# Patient Record
Sex: FEMALE | Race: WHITE | NOT HISPANIC OR LATINO | Employment: OTHER | URBAN - METROPOLITAN AREA
[De-identification: names, ages, dates, MRNs, and addresses within clinical notes are randomized per-mention and may not be internally consistent; named-entity substitution may affect disease eponyms.]

---

## 2017-07-10 ENCOUNTER — HISTORICAL (OUTPATIENT)
Dept: ADMINISTRATIVE | Facility: HOSPITAL | Age: 77
End: 2017-07-10

## 2017-07-10 LAB
ALBUMIN SERPL-MCNC: 3.2 GM/DL (ref 3.4–5)
ALBUMIN/GLOB SERPL: 0.9 RATIO (ref 1.1–2)
ALP SERPL-CCNC: 98 UNIT/L (ref 38–126)
ALT SERPL-CCNC: 19 UNIT/L (ref 12–78)
AST SERPL-CCNC: 9 UNIT/L (ref 15–37)
BILIRUB SERPL-MCNC: 0.3 MG/DL (ref 0.2–1)
BILIRUBIN DIRECT+TOT PNL SERPL-MCNC: 0.1 MG/DL (ref 0–0.5)
BILIRUBIN DIRECT+TOT PNL SERPL-MCNC: 0.2 MG/DL (ref 0–0.8)
BUN SERPL-MCNC: 17 MG/DL (ref 7–18)
CALCIUM SERPL-MCNC: 9.2 MG/DL (ref 8.5–10.1)
CHLORIDE SERPL-SCNC: 104 MMOL/L (ref 98–107)
CHOLEST SERPL-MCNC: 180 MG/DL (ref 0–200)
CHOLEST/HDLC SERPL: 2.2 {RATIO} (ref 0–4)
CO2 SERPL-SCNC: 30 MMOL/L (ref 21–32)
CREAT SERPL-MCNC: 0.64 MG/DL (ref 0.55–1.02)
GLOBULIN SER-MCNC: 3.5 GM/DL (ref 2.4–3.5)
GLUCOSE SERPL-MCNC: 84 MG/DL (ref 74–106)
HDLC SERPL-MCNC: 81 MG/DL (ref 35–60)
LDLC SERPL CALC-MCNC: 74 MG/DL (ref 0–129)
POTASSIUM SERPL-SCNC: 4.8 MMOL/L (ref 3.5–5.1)
PROT SERPL-MCNC: 6.7 GM/DL (ref 6.4–8.2)
SODIUM SERPL-SCNC: 142 MMOL/L (ref 136–145)
TRIGL SERPL-MCNC: 126 MG/DL (ref 30–150)
VLDLC SERPL CALC-MCNC: 25 MG/DL

## 2017-07-20 ENCOUNTER — HISTORICAL (OUTPATIENT)
Dept: ADMINISTRATIVE | Facility: HOSPITAL | Age: 77
End: 2017-07-20

## 2017-09-13 ENCOUNTER — HISTORICAL (OUTPATIENT)
Dept: ADMINISTRATIVE | Facility: HOSPITAL | Age: 77
End: 2017-09-13

## 2018-05-24 ENCOUNTER — HISTORICAL (OUTPATIENT)
Dept: ADMINISTRATIVE | Facility: HOSPITAL | Age: 78
End: 2018-05-24

## 2018-05-24 LAB
ABS NEUT (OLG): 4.28 X10(3)/MCL (ref 2.1–9.2)
ALBUMIN SERPL-MCNC: 3.2 GM/DL (ref 3.4–5)
ALBUMIN/GLOB SERPL: 0.9 {RATIO}
ALP SERPL-CCNC: 92 UNIT/L (ref 38–126)
ALT SERPL-CCNC: 13 UNIT/L (ref 12–78)
AST SERPL-CCNC: 9 UNIT/L (ref 15–37)
BASOPHILS # BLD AUTO: 0 X10(3)/MCL (ref 0–0.2)
BASOPHILS NFR BLD AUTO: 1 %
BILIRUB SERPL-MCNC: 0.4 MG/DL (ref 0.2–1)
BILIRUBIN DIRECT+TOT PNL SERPL-MCNC: 0.1 MG/DL (ref 0–0.2)
BILIRUBIN DIRECT+TOT PNL SERPL-MCNC: 0.3 MG/DL (ref 0–0.8)
BUN SERPL-MCNC: 16 MG/DL (ref 7–18)
CALCIUM SERPL-MCNC: 9.2 MG/DL (ref 8.5–10.1)
CHLORIDE SERPL-SCNC: 106 MMOL/L (ref 98–107)
CHOLEST SERPL-MCNC: 185 MG/DL (ref 0–200)
CHOLEST/HDLC SERPL: 2.2 {RATIO} (ref 0–4)
CO2 SERPL-SCNC: 29 MMOL/L (ref 21–32)
CREAT SERPL-MCNC: 0.57 MG/DL (ref 0.55–1.02)
EOSINOPHIL # BLD AUTO: 0.3 X10(3)/MCL (ref 0–0.9)
EOSINOPHIL NFR BLD AUTO: 4 %
ERYTHROCYTE [DISTWIDTH] IN BLOOD BY AUTOMATED COUNT: 13.3 % (ref 11.5–17)
GLOBULIN SER-MCNC: 3.4 GM/DL (ref 2.4–3.5)
GLUCOSE SERPL-MCNC: 82 MG/DL (ref 74–106)
HCT VFR BLD AUTO: 41.7 % (ref 37–47)
HDLC SERPL-MCNC: 85 MG/DL (ref 35–60)
HGB BLD-MCNC: 13.5 GM/DL (ref 12–16)
LDLC SERPL CALC-MCNC: 78 MG/DL (ref 0–129)
LYMPHOCYTES # BLD AUTO: 2 X10(3)/MCL (ref 0.6–4.6)
LYMPHOCYTES NFR BLD AUTO: 27 %
MCH RBC QN AUTO: 31.8 PG (ref 27–31)
MCHC RBC AUTO-ENTMCNC: 32.4 GM/DL (ref 33–36)
MCV RBC AUTO: 98.3 FL (ref 80–94)
MONOCYTES # BLD AUTO: 0.9 X10(3)/MCL (ref 0.1–1.3)
MONOCYTES NFR BLD AUTO: 12 %
NEUTROPHILS # BLD AUTO: 4.28 X10(3)/MCL (ref 1.4–7.9)
NEUTROPHILS NFR BLD AUTO: 57 %
PLATELET # BLD AUTO: 201 X10(3)/MCL (ref 130–400)
PMV BLD AUTO: 9.9 FL (ref 9.4–12.4)
POTASSIUM SERPL-SCNC: 4.7 MMOL/L (ref 3.5–5.1)
PROT SERPL-MCNC: 6.6 GM/DL (ref 6.4–8.2)
RBC # BLD AUTO: 4.24 X10(6)/MCL (ref 4.2–5.4)
SODIUM SERPL-SCNC: 142 MMOL/L (ref 136–145)
TRIGL SERPL-MCNC: 111 MG/DL (ref 30–150)
TSH SERPL-ACNC: 1.41 MIU/L (ref 0.36–3.74)
VLDLC SERPL CALC-MCNC: 22 MG/DL
WBC # SPEC AUTO: 7.5 X10(3)/MCL (ref 4.5–11.5)

## 2018-12-13 ENCOUNTER — HISTORICAL (OUTPATIENT)
Dept: ADMINISTRATIVE | Facility: HOSPITAL | Age: 78
End: 2018-12-13

## 2018-12-13 LAB
ABS NEUT (OLG): 4.04 X10(3)/MCL (ref 2.1–9.2)
ALBUMIN SERPL-MCNC: 3.2 GM/DL (ref 3.4–5)
ALBUMIN/GLOB SERPL: 1 {RATIO}
ALP SERPL-CCNC: 88 UNIT/L (ref 38–126)
ALT SERPL-CCNC: 12 UNIT/L (ref 12–78)
AST SERPL-CCNC: 6 UNIT/L (ref 15–37)
BASOPHILS # BLD AUTO: 0 X10(3)/MCL (ref 0–0.2)
BASOPHILS NFR BLD AUTO: 0 %
BILIRUB SERPL-MCNC: 0.4 MG/DL (ref 0.2–1)
BILIRUBIN DIRECT+TOT PNL SERPL-MCNC: 0.1 MG/DL (ref 0–0.2)
BILIRUBIN DIRECT+TOT PNL SERPL-MCNC: 0.3 MG/DL (ref 0–0.8)
BUN SERPL-MCNC: 15 MG/DL (ref 7–18)
CALCIUM SERPL-MCNC: 9 MG/DL (ref 8.5–10.1)
CHLORIDE SERPL-SCNC: 106 MMOL/L (ref 98–107)
CHOLEST SERPL-MCNC: 181 MG/DL (ref 0–200)
CHOLEST/HDLC SERPL: 2.5 {RATIO} (ref 0–4)
CO2 SERPL-SCNC: 30 MMOL/L (ref 21–32)
CREAT SERPL-MCNC: 0.63 MG/DL (ref 0.55–1.02)
EOSINOPHIL # BLD AUTO: 0.3 X10(3)/MCL (ref 0–0.9)
EOSINOPHIL NFR BLD AUTO: 4 %
ERYTHROCYTE [DISTWIDTH] IN BLOOD BY AUTOMATED COUNT: 13.2 % (ref 11.5–17)
GLOBULIN SER-MCNC: 3.1 GM/DL (ref 2.4–3.5)
GLUCOSE SERPL-MCNC: 93 MG/DL (ref 74–106)
HCT VFR BLD AUTO: 43.1 % (ref 37–47)
HDLC SERPL-MCNC: 71 MG/DL (ref 35–60)
HGB BLD-MCNC: 13.7 GM/DL (ref 12–16)
LDLC SERPL CALC-MCNC: 81 MG/DL (ref 0–129)
LYMPHOCYTES # BLD AUTO: 1.9 X10(3)/MCL (ref 0.6–4.6)
LYMPHOCYTES NFR BLD AUTO: 27 %
MCH RBC QN AUTO: 30.9 PG (ref 27–31)
MCHC RBC AUTO-ENTMCNC: 31.8 GM/DL (ref 33–36)
MCV RBC AUTO: 97.3 FL (ref 80–94)
MONOCYTES # BLD AUTO: 0.8 X10(3)/MCL (ref 0.1–1.3)
MONOCYTES NFR BLD AUTO: 11 %
NEUTROPHILS # BLD AUTO: 4.04 X10(3)/MCL (ref 2.1–9.2)
NEUTROPHILS NFR BLD AUTO: 57 %
PLATELET # BLD AUTO: 187 X10(3)/MCL (ref 130–400)
PMV BLD AUTO: 10 FL (ref 9.4–12.4)
POTASSIUM SERPL-SCNC: 4.7 MMOL/L (ref 3.5–5.1)
PROT SERPL-MCNC: 6.3 GM/DL (ref 6.4–8.2)
RBC # BLD AUTO: 4.43 X10(6)/MCL (ref 4.2–5.4)
SODIUM SERPL-SCNC: 142 MMOL/L (ref 136–145)
TRIGL SERPL-MCNC: 143 MG/DL (ref 30–150)
VLDLC SERPL CALC-MCNC: 29 MG/DL
WBC # SPEC AUTO: 7.1 X10(3)/MCL (ref 4.5–11.5)

## 2019-03-21 ENCOUNTER — HISTORICAL (OUTPATIENT)
Dept: ADMINISTRATIVE | Facility: HOSPITAL | Age: 79
End: 2019-03-21

## 2020-01-15 ENCOUNTER — HISTORICAL (OUTPATIENT)
Dept: ADMINISTRATIVE | Facility: HOSPITAL | Age: 80
End: 2020-01-15

## 2020-01-15 LAB
ABS NEUT (OLG): 4.31 X10(3)/MCL (ref 2.1–9.2)
ALBUMIN SERPL-MCNC: 3.1 GM/DL (ref 3.4–5)
ALBUMIN/GLOB SERPL: 0.9 RATIO (ref 1.1–2)
ALP SERPL-CCNC: 98 UNIT/L (ref 38–126)
ALT SERPL-CCNC: 12 UNIT/L (ref 12–78)
AST SERPL-CCNC: 9 UNIT/L (ref 15–37)
BASOPHILS # BLD AUTO: 0 X10(3)/MCL (ref 0–0.2)
BASOPHILS NFR BLD AUTO: 1 %
BILIRUB SERPL-MCNC: 0.3 MG/DL (ref 0.2–1)
BILIRUBIN DIRECT+TOT PNL SERPL-MCNC: 0.1 MG/DL (ref 0–0.5)
BILIRUBIN DIRECT+TOT PNL SERPL-MCNC: 0.2 MG/DL (ref 0–0.8)
BUN SERPL-MCNC: 17 MG/DL (ref 7–18)
CALCIUM SERPL-MCNC: 8.9 MG/DL (ref 8.5–10.1)
CHLORIDE SERPL-SCNC: 108 MMOL/L (ref 98–107)
CHOLEST SERPL-MCNC: 189 MG/DL (ref 0–200)
CHOLEST/HDLC SERPL: 2.7 {RATIO} (ref 0–4)
CO2 SERPL-SCNC: 29 MMOL/L (ref 21–32)
CREAT SERPL-MCNC: 0.68 MG/DL (ref 0.55–1.02)
EOSINOPHIL # BLD AUTO: 0.3 X10(3)/MCL (ref 0–0.9)
EOSINOPHIL NFR BLD AUTO: 4 %
ERYTHROCYTE [DISTWIDTH] IN BLOOD BY AUTOMATED COUNT: 14 % (ref 11.5–17)
GLOBULIN SER-MCNC: 3.3 GM/DL (ref 2.4–3.5)
GLUCOSE SERPL-MCNC: 85 MG/DL (ref 74–106)
HCT VFR BLD AUTO: 43.3 % (ref 37–47)
HDLC SERPL-MCNC: 69 MG/DL (ref 35–60)
HGB BLD-MCNC: 13.4 GM/DL (ref 12–16)
IMM GRANULOCYTES # BLD AUTO: 0 10*3/UL
IMM GRANULOCYTES NFR BLD AUTO: 0 %
LDLC SERPL CALC-MCNC: 95 MG/DL (ref 0–129)
LYMPHOCYTES # BLD AUTO: 2 X10(3)/MCL (ref 0.6–4.6)
LYMPHOCYTES NFR BLD AUTO: 26 %
MCH RBC QN AUTO: 31.2 PG (ref 27–31)
MCHC RBC AUTO-ENTMCNC: 30.9 GM/DL (ref 33–36)
MCV RBC AUTO: 100.7 FL (ref 80–94)
MONOCYTES # BLD AUTO: 0.9 X10(3)/MCL (ref 0.1–1.3)
MONOCYTES NFR BLD AUTO: 12 %
NEUTROPHILS # BLD AUTO: 4.31 X10(3)/MCL (ref 2.1–9.2)
NEUTROPHILS NFR BLD AUTO: 57 %
PLATELET # BLD AUTO: 199 X10(3)/MCL (ref 130–400)
PMV BLD AUTO: 10.3 FL (ref 9.4–12.4)
POTASSIUM SERPL-SCNC: 4.3 MMOL/L (ref 3.5–5.1)
PROT SERPL-MCNC: 6.4 GM/DL (ref 6.4–8.2)
RBC # BLD AUTO: 4.3 X10(6)/MCL (ref 4.2–5.4)
SODIUM SERPL-SCNC: 142 MMOL/L (ref 136–145)
TRIGL SERPL-MCNC: 126 MG/DL (ref 30–150)
TSH SERPL-ACNC: 1.95 MIU/L (ref 0.36–3.74)
VLDLC SERPL CALC-MCNC: 25 MG/DL
WBC # SPEC AUTO: 7.5 X10(3)/MCL (ref 4.5–11.5)

## 2020-04-23 ENCOUNTER — HISTORICAL (OUTPATIENT)
Dept: ADMINISTRATIVE | Facility: HOSPITAL | Age: 80
End: 2020-04-23

## 2020-04-23 LAB
ALBUMIN SERPL-MCNC: 3.4 GM/DL (ref 3.4–4.8)
ALBUMIN/GLOB SERPL: 1.1 RATIO (ref 1.1–2)
ALP SERPL-CCNC: 96 UNIT/L (ref 40–150)
ALT SERPL-CCNC: 7 UNIT/L (ref 0–55)
AST SERPL-CCNC: 10 UNIT/L (ref 5–34)
BILIRUB SERPL-MCNC: 0.4 MG/DL
BILIRUBIN DIRECT+TOT PNL SERPL-MCNC: 0.2 MG/DL (ref 0–0.5)
BILIRUBIN DIRECT+TOT PNL SERPL-MCNC: 0.2 MG/DL (ref 0–0.8)
BUN SERPL-MCNC: 15 MG/DL (ref 9.8–20.1)
CALCIUM SERPL-MCNC: 9 MG/DL (ref 8.4–10.2)
CHLORIDE SERPL-SCNC: 106 MMOL/L (ref 98–107)
CHOLEST SERPL-MCNC: 180 MG/DL
CHOLEST/HDLC SERPL: 3 {RATIO} (ref 0–5)
CO2 SERPL-SCNC: 30 MMOL/L (ref 23–31)
CREAT SERPL-MCNC: 0.71 MG/DL (ref 0.55–1.02)
GLOBULIN SER-MCNC: 3 GM/DL (ref 2.4–3.5)
GLUCOSE SERPL-MCNC: 94 MG/DL (ref 82–115)
HDLC SERPL-MCNC: 58 MG/DL (ref 35–60)
LDLC SERPL CALC-MCNC: 95 MG/DL (ref 50–140)
POTASSIUM SERPL-SCNC: 4.5 MMOL/L (ref 3.5–5.1)
PROT SERPL-MCNC: 6.4 GM/DL (ref 5.8–7.6)
SODIUM SERPL-SCNC: 142 MMOL/L (ref 136–145)
TRIGL SERPL-MCNC: 136 MG/DL (ref 37–140)
VLDLC SERPL CALC-MCNC: 27 MG/DL

## 2021-01-19 ENCOUNTER — HISTORICAL (OUTPATIENT)
Dept: ADMINISTRATIVE | Facility: HOSPITAL | Age: 81
End: 2021-01-19

## 2021-01-19 LAB
ABS NEUT (OLG): 5.56 X10(3)/MCL (ref 2.1–9.2)
ALBUMIN SERPL-MCNC: 3.3 GM/DL (ref 3.4–4.8)
ALBUMIN/GLOB SERPL: 1 RATIO (ref 1.1–2)
ALP SERPL-CCNC: 97 UNIT/L (ref 40–150)
ALT SERPL-CCNC: 8 UNIT/L (ref 0–55)
AST SERPL-CCNC: 10 UNIT/L (ref 5–34)
BASOPHILS # BLD AUTO: 0 X10(3)/MCL (ref 0–0.2)
BASOPHILS NFR BLD AUTO: 1 %
BILIRUB SERPL-MCNC: 0.4 MG/DL
BILIRUBIN DIRECT+TOT PNL SERPL-MCNC: 0.2 MG/DL (ref 0–0.5)
BILIRUBIN DIRECT+TOT PNL SERPL-MCNC: 0.2 MG/DL (ref 0–0.8)
BUN SERPL-MCNC: 15.6 MG/DL (ref 9.8–20.1)
CALCIUM SERPL-MCNC: 9.2 MG/DL (ref 8.4–10.2)
CHLORIDE SERPL-SCNC: 103 MMOL/L (ref 98–107)
CHOLEST SERPL-MCNC: 198 MG/DL
CHOLEST/HDLC SERPL: 3 {RATIO} (ref 0–5)
CO2 SERPL-SCNC: 29 MMOL/L (ref 23–31)
CREAT SERPL-MCNC: 0.72 MG/DL (ref 0.55–1.02)
EOSINOPHIL # BLD AUTO: 0.2 X10(3)/MCL (ref 0–0.9)
EOSINOPHIL NFR BLD AUTO: 2 %
ERYTHROCYTE [DISTWIDTH] IN BLOOD BY AUTOMATED COUNT: 13.2 % (ref 11.5–17)
GLOBULIN SER-MCNC: 3.3 GM/DL (ref 2.4–3.5)
GLUCOSE SERPL-MCNC: 79 MG/DL (ref 82–115)
HCT VFR BLD AUTO: 42.9 % (ref 37–47)
HDLC SERPL-MCNC: 76 MG/DL (ref 35–60)
HGB BLD-MCNC: 13.8 GM/DL (ref 12–16)
LDLC SERPL CALC-MCNC: 96 MG/DL (ref 50–140)
LYMPHOCYTES # BLD AUTO: 2.2 X10(3)/MCL (ref 0.6–4.6)
LYMPHOCYTES NFR BLD AUTO: 25 %
MCH RBC QN AUTO: 31.7 PG (ref 27–31)
MCHC RBC AUTO-ENTMCNC: 32.2 GM/DL (ref 33–36)
MCV RBC AUTO: 98.4 FL (ref 80–94)
MONOCYTES # BLD AUTO: 0.9 X10(3)/MCL (ref 0.1–1.3)
MONOCYTES NFR BLD AUTO: 10 %
NEUTROPHILS # BLD AUTO: 5.56 X10(3)/MCL (ref 2.1–9.2)
NEUTROPHILS NFR BLD AUTO: 62 %
PLATELET # BLD AUTO: 213 X10(3)/MCL (ref 130–400)
PMV BLD AUTO: 10.2 FL (ref 9.4–12.4)
POTASSIUM SERPL-SCNC: 4.9 MMOL/L (ref 3.5–5.1)
PROT SERPL-MCNC: 6.6 GM/DL (ref 5.8–7.6)
RBC # BLD AUTO: 4.36 X10(6)/MCL (ref 4.2–5.4)
SODIUM SERPL-SCNC: 139 MMOL/L (ref 136–145)
TRIGL SERPL-MCNC: 130 MG/DL (ref 37–140)
TSH SERPL-ACNC: 2.14 UIU/ML (ref 0.35–4.94)
VLDLC SERPL CALC-MCNC: 26 MG/DL
WBC # SPEC AUTO: 8.9 X10(3)/MCL (ref 4.5–11.5)

## 2021-05-11 ENCOUNTER — HISTORICAL (OUTPATIENT)
Dept: ADMINISTRATIVE | Facility: HOSPITAL | Age: 81
End: 2021-05-11

## 2021-05-11 LAB
ALBUMIN SERPL-MCNC: 3.4 GM/DL (ref 3.4–4.8)
ALBUMIN/GLOB SERPL: 1 RATIO (ref 1.1–2)
ALP SERPL-CCNC: 110 UNIT/L (ref 40–150)
ALT SERPL-CCNC: 10 UNIT/L (ref 0–55)
AST SERPL-CCNC: 12 UNIT/L (ref 5–34)
BILIRUB SERPL-MCNC: 0.3 MG/DL
BILIRUBIN DIRECT+TOT PNL SERPL-MCNC: 0.1 MG/DL (ref 0–0.5)
BILIRUBIN DIRECT+TOT PNL SERPL-MCNC: 0.2 MG/DL (ref 0–0.8)
BUN SERPL-MCNC: 15.6 MG/DL (ref 9.8–20.1)
CALCIUM SERPL-MCNC: 9.5 MG/DL (ref 8.4–10.2)
CHLORIDE SERPL-SCNC: 104 MMOL/L (ref 98–107)
CHOLEST SERPL-MCNC: 205 MG/DL
CHOLEST/HDLC SERPL: 3 {RATIO} (ref 0–5)
CO2 SERPL-SCNC: 31 MMOL/L (ref 23–31)
CREAT SERPL-MCNC: 0.68 MG/DL (ref 0.55–1.02)
GLOBULIN SER-MCNC: 3.4 GM/DL (ref 2.4–3.5)
GLUCOSE SERPL-MCNC: 77 MG/DL (ref 82–115)
HDLC SERPL-MCNC: 70 MG/DL (ref 35–60)
LDLC SERPL CALC-MCNC: 107 MG/DL (ref 50–140)
POTASSIUM SERPL-SCNC: 4.8 MMOL/L (ref 3.5–5.1)
PROT SERPL-MCNC: 6.8 GM/DL (ref 5.8–7.6)
SODIUM SERPL-SCNC: 143 MMOL/L (ref 136–145)
TRIGL SERPL-MCNC: 141 MG/DL (ref 37–140)
VLDLC SERPL CALC-MCNC: 28 MG/DL

## 2021-10-12 ENCOUNTER — HISTORICAL (OUTPATIENT)
Dept: ADMINISTRATIVE | Facility: HOSPITAL | Age: 81
End: 2021-10-12

## 2021-10-14 ENCOUNTER — HISTORICAL (OUTPATIENT)
Dept: ADMINISTRATIVE | Facility: HOSPITAL | Age: 81
End: 2021-10-14

## 2021-11-09 ENCOUNTER — HISTORICAL (OUTPATIENT)
Dept: ADMINISTRATIVE | Facility: HOSPITAL | Age: 81
End: 2021-11-09

## 2021-12-07 ENCOUNTER — HISTORICAL (OUTPATIENT)
Dept: ADMINISTRATIVE | Facility: HOSPITAL | Age: 81
End: 2021-12-07

## 2022-01-25 ENCOUNTER — HISTORICAL (OUTPATIENT)
Dept: ADMINISTRATIVE | Facility: HOSPITAL | Age: 82
End: 2022-01-25

## 2022-04-12 ENCOUNTER — HISTORICAL (OUTPATIENT)
Dept: ADMINISTRATIVE | Facility: HOSPITAL | Age: 82
End: 2022-04-12
Payer: MEDICARE

## 2022-04-30 VITALS
SYSTOLIC BLOOD PRESSURE: 138 MMHG | OXYGEN SATURATION: 97 % | BODY MASS INDEX: 28.54 KG/M2 | DIASTOLIC BLOOD PRESSURE: 68 MMHG | HEIGHT: 65 IN | WEIGHT: 171.31 LBS

## 2022-05-05 ENCOUNTER — LAB VISIT (OUTPATIENT)
Dept: LAB | Facility: HOSPITAL | Age: 82
End: 2022-05-05
Attending: INTERNAL MEDICINE
Payer: MEDICARE

## 2022-05-05 DIAGNOSIS — I48.92 ATRIAL FLUTTER: Primary | ICD-10-CM

## 2022-05-05 DIAGNOSIS — Z51.81 ENCOUNTER FOR THERAPEUTIC DRUG MONITORING: ICD-10-CM

## 2022-05-05 DIAGNOSIS — E78.5 HYPERLIPIDEMIA, UNSPECIFIED HYPERLIPIDEMIA TYPE: ICD-10-CM

## 2022-05-05 LAB
ALBUMIN SERPL-MCNC: 3.5 GM/DL (ref 3.4–4.8)
ALBUMIN/GLOB SERPL: 1.2 RATIO (ref 1.1–2)
ALP SERPL-CCNC: 123 UNIT/L (ref 40–150)
ALT SERPL-CCNC: 13 UNIT/L (ref 0–55)
AST SERPL-CCNC: 13 UNIT/L (ref 5–34)
BILIRUBIN DIRECT+TOT PNL SERPL-MCNC: 0.2 MG/DL (ref 0–0.5)
BILIRUBIN DIRECT+TOT PNL SERPL-MCNC: 0.3 MG/DL (ref 0–0.8)
BILIRUBIN DIRECT+TOT PNL SERPL-MCNC: 0.5 MG/DL
BUN SERPL-MCNC: 20.2 MG/DL (ref 9.8–20.1)
CALCIUM SERPL-MCNC: 9.5 MG/DL (ref 8.4–10.2)
CHLORIDE SERPL-SCNC: 107 MMOL/L (ref 98–107)
CHOLEST SERPL-MCNC: 135 MG/DL
CHOLEST/HDLC SERPL: 3 {RATIO} (ref 0–5)
CO2 SERPL-SCNC: 29 MMOL/L (ref 23–31)
CREAT SERPL-MCNC: 0.73 MG/DL (ref 0.55–1.02)
GLOBULIN SER-MCNC: 2.9 GM/DL (ref 2.4–3.5)
GLUCOSE SERPL-MCNC: 87 MG/DL (ref 82–115)
HDLC SERPL-MCNC: 51 MG/DL (ref 35–60)
LDLC SERPL CALC-MCNC: 66 MG/DL (ref 50–140)
POTASSIUM SERPL-SCNC: 4.7 MMOL/L (ref 3.5–5.1)
PROT SERPL-MCNC: 6.4 GM/DL (ref 5.8–7.6)
SODIUM SERPL-SCNC: 142 MMOL/L (ref 136–145)
TRIGL SERPL-MCNC: 92 MG/DL (ref 37–140)
VLDLC SERPL CALC-MCNC: 18 MG/DL

## 2022-05-05 PROCEDURE — 80061 LIPID PANEL: CPT

## 2022-05-05 PROCEDURE — 30000890 FLECAINIDE LEVEL

## 2022-05-05 PROCEDURE — 80181 DRUG ASSAY FLECAINIDE: CPT

## 2022-05-05 PROCEDURE — 80053 COMPREHEN METABOLIC PANEL: CPT

## 2022-05-05 PROCEDURE — 36415 COLL VENOUS BLD VENIPUNCTURE: CPT

## 2022-05-05 NOTE — HISTORICAL [facility]
This is a historical note converted from [employer]. Formatting and pictures may have been removed.  Please reference [employer] for original formatting and attached multimedia. Chief Complaint  Right hand redness, swelling, pain x 3 days. denies any injury or trauma.  History of Present Illness  81-year-old female who presents for evaluation of right hand pain and swelling that is been present for approximately 3 days. ?The patient states that she frequently hits her hand on objects?when moving around but denies any known specific trauma. ?She denies any falls.  Review of Systems  Constitutional_no fever, fatigue, weakness  Musculoskeletal_negative except HPI  Integumentary_no skin rash or abnormal lesion  Neurologic_no headache, no dizziness, no weakness or numbness  ?  Physical Exam  Vitals & Measurements  T:?36.7? ?C (Oral)? HR:?60(Peripheral)? RR:?20? BP:?130/72? SpO2:?97%?  HT:?165.00?cm? WT:?77.700?kg? BMI:?28.54?  General_well-developed well-nourished in no acute distress  Respiratory_symmetric chest rise, non-labored respirations  Musculoskeletal_tenderness at?the proximal?phalanx of the second and third fingers, tenderness and mild swelling at the?second and third metacarpals, mild tenderness at the?proximal phalanx of the thumb  Integumentary_no rashes or skin lesions present  Neurologic_ no signs of peripheral neurological deficit, motor/sensory function intact?  ?  Assessment/Plan  1.?Pain in right hand?M79.641  X-ray results from radiology reading will be monitored, results will be reported when available.  Start methocarbamol as prescribed  May alternate with Tylenol?and recommend ice and elevation  Monitor for worsening symptoms and contact medical any concerns arise.  No acute fracture at the metacarpal?or proximal phalanges?where patient is describing pain however?this is my read and radiologist read is pending.? As above patient will be notified of official results.  Ordered:  Office/Outpatient Visit  Level 3 Established [id number] PC, Pain in right hand  Swelling of right hand, 10/12/21 9:10:00 CDT  XR Hand Right Minimum 3 Views, Routine, 10/12/21 8:49:00 CDT, None, Patient Bed, Patient Has IV?, Patient on Oxygen?, Rad Type, Pain in right hand  Swelling of right hand, Not Scheduled, 10/12/21 8:49:00 CDT  ?  2.?Swelling of right hand?M79.89  ?See #1  Ordered:  Office/Outpatient Visit Level 3 Established [id number] PC, Pain in right hand  Swelling of right hand, 10/12/21 9:10:00 CDT  XR Hand Right Minimum 3 Views, Routine, 10/12/21 8:49:00 CDT, None, Patient Bed, Patient Has IV?, Patient on Oxygen?, Rad Type, Pain in right hand  Swelling of right hand, Not Scheduled, 10/12/21 8:49:00 CDT  ?  Orders:  methocarbamol, 500 mg = 1 tab(s), Oral, TID, PRN PRN as needed for pain, X 5 day(s), # 15 tab(s), 0 Refill(s), [email or web address], 165, cm, Height/Length Dosing, 10/12/21 8:24:00 CDT, 77.7, kg, Weight Dosing, 10/12/21 8:24:00 CDT   Problem List/Past Medical History  Ongoing  Afib(  Confirmed  )  HBP (High Blood Pressure)(  Confirmed  )  Historical  No qualifying data  Procedure/Surgical History  Repair Right Hand Skin, External Approach (11/09/2019)  Simple repair of superficial wounds of scalp, neck, axillae, external genitalia, trunk and/or extremities (including hands and feet); 2.5 cm or less (11/09/2019)  Fluoroscopy of Left Heart using Low Osmolar Contrast (03/10/2018)  Fluoroscopy of Multiple Coronary Arteries using Low Osmolar Contrast (03/10/2018)  Measurement of Cardiac Sampling and Pressure, Left Heart, Percutaneous Approach (03/10/2018)  Ablation operation for arrhythmia  Aneurysmectomy  Hysterectomy   Medications  aspirin 81 mg oral tablet, CHEWABLE, 81 mg= 1 tab(s), Oral, Daily  atorvastatin 10 mg oral tablet, 10 mg= 1 tab(s), Oral, Daily  Bentyl 10 mg oral capsule, 10 mg= 1 cap(s), Oral, QID, PRN  Colon Health  conjugated estrogens 0.45 mg oral tablet  flecainide 50 mg oral tablet, 50 mg= 1  tab(s), Oral, q12hr  losartan 50 mg oral tablet, 50 mg= 1 tab(s), Oral, BID  methocarbamol 500 mg oral tablet, 500 mg= 1 tab(s), Oral, TID, PRN  metoprolol succinate 100 mg oral tablet extended release, Oral, Daily  Probiotic Formula oral capsule  Vitamin D3 1000 intl units oral tablet, Oral, Daily  Xarelto 20mg Tablet, 20 mg= 1 tab(s), Oral, Daily  Zofran ODT 4 mg oral tablet, disintegrating, 4 mg= 1 tab(s), Oral, q6hr, PRN  Allergies  No Known Allergies  No Known Medication Allergies  Social History  Abuse/Neglect  No, 10/12/2021  No, 01/26/2021  Alcohol  Never, 08/19/2015  Employment/School  Retired, 08/19/2015  Exercise  Exercise duration: 0., 03/17/2016  Home/Environment  Lives with Spouse., 08/19/2015  Nutrition/Health  Regular, 08/19/2015  Sexual  Sexually active: Yes., 05/25/2017  Substance Use  Never, 08/19/2015  Tobacco  Never (less than 100 in lifetime), N/A, 10/12/2021  Never (less than 100 in lifetime), N/A, 01/26/2021  Family History  Heart disease: Father.  Immunizations  Vaccine Date Status   COVID-19 MRNA, LNP-S, PF- Pfizer 02/10/2021 Given   COVID-19 MRNA, LNP-S, PF- Pfizer 01/20/2021 Given   COVID-19 MRNA, LNP-S, PF- Pfizer 01/20/2021 Recorded   influenza virus vaccine, inactivated 11/14/2020 Recorded   pneumococcal 23-valent vaccine 12/31/2019 Recorded   tetanus/diphtheria/pertussis, acel(Tdap) 11/09/2019 Given   influenza virus vaccine, inactivated 10/23/2019 Recorded   influenza virus vaccine, inactivated 10/18/2018 Recorded   influenza virus vaccine, inactivated 10/26/2015 Recorded   influenza virus vaccine, inactivated 11/18/2014 Recorded   influenza virus vaccine, inactivated 10/31/2008 Recorded   influenza virus vaccine, inactivated 10/31/2007 Recorded   influenza virus vaccine, inactivated 12/12/2006 Recorded   influenza virus vaccine, inactivated 11/18/2005 Recorded       Fracture was seen at the proximal phalanx of the right thumb (palm appears to be an error). Patient prescribed splint  despite no pain or loss of ROM at thumb. Referral to Ortho will be sent.  ?   Accession:?[id number]  Order:?XR Hand Right Minimum 3 Views  Report Dt/Tm:?10/12/2021 13:00  Report:?  XR Hand Right Minimum 3 Views  ?  HISTORY: Pain  ?  COMPARISON: None.  ?  FINDINGS:  ?  There is a minimally displaced fracture involving the head of the  proximal phalanx of the palm  There are some degenerative changes of the interphalangeal joints with  degenerative changes of the first carpometacarpal joint  No blastic or lytic lesions.  Soft tissues within normal limits.  ?  IMPRESSION:  ?  Fracture as above.  ?  Degenerative changes  ?

## 2022-05-05 NOTE — HISTORICAL [facility]
This is a historical note converted from [employer]. Formatting and pictures may have been removed.  Please reference Mikey for original formatting and attached multimedia. Chief Complaint  Follow up right thumb fracture, Patient presents today saying her thumb is still in pain  History of Present Illness  81-year-old female presents here for follow-up of right thumb injury. ?Patient states her thumb is not bothering her. ?She is having?some mild pain in the right hand tracks proximal to her wrist.??Worse with prolonged activity. ?Improved slightly by rest.  Review of Systems  Denies fevers, chills, chest pain, shortness of breath. Comprehensive review of systems performed and otherwise negative except as noted in HPI.  Physical Exam  Vitals & Measurements  T:?97.9? ?F (Oral)?  HT:?165.00?cm? WT:?77.700?kg? BMI:?28.54?  General: No acute distress, alert and oriented, healthy appearing?  HEENT: Head is atraumatic, mucous membranes are moist?  Cardiovascular: Brisk capillary refill  Lungs: Breathing non-labored?  Skin: no rashes appreciated?  Neurologic: Sensation is grossly intact distally  ?  Right wrist:  Sensation tact distally. ?No tenderness in her thumb. ?Does have a positive Finkelsteins. ?Tenderness over?the first dorsal compartment.  Assessment/Plan  1.?De Quervains tenosynovitis, right?M65.4  ?Patient with right de Quervains tenosynovitis. ?Discussed all treatment options today.? She does not wish to have an injection. ?We will call her in some Voltaren gel. ?She will contact us which is tried injection. ?Otherwise she can go back to full activities with no restriction.  Orders:  XR Hand Thumb Right Min 2 Views, Routine, 12/07/21 15:09:00 CST, None, Patient Bed, Patient Has IV?, Patient on Oxygen?, Rad Type, Thumb pain, Not Scheduled, 12/07/21 15:09:00 CST   Problem List/Past Medical History  Ongoing  Afib(  Confirmed  )  HBP (High Blood Pressure)(  Confirmed  )  Historical  No qualifying  data  Procedure/Surgical History  Repair Right Hand Skin, External Approach (11/09/2019)  Simple repair of superficial wounds of scalp, neck, axillae, external genitalia, trunk and/or extremities (including hands and feet); 2.5 cm or less (11/09/2019)  Fluoroscopy of Left Heart using Low Osmolar Contrast (03/10/2018)  Fluoroscopy of Multiple Coronary Arteries using Low Osmolar Contrast (03/10/2018)  Measurement of Cardiac Sampling and Pressure, Left Heart, Percutaneous Approach (03/10/2018)  Ablation operation for arrhythmia  Aneurysmectomy  Hysterectomy   Medications  aspirin 81 mg oral tablet, CHEWABLE, 81 mg= 1 tab(s), Oral, Daily,? ?Not taking  atorvastatin 10 mg oral tablet, 10 mg= 1 tab(s), Oral, Daily  Bentyl 10 mg oral capsule, 10 mg= 1 cap(s), Oral, QID, PRN,? ?Not taking  Colon Health  conjugated estrogens 0.45 mg oral tablet,? ?Not taking  doxazosin 2 mg oral tablet, 2 mg= 1 tab(s), Oral, Daily  doxazosin 4 mg oral tablet, 4 mg= 1 tab(s), Oral, Daily  Finger Splint (thumb), See Instructions  flecainide 50 mg oral tablet, 50 mg= 1 tab(s), Oral, q12hr  losartan 50 mg oral tablet, 50 mg= 1 tab(s), Oral, BID  methocarbamol 500 mg oral tablet, 500 mg= 1 tab(s), Oral, TID  metoprolol succinate 100 mg oral tablet extended release, Oral, Daily  Probiotic Formula oral capsule,? ?Not taking  Vitamin D3 1000 intl units oral tablet, Oral, Daily  Voltaren 1% topical gel, 2 gm, TOP, QID, 1 refills  Xarelto 20mg Tablet, 20 mg= 1 tab(s), Oral, Daily  Zofran ODT 4 mg oral tablet, disintegrating, 4 mg= 1 tab(s), Oral, q6hr, PRN  Allergies  No Known Allergies  No Known Medication Allergies  Social History  Abuse/Neglect  No, No, Yes, 12/07/2021  Alcohol  Never, 10/14/2021  Employment/School  Retired, 08/19/2015  Exercise  Exercise duration: 0., 03/17/2016  Home/Environment  Lives with Spouse., 08/19/2015  Nutrition/Health  Regular, 08/19/2015  Sexual  Sexually active: Yes., 05/25/2017  Substance Use  Never,  08/19/2015  Tobacco  Never (less than 100 in lifetime), No, 12/07/2021  Family History  Heart disease: Father.  Immunizations  Vaccine Date Status   influenza virus vaccine, inactivated 11/09/2021 Given   COVID-19 MRNA, LNP-S, PF- Pfizer 10/15/2021 Given   COVID-19 MRNA, LNP-S, PF- Pfizer 02/10/2021 Given   COVID-19 MRNA, LNP-S, PF- Pfizer 01/20/2021 Given   COVID-19 MRNA, LNP-S, PF- Pfizer 01/20/2021 Recorded   influenza virus vaccine, inactivated 11/14/2020 Recorded   pneumococcal 23-valent vaccine 12/31/2019 Recorded   tetanus/diphtheria/pertussis, acel(Tdap) 11/09/2019 Given   influenza virus vaccine, inactivated 10/23/2019 Recorded   influenza virus vaccine, inactivated 10/18/2018 Recorded   influenza virus vaccine, inactivated 10/26/2015 Recorded   influenza virus vaccine, inactivated 11/18/2014 Recorded   influenza virus vaccine, inactivated 10/31/2008 Recorded   influenza virus vaccine, inactivated 10/31/2007 Recorded   influenza virus vaccine, inactivated 12/12/2006 Recorded   influenza virus vaccine, inactivated 11/18/2005 Recorded   Health Maintenance  Health Maintenance  ???Pending?(in the next year)  ??? ??OverDue  ??? ? ? ?Bone Density Screening due??05/25/19??Variable frequency  ??? ? ? ?Hypertension Management-Education due??05/29/19??and every 1??year(s)  ??? ? ? ?Cognitive Screening due??01/02/21??and every 1??year(s)  ??? ? ? ?ADL Screening due??08/26/21??and every 1??year(s)  ??? ??Due?  ??? ? ? ?Zoster Vaccine due??12/07/21??Unknown Frequency  ??? ??Due In Future?  ??? ? ? ?Obesity Screening not due until??01/01/22??and every 1??year(s)  ??? ? ? ?Advance Directive not due until??01/02/22??and every 1??year(s)  ??? ? ? ?Fall Risk Assessment not due until??01/02/22??and every 1??year(s)  ??? ? ? ?Functional Assessment not due until??01/02/22??and every 1??year(s)  ??? ? ? ?Medicare Annual Wellness Exam not due until??01/26/22??and every 1??year(s)  ??? ? ? ?Hypertension Management-BMP not due  until??05/11/22??and every 1??year(s)  ??? ? ? ?Depression Screening not due until??10/14/22??and every 1??year(s)  ??? ? ? ?Blood Pressure Screening not due until??11/09/22??and every 1??year(s)  ??? ? ? ?Hypertension Management-Blood Pressure not due until??11/09/22??and every 1??year(s)  ???Satisfied?(in the past 1 year)  ??? ??Satisfied?  ??? ? ? ?Advance Directive on??10/12/21.??Satisfied by Bettie Gordillo  ??? ? ? ?Blood Pressure Screening on??11/09/21.??Satisfied by Edilia Barnett  ??? ? ? ?Body Mass Index Check on??12/07/21.??Satisfied by Krys Perla  ??? ? ? ?Coronary Artery Disease Maintenance-Lipid Lowering Therapy on??10/12/21.  ??? ? ? ?Depression Screening on??10/14/21.??Satisfied by Lexi Cuello  ??? ? ? ?Diabetes Screening on??05/11/21.??Satisfied by Andrew Crain  ??? ? ? ?Fall Risk Assessment on??11/09/21.??Satisfied by Edilia Barnett  ??? ? ? ?Functional Assessment on??01/26/21.??Satisfied by Naz Hale LPN  ??? ? ? ?Hypertension Management-Blood Pressure on??11/09/21.??Satisfied by Edilia Barnett  ??? ? ? ?Influenza Vaccine on??11/09/21.??Satisfied by Gali Bailey  ??? ? ? ?Lipid Screening on??05/11/21.??Satisfied by Andrew Crain  ??? ? ? ?Medicare Annual Wellness Exam on??01/26/21.??Satisfied by Benedict Jackson II, MD  ??? ? ? ?Obesity Screening on??12/07/21.??Satisfied by Krys Perla  ?  Diagnostic Results  AP lateral right hand reviewed. ?Fracture well aligned.? 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

## 2022-05-05 NOTE — HISTORICAL [facility]
This is a historical note converted from [employer]. Formatting and pictures may have been removed.  Please reference Mikey for original formatting and attached multimedia. Chief Complaint  New Patient referred by Dr. Ahn for fracture of right thumb. Patient reports having pain in right hand. States not having pain in thumb despite fracture. Right hand is swollen. States still able to move fingers and make a fist.  History of Present Illness  81-year-old female presents here to the clinic today for evaluation of right thumb fracture. ?She states about 3 days ago?she began to have pain to the right hand with significant swelling.? She was seen in the urgent care clinic who diagnosed her with a right thumb fracture.? She was prescribed Robaxin, which she states is really not helping with her pain. ?She was also given a thumb splint?with Coban.? She states that the splint has helped somewhat?but began to irritate her skin.? In regards to the swelling she has been icing this.? She still is significantly swollen to the dorsal surface of her hand.? She also complains of pain?to the?pointer finger?radiates down to the wrist.  Review of Systems  Denies fevers, chills, chest pain, shortness of breath. Comprehensive review of systems performed and otherwise negative except as noted in HPI  Physical Exam  Vitals & Measurements  T:?97.6? ?F (Oral)? HR:?65(Peripheral)? BP:?134/64?  HT:?165.00?cm? WT:?77.700?kg? BMI:?28.54?  General: awake and alert, no acute distress, healthy appearing  ?Head and Neck: Head atraumatic/normocephalic. Moist MM  ?CV: brisk cap refill  ?Lungs: non-labored breathing, w/o cough or SOB  ?Skin: no rashes present, warm to touch  ?Neuro: sensation grossly intact distally  ?  Right hand:  Skin warm, dry, intact.? No ecchymosis or erythema noted.  Significant swelling noted to the dorsal surface of the hand especially at the thumb?base?and the base of the rest of the digits.  Tenderness to palpation along  the index finger to the wrist?at the dorsal surface  Range of motion?slightly decreased due to swelling?with discomfort at full flexion  Brisk capillary refill distally  Sensation intact distally  Assessment/Plan  1.?Fracture of phalanx of right thumb?S62.501A  ?Patient presents the clinic today 3?days status post injury to right hand. ?She states that she is unsure of what happened. ?She was seen at an urgent care and diagnosed with a?phalanx fracture of the right thumb.??Today here in clinic she does have significant swelling to the dorsal surface of the hand.? She also has tenderness along the pointer finger down to the wrist along the dorsal surface.? We will treat her today with a?thumb spica wrist brace.? She was educated to come out of this?every day?do active range of motion to?all digits, except the thumb,?to help with the swelling.? We will have her follow-up in 7 to 10 days with x-rays of her right thumb to assess the fracture site.  The above findings, diagnostics, and treatment plan were discussed with Dr. Rolf Zheng who is in agreement with the plan of care.?  Referrals  Clinic Follow up, *Est. 10/21/21 3:00:00 CDT, Order for future visit, Fracture of phalanx of right thumb, [facility]   Problem List/Past Medical History  Ongoing  Afib(  Confirmed  )  HBP (High Blood Pressure)(  Confirmed  )  Historical  No qualifying data  Procedure/Surgical History  Repair Right Hand Skin, External Approach (11/09/2019)  Simple repair of superficial wounds of scalp, neck, axillae, external genitalia, trunk and/or extremities (including hands and feet); 2.5 cm or less (11/09/2019)  Fluoroscopy of Left Heart using Low Osmolar Contrast (03/10/2018)  Fluoroscopy of Multiple Coronary Arteries using Low Osmolar Contrast (03/10/2018)  Measurement of Cardiac Sampling and Pressure, Left Heart, Percutaneous Approach (03/10/2018)  Ablation operation for arrhythmia  Aneurysmectomy  Hysterectomy   Medications  aspirin  81 mg oral tablet, CHEWABLE, 81 mg= 1 tab(s), Oral, Daily,? ?Not taking  atorvastatin 10 mg oral tablet, 10 mg= 1 tab(s), Oral, Daily  Bentyl 10 mg oral capsule, 10 mg= 1 cap(s), Oral, QID, PRN,? ?Not taking  Colon Health  conjugated estrogens 0.45 mg oral tablet,? ?Not taking  doxazosin 2 mg oral tablet, 2 mg= 1 tab(s), Oral, Daily  Finger Splint (thumb), See Instructions  flecainide 50 mg oral tablet, 50 mg= 1 tab(s), Oral, q12hr  losartan 50 mg oral tablet, 50 mg= 1 tab(s), Oral, BID  methocarbamol 500 mg oral tablet, 500 mg= 1 tab(s), Oral, TID, PRN  metoprolol succinate 100 mg oral tablet extended release, Oral, Daily  Probiotic Formula oral capsule,? ?Not taking  Vitamin D3 1000 intl units oral tablet, Oral, Daily  Xarelto 20mg Tablet, 20 mg= 1 tab(s), Oral, Daily  Zofran ODT 4 mg oral tablet, disintegrating, 4 mg= 1 tab(s), Oral, q6hr, PRN  Allergies  No Known Allergies  No Known Medication Allergies  Social History  Abuse/Neglect  No, No, Yes, 10/14/2021  Alcohol  Never, 10/14/2021  Employment/School  Retired, 08/19/2015  Exercise  Exercise duration: 0., 03/17/2016  Home/Environment  Lives with Spouse., 08/19/2015  Nutrition/Health  Regular, 08/19/2015  Sexual  Sexually active: Yes., 05/25/2017  Substance Use  Never, 08/19/2015  Tobacco  Never (less than 100 in lifetime), No, 10/14/2021  Family History  Heart disease: Father.  Immunizations  Vaccine Date Status   COVID-19 MRNA, LNP-S, PF- Pfizer 02/10/2021 Given   COVID-19 MRNA, LNP-S, PF- Pfizer 01/20/2021 Given   COVID-19 MRNA, LNP-S, PF- Pfizer 01/20/2021 Recorded   influenza virus vaccine, inactivated 11/14/2020 Recorded   pneumococcal 23-valent vaccine 12/31/2019 Recorded   tetanus/diphtheria/pertussis, acel(Tdap) 11/09/2019 Given   influenza virus vaccine, inactivated 10/23/2019 Recorded   influenza virus vaccine, inactivated 10/18/2018 Recorded   influenza virus vaccine, inactivated 10/26/2015 Recorded   influenza virus vaccine, inactivated 11/18/2014  Recorded   influenza virus vaccine, inactivated 10/31/2008 Recorded   influenza virus vaccine, inactivated 10/31/2007 Recorded   influenza virus vaccine, inactivated 12/12/2006 Recorded   influenza virus vaccine, inactivated 11/18/2005 Recorded   Health Maintenance  Health Maintenance  ???Pending?(in the next year)  ??? ??OverDue  ??? ? ? ?Bone Density Screening due??05/25/19??Variable frequency  ??? ? ? ?Hypertension Management-Education due??05/29/19??and every 1??year(s)  ??? ? ? ?Cognitive Screening due??01/02/21??and every 1??year(s)  ??? ? ? ?ADL Screening due??08/26/21??and every 1??year(s)  ??? ??Due?  ??? ? ? ?Zoster Vaccine due??10/14/21??Unknown Frequency  ??? ??Due In Future?  ??? ? ? ?Obesity Screening not due until??01/01/22??and every 1??year(s)  ??? ? ? ?Advance Directive not due until??01/02/22??and every 1??year(s)  ??? ? ? ?Fall Risk Assessment not due until??01/02/22??and every 1??year(s)  ??? ? ? ?Functional Assessment not due until??01/02/22??and every 1??year(s)  ??? ? ? ?Medicare Annual Wellness Exam not due until??01/26/22??and every 1??year(s)  ??? ? ? ?Hypertension Management-BMP not due until??05/11/22??and every 1??year(s)  ???Satisfied?(in the past 1 year)  ??? ??Satisfied?  ??? ? ? ?Advance Directive on??10/12/21.??Satisfied by Bettie Gordillo  ??? ? ? ?Blood Pressure Screening on??10/14/21.??Satisfied by Lexi Cuello  ??? ? ? ?Body Mass Index Check on??10/14/21.??Satisfied by Lexi Cuello  ??? ? ? ?Coronary Artery Disease Maintenance-Lipid Lowering Therapy on??10/12/21.  ??? ? ? ?Depression Screening on??10/14/21.??Satisfied by Lexi Cuello  ??? ? ? ?Diabetes Screening on??05/11/21.??Satisfied by Andrew Crain  ??? ? ? ?Fall Risk Assessment on??10/14/21.??Satisfied by Lexi Cuello  ??? ? ? ?Functional Assessment on??01/26/21.??Satisfied by Naz Hale LPN  ??? ? ? ?Hypertension Management-Blood Pressure on??10/14/21.??Satisfied by Lexi Cuello  ??? ?  ? ?Influenza Vaccine on??10/14/21.??Satisfied by Lexi Cuello.  ??? ? ? ?Lipid Screening on??05/11/21.??Satisfied by Andrew Crain  ??? ? ? ?Medicare Annual Wellness Exam on??01/26/21.??Satisfied by Manuel TORRES MD, Benedict Green  ??? ? ? ?Obesity Screening on??10/14/21.??Satisfied by Lexi Cuello  ?  Diagnostic Results  AP and lateral right hand reviewed.??Mid?phalanx fracture noted?of the thumb.

## 2022-05-05 NOTE — HISTORICAL [facility] CERNER
This is a historical note converted from Mikey. Formatting and pictures may have been removed.  Please reference Mikey for original formatting and attached multimedia. Chief Complaint  Follow up right thumb fracture. ?She is in an exos brace and has pain in the thumb when out of it.  History of Present Illness  81-year-old?female presents in follow-up right hand injury. ?Patient today for repeat examination. ?Test tip of her thumb is sore?although very mild. ?Has been compliant with brace wear thus far.  Review of Systems  Denies fevers, chills, chest pain, shortness of breath. Comprehensive review of systems performed and otherwise negative except as noted in HPI.  Physical Exam  Vitals & Measurements  T:?99.2? ?F (Oral)? BP:?138/68?  HT:?165.00?cm? WT:?77.700?kg? BMI:?28.54?  General: No acute distress, alert and oriented, healthy appearing?  HEENT: Head is atraumatic, mucous membranes are moist?  Cardiovascular: Brisk capillary refill  Lungs: Breathing non-labored?  Skin: no rashes appreciated?  Neurologic: Sensation is grossly intact distally  ?  Right hand:  Mild tenderness to the proximal phalanx of her thumb. ?Otherwise full range of motion of her MCP joint. ?Does have stiffness to her IP joint of her thumb.? No pain in her wrist no swelling in her wrist.  Assessment/Plan  1.?Fracture of phalanx of right thumb?S62.501A  ?Patients x-rays reviewed.? Appears the fracture may be old.? Discussed treatment option with the patient. ?At this point?she can wean out of the brace at home.? See her back in 3 weeks for a?exam only.   Problem List/Past Medical History  Ongoing  Afib(  Confirmed  )  HBP (High Blood Pressure)(  Confirmed  )  Historical  No qualifying data  Procedure/Surgical History  Repair Right Hand Skin, External Approach (11/09/2019)  Simple repair of superficial wounds of scalp, neck, axillae, external genitalia, trunk and/or extremities (including hands and feet); 2.5 cm or less  (11/09/2019)  Fluoroscopy of Left Heart using Low Osmolar Contrast (03/10/2018)  Fluoroscopy of Multiple Coronary Arteries using Low Osmolar Contrast (03/10/2018)  Measurement of Cardiac Sampling and Pressure, Left Heart, Percutaneous Approach (03/10/2018)  Ablation operation for arrhythmia  Aneurysmectomy  Hysterectomy   Medications  aspirin 81 mg oral tablet, CHEWABLE, 81 mg= 1 tab(s), Oral, Daily,? ?Not taking  atorvastatin 10 mg oral tablet, 10 mg= 1 tab(s), Oral, Daily  Bentyl 10 mg oral capsule, 10 mg= 1 cap(s), Oral, QID, PRN,? ?Not taking  Colon Health  conjugated estrogens 0.45 mg oral tablet,? ?Not taking  doxazosin 2 mg oral tablet, 2 mg= 1 tab(s), Oral, Daily  Finger Splint (thumb), See Instructions  flecainide 50 mg oral tablet, 50 mg= 1 tab(s), Oral, q12hr  losartan 50 mg oral tablet, 50 mg= 1 tab(s), Oral, BID  metoprolol succinate 100 mg oral tablet extended release, Oral, Daily  Probiotic Formula oral capsule,? ?Not taking  Vitamin D3 1000 intl units oral tablet, Oral, Daily  Voltaren 1% topical gel, 2 gm, TOP, QID, 1 refills  Xarelto 20mg Tablet, 20 mg= 1 tab(s), Oral, Daily  Zofran ODT 4 mg oral tablet, disintegrating, 4 mg= 1 tab(s), Oral, q6hr, PRN  Allergies  No Known Allergies  No Known Medication Allergies  Social History  Abuse/Neglect  No, No, Yes, 11/09/2021  Alcohol  Never, 10/14/2021  Employment/School  Retired, 08/19/2015  Exercise  Exercise duration: 0., 03/17/2016  Home/Environment  Lives with Spouse., 08/19/2015  Nutrition/Health  Regular, 08/19/2015  Sexual  Sexually active: Yes., 05/25/2017  Substance Use  Never, 08/19/2015  Tobacco  Never (less than 100 in lifetime), No, 11/09/2021  Family History  Heart disease: Father.  Immunizations  Vaccine Date Status   influenza virus vaccine, inactivated 11/09/2021 Given   COVID-19 MRNA, LNP-S, PF- Pfizer 10/15/2021 Given   COVID-19 MRNA, LNP-S, PF- Pfizer 02/10/2021 Given   COVID-19 MRNA, JONATAN-S, - Pfizer 01/20/2021 Given   COVID-19 MRNA,  LNP-S, PF- Pfizer 01/20/2021 Recorded   influenza virus vaccine, inactivated 11/14/2020 Recorded   pneumococcal 23-valent vaccine 12/31/2019 Recorded   tetanus/diphtheria/pertussis, acel(Tdap) 11/09/2019 Given   influenza virus vaccine, inactivated 10/23/2019 Recorded   influenza virus vaccine, inactivated 10/18/2018 Recorded   influenza virus vaccine, inactivated 10/26/2015 Recorded   influenza virus vaccine, inactivated 11/18/2014 Recorded   influenza virus vaccine, inactivated 10/31/2008 Recorded   influenza virus vaccine, inactivated 10/31/2007 Recorded   influenza virus vaccine, inactivated 12/12/2006 Recorded   influenza virus vaccine, inactivated 11/18/2005 Recorded   Health Maintenance  Health Maintenance  ???Pending?(in the next year)  ??? ??OverDue  ??? ? ? ?Bone Density Screening due??05/25/19??Variable frequency  ??? ? ? ?Hypertension Management-Education due??05/29/19??and every 1??year(s)  ??? ? ? ?Cognitive Screening due??01/02/21??and every 1??year(s)  ??? ? ? ?ADL Screening due??08/26/21??and every 1??year(s)  ??? ??Due?  ??? ? ? ?Zoster Vaccine due??11/09/21??Unknown Frequency  ??? ??Due In Future?  ??? ? ? ?Obesity Screening not due until??01/01/22??and every 1??year(s)  ??? ? ? ?Advance Directive not due until??01/02/22??and every 1??year(s)  ??? ? ? ?Fall Risk Assessment not due until??01/02/22??and every 1??year(s)  ??? ? ? ?Functional Assessment not due until??01/02/22??and every 1??year(s)  ??? ? ? ?Medicare Annual Wellness Exam not due until??01/26/22??and every 1??year(s)  ??? ? ? ?Hypertension Management-BMP not due until??05/11/22??and every 1??year(s)  ??? ? ? ?Blood Pressure Screening not due until??10/14/22??and every 1??year(s)  ??? ? ? ?Body Mass Index Check not due until??10/14/22??and every 1??year(s)  ??? ? ? ?Depression Screening not due until??10/14/22??and every 1??year(s)  ??? ? ? ?Hypertension Management-Blood Pressure not due until??10/14/22??and every  1??year(s)  ???Satisfied?(in the past 1 year)  ??? ??Satisfied?  ??? ? ? ?Advance Directive on??10/12/21.??Satisfied by Bettie Gordillo  ??? ? ? ?Blood Pressure Screening on??11/09/21.??Satisfied by Edilia Barnett  ??? ? ? ?Body Mass Index Check on??11/09/21.??Satisfied by Edilia Barnett  ??? ? ? ?Coronary Artery Disease Maintenance-Lipid Lowering Therapy on??10/12/21.  ??? ? ? ?Depression Screening on??10/14/21.??Satisfied by Lexi Cuello  ??? ? ? ?Diabetes Screening on??05/11/21.??Satisfied by Andrew Crain  ??? ? ? ?Fall Risk Assessment on??11/09/21.??Satisfied by Edilia Barnett  ??? ? ? ?Functional Assessment on??01/26/21.??Satisfied by Naz Hale LPN  ??? ? ? ?Hypertension Management-Blood Pressure on??11/09/21.??Satisfied by Edilia Barnett  ??? ? ? ?Influenza Vaccine on??11/09/21.??Satisfied by Gali Bailey  ??? ? ? ?Lipid Screening on??05/11/21.??Satisfied by Andrew Crain  ??? ? ? ?Medicare Annual Wellness Exam on??01/26/21.??Satisfied by Benedict Jackson II, MD  ??? ? ? ?Obesity Screening on??11/09/21.??Satisfied by Edilia Barnett  ?  Diagnostic Results  AP lateral right hand reviewed. ?Patient with no displaced fracture noted. ?May have old?injury to her thumb.

## 2022-05-17 ENCOUNTER — LAB VISIT (OUTPATIENT)
Dept: LAB | Facility: HOSPITAL | Age: 82
End: 2022-05-17
Attending: INTERNAL MEDICINE
Payer: MEDICARE

## 2022-05-17 DIAGNOSIS — E78.5 HYPERLIPIDEMIA, UNSPECIFIED HYPERLIPIDEMIA TYPE: ICD-10-CM

## 2022-05-17 DIAGNOSIS — Z51.81 ENCOUNTER FOR THERAPEUTIC DRUG MONITORING: Primary | ICD-10-CM

## 2022-05-17 DIAGNOSIS — I10 ESSENTIAL HYPERTENSION, MALIGNANT: ICD-10-CM

## 2022-05-17 PROCEDURE — 36415 COLL VENOUS BLD VENIPUNCTURE: CPT

## 2022-05-17 PROCEDURE — 30000890 MAYO GENERIC ORDERABLE

## 2022-05-17 PROCEDURE — 80181 DRUG ASSAY FLECAINIDE: CPT

## 2022-05-19 LAB — MAYO GENERIC ORDERABLE RESULT: NORMAL

## 2022-05-20 LAB — BEAKER SEE SCANNED REPORT: NORMAL

## 2022-10-09 ENCOUNTER — HOSPITAL ENCOUNTER (EMERGENCY)
Facility: HOSPITAL | Age: 82
Discharge: HOME | End: 2022-10-09
Attending: STUDENT IN AN ORGANIZED HEALTH CARE EDUCATION/TRAINING PROGRAM
Payer: MEDICARE

## 2022-10-09 VITALS
WEIGHT: 180 LBS | BODY MASS INDEX: 29.99 KG/M2 | HEART RATE: 55 BPM | RESPIRATION RATE: 15 BRPM | SYSTOLIC BLOOD PRESSURE: 129 MMHG | OXYGEN SATURATION: 94 % | DIASTOLIC BLOOD PRESSURE: 63 MMHG | HEIGHT: 65 IN | TEMPERATURE: 97 F

## 2022-10-09 DIAGNOSIS — S01.112A EYEBROW LACERATION, LEFT, INITIAL ENCOUNTER: ICD-10-CM
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
## 2022-10-09 DIAGNOSIS — S42.352A CLOSED DISPLACED COMMINUTED FRACTURE OF SHAFT OF LEFT HUMERUS, INITIAL ENCOUNTER: Primary | ICD-10-CM

## 2022-10-09 LAB
ALBUMIN SERPL-MCNC: 3.3 GM/DL (ref 3.4–4.8)
ALBUMIN/GLOB SERPL: 1 RATIO (ref 1.1–2)
ALP SERPL-CCNC: 116 UNIT/L (ref 40–150)
ALT SERPL-CCNC: 11 UNIT/L (ref 0–55)
APTT PPP: 38.5 SECONDS (ref 23.2–33.7)
AST SERPL-CCNC: 12 UNIT/L (ref 5–34)
BASOPHILS # BLD AUTO: 0.03 X10(3)/MCL (ref 0–0.2)
BASOPHILS NFR BLD AUTO: 0.3 %
BILIRUBIN DIRECT+TOT PNL SERPL-MCNC: 0.7 MG/DL
BUN SERPL-MCNC: 15.7 MG/DL (ref 9.8–20.1)
CALCIUM SERPL-MCNC: 9.3 MG/DL (ref 8.4–10.2)
CHLORIDE SERPL-SCNC: 108 MMOL/L (ref 98–107)
CO2 SERPL-SCNC: 25 MMOL/L (ref 23–31)
CREAT SERPL-MCNC: 0.7 MG/DL (ref 0.55–1.02)
EOSINOPHIL # BLD AUTO: 0.13 X10(3)/MCL (ref 0–0.9)
EOSINOPHIL NFR BLD AUTO: 1.4 %
ERYTHROCYTE [DISTWIDTH] IN BLOOD BY AUTOMATED COUNT: 13.4 % (ref 11.5–17)
ETHANOL SERPL-MCNC: <10 MG/DL
GFR SERPLBLD CREATININE-BSD FMLA CKD-EPI: >60 MLS/MIN/1.73/M2
GLOBULIN SER-MCNC: 3.2 GM/DL (ref 2.4–3.5)
GLUCOSE SERPL-MCNC: 149 MG/DL (ref 82–115)
GROUP & RH: NORMAL
HCT VFR BLD AUTO: 39.7 % (ref 37–47)
HGB BLD-MCNC: 13 GM/DL (ref 12–16)
IMM GRANULOCYTES # BLD AUTO: 0.03 X10(3)/MCL (ref 0–0.04)
IMM GRANULOCYTES NFR BLD AUTO: 0.3 %
INDIRECT COOMBS GEL: NORMAL
INR BLD: 2.09 (ref 0–1.3)
LACTATE SERPL-SCNC: 1.6 MMOL/L (ref 0.5–2.2)
LYMPHOCYTES # BLD AUTO: 2.04 X10(3)/MCL (ref 0.6–4.6)
LYMPHOCYTES NFR BLD AUTO: 22.4 %
MCH RBC QN AUTO: 31.3 PG (ref 27–31)
MCHC RBC AUTO-ENTMCNC: 32.7 MG/DL (ref 33–36)
MCV RBC AUTO: 95.7 FL (ref 80–94)
MONOCYTES # BLD AUTO: 0.82 X10(3)/MCL (ref 0.1–1.3)
MONOCYTES NFR BLD AUTO: 9 %
NEUTROPHILS # BLD AUTO: 6 X10(3)/MCL (ref 2.1–9.2)
NEUTROPHILS NFR BLD AUTO: 66.6 %
NRBC BLD AUTO-RTO: 0 %
PLATELET # BLD AUTO: 136 X10(3)/MCL (ref 130–400)
PMV BLD AUTO: 11 FL (ref 7.4–10.4)
POTASSIUM SERPL-SCNC: 4.1 MMOL/L (ref 3.5–5.1)
PROT SERPL-MCNC: 6.5 GM/DL (ref 5.8–7.6)
PROTHROMBIN TIME: 23.1 SECONDS (ref 12.5–14.5)
RBC # BLD AUTO: 4.15 X10(6)/MCL (ref 4.2–5.4)
SODIUM SERPL-SCNC: 137 MMOL/L (ref 136–145)
WBC # SPEC AUTO: 9.1 X10(3)/MCL (ref 4.5–11.5)

## 2022-10-09 PROCEDURE — 83605 ASSAY OF LACTIC ACID: CPT | Performed by: STUDENT IN AN ORGANIZED HEALTH CARE EDUCATION/TRAINING PROGRAM

## 2022-10-09 PROCEDURE — 99285 EMERGENCY DEPT VISIT HI MDM: CPT | Mod: 25

## 2022-10-09 PROCEDURE — 25000003 PHARM REV CODE 250: Performed by: STUDENT IN AN ORGANIZED HEALTH CARE EDUCATION/TRAINING PROGRAM

## 2022-10-09 PROCEDURE — 85610 PROTHROMBIN TIME: CPT | Performed by: STUDENT IN AN ORGANIZED HEALTH CARE EDUCATION/TRAINING PROGRAM

## 2022-10-09 PROCEDURE — G0390 TRAUMA RESPONS W/HOSP CRITI: HCPCS

## 2022-10-09 PROCEDURE — 86901 BLOOD TYPING SEROLOGIC RH(D): CPT | Performed by: STUDENT IN AN ORGANIZED HEALTH CARE EDUCATION/TRAINING PROGRAM

## 2022-10-09 PROCEDURE — 96374 THER/PROPH/DIAG INJ IV PUSH: CPT | Mod: 59

## 2022-10-09 PROCEDURE — 25500020 PHARM REV CODE 255: Performed by: STUDENT IN AN ORGANIZED HEALTH CARE EDUCATION/TRAINING PROGRAM

## 2022-10-09 PROCEDURE — 63600175 PHARM REV CODE 636 W HCPCS: Performed by: STUDENT IN AN ORGANIZED HEALTH CARE EDUCATION/TRAINING PROGRAM

## 2022-10-09 PROCEDURE — 82077 ASSAY SPEC XCP UR&BREATH IA: CPT | Performed by: STUDENT IN AN ORGANIZED HEALTH CARE EDUCATION/TRAINING PROGRAM

## 2022-10-09 PROCEDURE — 85025 COMPLETE CBC W/AUTO DIFF WBC: CPT | Performed by: STUDENT IN AN ORGANIZED HEALTH CARE EDUCATION/TRAINING PROGRAM

## 2022-10-09 PROCEDURE — 36415 COLL VENOUS BLD VENIPUNCTURE: CPT | Performed by: STUDENT IN AN ORGANIZED HEALTH CARE EDUCATION/TRAINING PROGRAM

## 2022-10-09 PROCEDURE — 85730 THROMBOPLASTIN TIME PARTIAL: CPT | Performed by: STUDENT IN AN ORGANIZED HEALTH CARE EDUCATION/TRAINING PROGRAM

## 2022-10-09 PROCEDURE — 96375 TX/PRO/DX INJ NEW DRUG ADDON: CPT | Mod: 59

## 2022-10-09 PROCEDURE — 80053 COMPREHEN METABOLIC PANEL: CPT | Performed by: STUDENT IN AN ORGANIZED HEALTH CARE EDUCATION/TRAINING PROGRAM

## 2022-10-09 PROCEDURE — 99900035 HC TECH TIME PER 15 MIN (STAT)

## 2022-10-09 RX ORDER — HYDROCODONE BITARTRATE AND ACETAMINOPHEN 5; 325 MG/1; MG/1
1 TABLET ORAL EVERY 8 HOURS PRN
Qty: 8 TABLET | Refills: 0 | Status: SHIPPED | OUTPATIENT
Start: 2022-10-09 | End: 2022-10-09 | Stop reason: SDUPTHER

## 2022-10-09 RX ORDER — ONDANSETRON 4 MG/1
4 TABLET, ORALLY DISINTEGRATING ORAL EVERY 8 HOURS PRN
Qty: 12 TABLET | Refills: 0 | Status: SHIPPED | OUTPATIENT
Start: 2022-10-09 | End: 2022-10-09 | Stop reason: SDUPTHER

## 2022-10-09 RX ORDER — ONDANSETRON 4 MG/1
4 TABLET, ORALLY DISINTEGRATING ORAL EVERY 8 HOURS PRN
Qty: 12 TABLET | Refills: 0 | Status: SHIPPED | OUTPATIENT
Start: 2022-10-09 | End: 2023-02-07

## 2022-10-09 RX ORDER — HYDROCODONE BITARTRATE AND ACETAMINOPHEN 5; 325 MG/1; MG/1
1 TABLET ORAL EVERY 8 HOURS PRN
Qty: 8 TABLET | Refills: 0 | Status: ON HOLD | OUTPATIENT
Start: 2022-10-09 | End: 2022-10-26 | Stop reason: HOSPADM

## 2022-10-09 RX ORDER — ONDANSETRON 2 MG/ML
4 INJECTION INTRAMUSCULAR; INTRAVENOUS
Status: COMPLETED | OUTPATIENT
Start: 2022-10-09 | End: 2022-10-09

## 2022-10-09 RX ORDER — METHOCARBAMOL 500 MG/1
500 TABLET, FILM COATED ORAL 3 TIMES DAILY
Qty: 15 TABLET | Refills: 0 | Status: SHIPPED | OUTPATIENT
Start: 2022-10-09 | End: 2022-10-14

## 2022-10-09 RX ORDER — MORPHINE SULFATE 4 MG/ML
4 INJECTION, SOLUTION INTRAMUSCULAR; INTRAVENOUS
Status: COMPLETED | OUTPATIENT
Start: 2022-10-09 | End: 2022-10-09

## 2022-10-09 RX ORDER — METHOCARBAMOL 500 MG/1
500 TABLET, FILM COATED ORAL 3 TIMES DAILY
Qty: 15 TABLET | Refills: 0 | Status: SHIPPED | OUTPATIENT
Start: 2022-10-09 | End: 2022-10-09 | Stop reason: SDUPTHER

## 2022-10-09 RX ADMIN — Medication: at 01:10

## 2022-10-09 RX ADMIN — MORPHINE SULFATE 4 MG: 4 INJECTION INTRAVENOUS at 03:10

## 2022-10-09 RX ADMIN — ONDANSETRON 4 MG: 2 INJECTION INTRAMUSCULAR; INTRAVENOUS at 04:10

## 2022-10-09 RX ADMIN — IOPAMIDOL 100 ML: 755 INJECTION, SOLUTION INTRAVENOUS at 01:10

## 2022-10-09 NOTE — ED PROVIDER NOTES
Encounter Date: 10/9/2022    SCRIBE #1 NOTE: I, Js Viktoria, am scribing for, and in the presence of,  Luisito Soriano MD. I have scribed the following portions of the note - Other sections scribed: HPI, ROS, PE.     History   No chief complaint on file.    An 84 y/o female with a cardiac history presents to [facility] ED after tripping over the wire to her vacuum  and falling onto the ground at home earlier today. Pt has L arm/shoulder pain. Pt reports that she takes Xarelto.   Pt denies a LOC.     The history is provided by the patient.   Fall  The accident occurred today. The fall occurred while walking. Distance fallen: ground level fall. The point of impact was the left shoulder. The pain is present in the left shoulder. Pain scale: moderate. Pertinent negatives include no neck pain, no fever, no numbness, no abdominal pain, no nausea, no vomiting, no hematuria, no headaches and no loss of consciousness. Treatment on scene includes A c-collar (fentanyl).   Review of patient's allergies indicates:  Not on File  History reviewed. No pertinent past medical history.  No past surgical history on file.  History reviewed. No pertinent family history.     Review of Systems   Constitutional:  Negative for chills, fatigue and fever.   HENT:  Negative for congestion, ear discharge, ear pain, nosebleeds, rhinorrhea and sore throat.    Eyes:  Negative for photophobia, pain, discharge and redness.   Respiratory:  Negative for cough, chest tightness, shortness of breath and wheezing.    Cardiovascular:  Negative for chest pain and leg swelling.   Gastrointestinal:  Negative for abdominal pain, blood in stool, constipation, diarrhea, nausea and vomiting.   Genitourinary:  Negative for dysuria, frequency, hematuria and urgency.   Musculoskeletal:  Negative for arthralgias, myalgias and neck pain.        L arm/shoulder pain   Skin:  Negative for color change and rash.   Neurological:  Negative for dizziness, seizures,  loss of consciousness, weakness, numbness and headaches.        Pt denies a LOC.      Physical Exam     Initial Vitals [10/09/22 1257]   BP Pulse Resp Temp SpO2   (!) 192/78 63 18 97.2 °F (36.2 °C) (!) 94 %      MAP       --         Physical Exam    Nursing note and vitals reviewed.  Constitutional: She appears well-developed and well-nourished. She is not diaphoretic. No distress.   HENT:   Head: Normocephalic.   Right Ear: External ear normal.   Left Ear: External ear normal.   Nose: Nose normal.   Mouth/Throat: Oropharynx is clear and moist.   Pt has a 1.5 cm laceration superior to the L eye.    Eyes: Conjunctivae and EOM are normal. Pupils are equal, round, and reactive to light. Right eye exhibits no discharge. Left eye exhibits no discharge. No scleral icterus.   Pt's pupils are 2 mm and reactive.    Neck: Neck supple. No tracheal deviation present.   Normal range of motion.  Cardiovascular:  Normal rate, regular rhythm, normal heart sounds and intact distal pulses.     Exam reveals no gallop and no friction rub.       No murmur heard.  Pulses:       Radial pulses are 2+ on the right side and 2+ on the left side.   Pulmonary/Chest: Breath sounds normal. No stridor. No respiratory distress. She has no wheezes. She has no rhonchi. She has no rales. She exhibits no tenderness.   Abdominal: Abdomen is soft. Bowel sounds are normal. She exhibits no distension and no mass. There is no abdominal tenderness. There is no guarding.   Musculoskeletal:         General: No edema.      Cervical back: Normal range of motion and neck supple.      Comments: Pt has an obvious deformity to the L humerus. Pt's pelvis is stable. Pt has painless flexion of her knees bilaterally.      Neurological: She is alert and oriented to person, place, and time. No cranial nerve deficit or sensory deficit. GCS score is 15. GCS eye subscore is 4. GCS verbal subscore is 5. GCS motor subscore is 6.   Pt's L UE strength/sensation is intact.     Skin: Skin is warm and dry. Capillary refill takes less than 2 seconds. No rash noted. No erythema. No pallor.   Pt has a 1.5 cm laceration superior to the L eye. Pt has a skin tear to the anterior aspect of her L knee.        ED Course   Lac Repair    Date/Time: 10/9/2022 3:32 PM  Performed by: Luisito Soriano MD  Authorized by: Luisito Soriano MD     Consent:     Consent obtained:  Verbal    Consent given by:  Patient    Risks, benefits, and alternatives were discussed: yes      Risks discussed:  Pain, poor cosmetic result and poor wound healing    Alternatives discussed:  No treatment  Universal protocol:     Patient identity confirmed:  Verbally with patient  Anesthesia:     Anesthesia method:  Topical application    Topical anesthetic:  LET  Laceration details:     Location:  Face    Face location:  L eyebrow    Length (cm):  1.5  Pre-procedure details:     Preparation:  Patient was prepped and draped in usual sterile fashion  Exploration:     Limited defect created (wound extended): no      Hemostasis achieved with:  LET and direct pressure    Contaminated: no    Treatment:     Area cleansed with:  Chlorhexidine    Amount of cleaning:  Standard    Irrigation solution:  Sterile water    Irrigation volume:  50    Irrigation method:  Syringe    Visualized foreign bodies/material removed: no    Skin repair:     Repair method:  Sutures    Suture size:  5-0    Suture material:  Fast-absorbing gut    Suture technique:  Simple interrupted    Number of sutures:  4  Approximation:     Approximation:  Close  Repair type:     Repair type:  Simple  Post-procedure details:     Dressing:  Open (no dressing)    Procedure completion:  Tolerated well, no immediate complications  Labs Reviewed   COMPREHENSIVE METABOLIC PANEL - Abnormal; Notable for the following components:       Result Value    Chloride 108 (*)     Glucose Level 149 (*)     Albumin Level 3.3 (*)     Albumin/Globulin Ratio 1.0 (*)     All other components  within normal limits   PROTIME-INR - Abnormal; Notable for the following components:    PT 23.1 (*)     INR 2.09 (*)     All other components within normal limits   APTT - Abnormal; Notable for the following components:    PTT 38.5 (*)     All other components within normal limits   CBC WITH DIFFERENTIAL - Abnormal; Notable for the following components:    RBC 4.15 (*)     MCV 95.7 (*)     MCH 31.3 (*)     MCHC 32.7 (*)     MPV 11.0 (*)     All other components within normal limits   LACTIC ACID, PLASMA - Normal   ALCOHOL,MEDICAL (ETHANOL) - Normal   CBC W/ AUTO DIFFERENTIAL    Narrative:     The following orders were created for panel order CBC auto differential.  Procedure                               Abnormality         Status                     ---------                               -----------         ------                     CBC with Differential[[id number]]        Abnormal            Final result                 Please view results for these tests on the individual orders.   URINALYSIS, REFLEX TO URINE CULTURE   DRUG SCREEN, URINE (BEAKER)   TYPE & SCREEN          Imaging Results              CT Cervical Spine Without Contrast (Final result)  Result time 10/09/22 13:53:49      Final result by Meme Flynn MD (10/09/22 13:53:49)                   Impression:      No appreciable acute fracture.    Moderate spondylosis.      Electronically signed by: Meme Flynn  Date:    10/09/2022  Time:    13:53               Narrative:    EXAMINATION:  CT CERVICAL SPINE WITHOUT CONTRAST    CLINICAL HISTORY:  Trauma;    TECHNIQUE:  Low dose helical acquired images with axial, sagittal and coronal reformations though the cervical spine.  Contrast was not administered.    All CT scans at this location are performed using dose optimization techniques as appropriate to a performed exam including the following automated exposure control, adjustment of the mA and/or kV according to patient size and/or use of  iterative reconstruction technique    DLP: 541 mGycm    COMPARISON:  No relevant prior available for comparison.    FINDINGS:  BONES: No fracture.  Trace anterolisthesis of C7 on T1. The dens is intact, the lateral masses of C1 are normally aligned, and the atlantodental interval is normal. Multilevel left facet arthropathy.  Facet and uncovertebral hypertrophy contribute to neural foraminal stenosis at C5-C6 and C6-C7 on the left.    DISCS: Multilevel disc space narrowing, severe at C5-C6 and C6-C7.    SPINAL CANAL: No osseous narrowing of the spinal canal.    SOFT TISSUES: Regional soft tissues are unremarkable.    LUNG APICES: Clear    OTHER: There are cervical and cavernous carotid artery calcifications.                                       CT Chest Abdomen Pelvis With Contrast (xpd) (Final result)  Result time 10/09/22 13:53:28      Final result by Peter Mitchell MD (10/09/22 13:53:28)                   Impression:      No sequela of trauma involving the chest, abdomen, pelvis.  Other secondary findings as above.      Electronically signed by: Peter Mitchell MD  Date:    10/09/2022  Time:    13:53               Narrative:    EXAMINATION:  CT CHEST ABDOMEN PELVIS WITH CONTRAST (XPD)    CLINICAL HISTORY:  Trauma;    TECHNIQUE:  Multiple cross-sectional images of the chest, abdomen and pelvis were obtained after the intravenous administration of contrast.  Coronal and sagittal reformatted images obtained.  An automated dose exposure technique was utilized this limits radiation does the patient.    COMPARISON:  None    FINDINGS:  Chest:    Heart size within normal limits with coronary artery calcifications.  The course and caliber of the thoracic aorta is normal with a triple vessel aortic arch.  No evidence for aortic injury or mediastinal hematoma.  The main pulmonary artery is of normal caliber.  No evidence for hilar or mediastinal adenopathy.    Dependent atelectatic changes lungs.  No evidence for consolidation.   No pulmonary contusion, laceration, or pneumothorax.  No pleural thickening or pleural effusion.  The trachea and airway are patent.    Abdomen/pelvis:    Nonspecific hypodensities identified throughout the liver.  These are nonspecific and statistically represent simple cyst.  Gallbladder is present.  The spleen, adrenals, and pancreas are normal.  Bosniak type 1 cysts are identified with peripelvic cyst.    Small hiatal hernia.  Small bowel is of normal caliber.  Colon is of normal caliber.  The appendix is not identified.    The uterus is surgically absent.  No evidence for adnexal mass.  The bladder is under distended with pelvic floor lacks a shin.  No evidence for aortic injury.  The course and caliber of the abdominal aorta is normal with scattered calcified atheromatous disease.  No free fluid in the abdomen pelvis.  No evidence for adenopathy.    No suspicious osseous lesions.  Spondylotic changes are identified.  The soft tissues are normal.                                       CT Head Without Contrast (Final result)  Result time 10/09/22 13:49:37      Final result by Meme Flynn MD (10/09/22 13:49:37)                   Impression:      No appreciable acute intracranial abnormality.    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  There is associated beam hardening artifact which degrades evaluation.      Electronically signed by: Meme Flynn  Date:    10/09/2022  Time:    13:49               Narrative:    EXAMINATION:  CT HEAD WITHOUT CONTRAST    CLINICAL HISTORY:  Trauma;    TECHNIQUE:  Low dose axial CT images obtained throughout the head without intravenous contrast.  Axial, sagittal and coronal reconstructions were performed and interpreted.    DLP: 1136 mGycm    All CT scans at this location are performed using dose optimization techniques as appropriate to a performed exam including the following automated exposure control, adjustment  of the mA and/or kV according to patient size and/or use of iterative reconstruction technique    COMPARISON:  No relevant prior available for comparison.    FINDINGS:  BRAIN: There are post treatment changes of prior aneurysm clipping and coiling the level of the [id number].  There is associated beam hardening artifact which obscures evaluation.  Gray white differentiation is grossly maintained.  Minimal periventricular and subcortical white matter changes most compatible with chronic small vessel ischemic disease.  No appreciable hemorrhage.  No edema. No mass effect or midline shift.  The posterior fossa and midline structures are unremarkable.    VENTRICLES: Normal in size and configuration.    EXTRA-AXIAL: No abnormal extra-axial collections.    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    SINUSES AND MASTOIDS: Mucoperiosteal thickening at the right maxillary sinus.  Patchy opacification of the ethmoid air cells.    OTHER: Left supraorbital hematoma.                                       X-Ray Pelvis Routine AP (Final result)  Result time 10/09/22 13:57:03      Final result by Meme Flynn MD (10/09/22 13:57:03)                   Impression:      No acute osseous abnormality.      Electronically signed by: Meme Flynn  Date:    10/09/2022  Time:    13:57               Narrative:    EXAMINATION:  XR PELVIS ROUTINE AP    CLINICAL HISTORY:  r/o bleeding or hemorrhage;    TECHNIQUE:  AP view of the pelvis was performed.    COMPARISON:  None.    FINDINGS:  No appreciable fracture. No dislocation.    Regional soft tissues are unremarkable.                                       X-Ray Humerus 2 View Left (Final result)  Result time 10/09/22 13:59:09      Final result by Meme Flynn MD (10/09/22 13:59:09)                   Impression:      Angulated fracture of the humeral diaphysis.      Electronically signed by: Meme  Gee  Date:    10/09/2022  Time:    13:59               Narrative:    EXAMINATION:  XR HUMERUS 2 VIEW LEFT    CLINICAL HISTORY:  Unspecified fall, initial encounter    TECHNIQUE:  Two views of the left humerus were obtained.    COMPARISON:  None.    FINDINGS:  There is a comminuted fracture of the proximal to mid diaphysis of the humerus with 30 degrees posterolateral apex angulation.    Grossly normal glenohumeral alignment.  No orthogonal view of the shoulder provided.    Regional soft tissues are normal.                                       X-Ray Chest 1 View (Final result)  Result time 10/09/22 13:55:05      Final result by Peter Mitchell MD (10/09/22 13:55:05)                   Impression:      No acute abnormality.      Electronically signed by: Peter Mitchell MD  Date:    10/09/2022  Time:    13:55               Narrative:    EXAMINATION:  XR CHEST 1 VIEW    CLINICAL HISTORY:  r/o bleeding or hemorrhage;    TECHNIQUE:  Single frontal view of the chest was performed.    COMPARISON:  None    FINDINGS:  The lungs are clear, with normal appearance of pulmonary vasculature and no pleural effusion or pneumothorax.    The cardiac silhouette is normal in size. The hilar and mediastinal contours are unremarkable.    Bones are intact.                                       Medications   LETS (LIDOcaine-TETRAcaine-EPINEPHrine) gel solution ( Topical (Top) Given 10/9/22 1300)   iopamidoL (ISOVUE-370) injection 100 mL (100 mLs Intravenous Given 10/9/22 1346)   morphine injection 4 mg (4 mg Intravenous Given 10/9/22 1500)   ondansetron injection 4 mg (4 mg Intravenous Given 10/9/22 1600)     Medical Decision Making:   Initial Assessment:   Patient presents after fall, mechanical fall tripped over vacuum  wire  Differential Diagnosis:   Head bleed, fracture, abrasion, laceration, solid organ injury, hollow organ injury, vascular injury, nervous injury  Clinical Tests:   Lab Tests: Reviewed and Ordered  Radiological  Study: Reviewed and Ordered  Medical Tests: Reviewed and Ordered  ED Management:  Patient is awake alert oriented, somewhat hard of hearing.  She is very pleasant.  Her workup is remarkable for a minimally displaced left humerus fracture.  Orthopedic surgery is consulted recommends discharge home with sling, follow up as outpatient.  Patient's laceration sutured here in the emergency department.  She tolerates procedure well.  I discussed findings at length with both patient and  room.  They are amenable comfortable discharge home.  She will be discharged home with Robaxin, Norco, Zofran. Return precautions given.  Questions invited, questions answered to the best my ability.  Patient discharged home condition stable.          Scribe Attestation:   Scribe #1: I performed the above scribed service and the documentation accurately describes the services I performed. I attest to the accuracy of the note.    Attending Attestation:           Physician Attestation for Scribe:  Physician Attestation Statement for Scribe #1: I, Luisito Soriano MD, reviewed documentation, as scribed by Js Blum in my presence, and it is both accurate and complete.                        Clinical Impression:   Final diagnoses:  [W19.XXXA] Fall  [S42.352A] Closed displaced comminuted fracture of shaft of left humerus, initial encounter (Primary)  [S01.112A] Eyebrow laceration, left, initial encounter        ED Disposition Condition    Discharge Stable          ED Prescriptions       Medication Sig Dispense Start Date End Date Auth. Provider    HYDROcodone-acetaminophen (NORCO) 5-325 mg per tablet  (Status: Discontinued) Take 1 tablet by mouth every 8 (eight) hours as needed for Pain. 8 tablet 10/9/2022 10/9/2022 Luisito Soriano MD    ondansetron (ZOFRAN-ODT) 4 MG TbDL  (Status: Discontinued) Take 1 tablet (4 mg total) by mouth every 8 (eight) hours as needed (nausea vomiting). 12 tablet 10/9/2022 10/9/2022 Luisito CAAL  MD Christie    methocarbamoL (ROBAXIN) 500 MG Tab  (Status: Discontinued) Take 1 tablet (500 mg total) by mouth 3 (three) times daily. for 5 days 15 tablet 10/9/2022 10/9/2022 Luisito Soriano MD    HYDROcodone-acetaminophen (NORCO) 5-325 mg per tablet Take 1 tablet by mouth every 8 (eight) hours as needed for Pain. 8 tablet 10/9/2022 -- Luisito Soriano MD    methocarbamoL (ROBAXIN) 500 MG Tab Take 1 tablet (500 mg total) by mouth 3 (three) times daily. for 5 days 15 tablet 10/9/2022 10/14/2022 Luisito Soriano MD    ondansetron (ZOFRAN-ODT) 4 MG TbDL Take 1 tablet (4 mg total) by mouth every 8 (eight) hours as needed (nausea vomiting). 12 tablet 10/9/2022 -- Luisito Soriano MD          Follow-up Information       Follow up With Specialties Details Why Contact Info    Doc Scherer, DO Orthopedic Surgery Schedule an appointment as soon as possible for a visit in 1 day Please call Monday 10/10 for follow up for L humerus fracture [address]  [address]  [facility] [address]  [phone]               Luisito Soriano MD  10/09/22 9273

## 2022-10-09 NOTE — DISCHARGE INSTRUCTIONS
Thanks for letting us take care of you today!  It is our goal to give you courteous care and to keep you comfortable and informed, if you have any questions before you leave I will be happy to try and answer them.    Here is some advice after your visit:    Your visit in the emergency department is NOT definitive care - please follow-up with your primary care doctor and/or specialist within 1-2 days. Please return to the emergency department if you develop worsening symptoms including: fever, chills, chest pain, shortness of breath, weakness, numbness, tingling, nausea, vomiting, inability to eat, drink, or take your medication. Please return if you have any worsening in your condition or if you have any other concerns.    If you had radiology exams like an XRAY or CT in the emergency Department the interpreation on them may be preliminary - there may be less time sensitive findings on the reports please obtain these reports within 24 hours from the hospital or by using your out on your mobile phone to access records.  Bring these to your primary care doctor and/or specialist for further review of incidental findings.    Please review any LAB WORK from your visit today with your primary care physician.    You have been prescribed hydrocodone/acetaminophen (Norco).  This is a narcotic/opioid medication.  Please be aware this may make you drowsy, do not operate heavy machinery or drive when taking this medication.  Also be aware this medication contains acetaminophen/Tylenol do not take any additional acetaminophen/Tylenol when taking this medication.    Contact info for Dr. Scherer provided. Please call tomorrow 10/10 for follow up. Please return to the ED if you have worsening pain, swelling, weakness, numbness, tingling to your left arm.

## 2022-10-10 ENCOUNTER — HOSPITAL ENCOUNTER (OUTPATIENT)
Dept: RADIOLOGY | Facility: CLINIC | Age: 82
Discharge: HOME | End: 2022-10-10
Attending: REHABILITATION UNIT
Payer: MEDICARE

## 2022-10-10 ENCOUNTER — OFFICE VISIT (OUTPATIENT)
Dept: ORTHOPEDICS | Facility: CLINIC | Age: 82
End: 2022-10-10
Payer: MEDICARE

## 2022-10-10 VITALS — WEIGHT: 180 LBS | BODY MASS INDEX: 29.99 KG/M2 | HEIGHT: 65 IN

## 2022-10-10 DIAGNOSIS — S42.302A CLOSED FRACTURE OF SHAFT OF LEFT HUMERUS, UNSPECIFIED FRACTURE MORPHOLOGY, INITIAL ENCOUNTER: Primary | ICD-10-CM

## 2022-10-10 DIAGNOSIS — M25.511 RIGHT SHOULDER PAIN, UNSPECIFIED CHRONICITY: ICD-10-CM

## 2022-10-10 PROCEDURE — 73030 XR SHOULDER COMPLETE 2 OR MORE VIEWS RIGHT: ICD-10-PCS | Mod: RT,,, | Performed by: REHABILITATION UNIT

## 2022-10-10 PROCEDURE — 73030 X-RAY EXAM OF SHOULDER: CPT | Mod: RT,,, | Performed by: REHABILITATION UNIT

## 2022-10-10 PROCEDURE — 24500 CLTX HUMRL SHFT FX W/O MNPJ: CPT | Mod: LT,,, | Performed by: REHABILITATION UNIT

## 2022-10-10 PROCEDURE — 99214 PR OFFICE/OUTPT VISIT, EST, LEVL IV, 30-39 MIN: ICD-10-PCS | Mod: 25,,, | Performed by: REHABILITATION UNIT

## 2022-10-10 PROCEDURE — 99214 OFFICE O/P EST MOD 30 MIN: CPT | Mod: 25,,, | Performed by: REHABILITATION UNIT

## 2022-10-10 PROCEDURE — 24500 PR CLOSED RX MID HUMERUS FRACTURE: ICD-10-PCS | Mod: LT,,, | Performed by: REHABILITATION UNIT

## 2022-10-10 RX ORDER — FLECAINIDE ACETATE 50 MG/1
50 TABLET ORAL 2 TIMES DAILY
COMMUNITY
Start: 2022-09-14

## 2022-10-10 RX ORDER — ATORVASTATIN CALCIUM 10 MG/1
10 TABLET, FILM COATED ORAL DAILY
COMMUNITY
Start: 2022-09-14

## 2022-10-10 RX ORDER — METOPROLOL SUCCINATE 100 MG/1
100 TABLET, EXTENDED RELEASE ORAL DAILY
COMMUNITY
Start: 2022-09-18

## 2022-10-10 RX ORDER — RIVAROXABAN 20 MG/1
20 TABLET, FILM COATED ORAL DAILY
COMMUNITY
Start: 2022-09-14

## 2022-10-10 RX ORDER — LOSARTAN POTASSIUM 50 MG/1
50 TABLET ORAL 2 TIMES DAILY
COMMUNITY
Start: 2022-09-14

## 2022-10-10 RX ORDER — DOXAZOSIN 4 MG/1
4 TABLET ORAL DAILY
COMMUNITY
Start: 2022-09-18

## 2022-10-10 NOTE — PROGRESS NOTES
Subjective:      Patient ID: Sumaya Moss is a 82 y.o. female.    Chief Complaint: Injury of the Left Shoulder (Pt had a fall 10/9/22. Went to [facility]. Pt states the pain is a throbbing and aching pain. Pt states she is taking narco for the pain and getting some relief. )    HPI:   Sumaya Moss is a 82 y.o. female who presents today for initial evaluation of her left upper extremity.  She was cleaning and tripped on a cord on 10/09/2022 and fell. No LOC. She takes xarelto. She had acute pain to her left upper extremity.  She also sustained a laceration above her left eyebrow.  She was transported to the emergency department and worked up.  She was found to have a left humeral shaft fracture.  She was placed into a sling and swath and referred for definitive orthopedic care. No numbness/tingling or distal functional deficits. She had a CT of her head, cervical spine, and chest abdomen and pelvis with no acute pathology.  ED notes reviewed. She is accompanied by her . She is in a rolling walker. She also reports some R shoulder pain with reduced ROM today. She reports this started after the fall.     PMH significant for cerebral aneurysms that were surgically managed in 2005 and reported heart ablations.     History reviewed. No pertinent past medical history.  Past Surgical History:   Procedure Laterality Date    ABLATION      HYSTERECTOMY       Social History     Socioeconomic History    Marital status:    Tobacco Use    Smoking status: Never     Passive exposure: Never    Smokeless tobacco: Never   Substance and Sexual Activity    Alcohol use: Never    Drug use: Never   Social History Narrative    ** Merged History Encounter **              Current Outpatient Medications:     atorvastatin (LIPITOR) 10 MG tablet, TAKE ONE TABLET once a day, Disp: , Rfl:     doxazosin (CARDURA) 4 MG tablet, Take 4 mg by mouth once daily., Disp: , Rfl:     flecainide (TAMBOCOR) 50 MG Tab, Take  "by mouth., Disp: , Rfl:     HYDROcodone-acetaminophen (NORCO) 5-325 mg per tablet, Take 1 tablet by mouth every 8 (eight) hours as needed for Pain., Disp: 8 tablet, Rfl: 0    losartan (COZAAR) 50 MG tablet, Take 50 mg by mouth 2 (two) times daily., Disp: , Rfl:     metoprolol succinate (TOPROL-XL) 100 MG 24 hr tablet, TAKE ONE TABLET once a day, Disp: , Rfl:     ondansetron (ZOFRAN-ODT) 4 MG TbDL, Take 1 tablet (4 mg total) by mouth every 8 (eight) hours as needed (nausea vomiting)., Disp: 12 tablet, Rfl: 0    XARELTO 20 mg Tab, Take 20 mg by mouth once daily., Disp: , Rfl:   Review of patient's allergies indicates:  No Known Allergies    Ht 5' 5" (1.651 m)   Wt 81.6 kg (180 lb)   BMI 29.95 kg/m²     Comprehensive review of systems completed and negative except as per HPI.        Objective:   Head: Normocephalic, without obvious abnormality, atraumatic  Eyes: conjunctivae/corneas clear. EOM's intact  Ears: normal external appearance  Nose: Nares normal. Septum midline. Mucosa normal. No drainage  Throat: normal findings: lips normal without lesions  Lungs: unlabored breathing on room air  Chest wall: symmetric chest rise  Heart: regular rate and rhythm  Pulses: 2+ and symmetric  Skin: Skin color, texture, turgor normal. No rashes or lesions  Neurologic: Grossly normal    left SHOULDER    Appearance:   Moderate soft tissue swelling and ecchymosis     Cervical Spine:   No ttp; full, painless ROM; negative Spurlings    Tenderness:   Diffuse about upper arm at known fx site, none distally     AROM:   Deferred     PROM:  Deferred     Pain:  +    Strength:  deferred    Provocative Maneuvers:     Rotator Cuff/Biceps/AC Joint  caitlyn    Pulses: Palpable radial pulse    Neurological deficits: None    The patient has a warm and well-perfused upper extremity with capillary refill less than 2 seconds. Sensation is intact to light touch in terminal nerve distributions. 5/5 ain/pin/uln. The patient has no palpable epitrochlear " lymphadenopathy.    Focused examination of the right upper extremity reveals skin is intact.  She has diffuse tenderness.  Somewhat reduced active motion but she has full passive motion.  She does have some diffuse discomfort.  Overall her strength is intact.  She is neurovascularly intact distally.      Assessment:     Imaging:   X-ray Shoulder 2 or More Views Right  See Provider Notes for results.     IMPRESSION: Please see provider office/clinic notes for radiology   interpretation    This procedure was auto-finalized by: Virtual Radiologist    Two-view radiographs of the left humerus obtained yesterday personally reviewed showing acute displaced midshaft humerus fracture with propagation proximally.  Glenohumeral joints intact.  Visualized elbow joint intact.    Four view radiographs of the right shoulder obtained today personally reviewed showing no acute fractures or dislocations.  Joint spaces are well maintained.  Humeral head remains seated centrally within the glenoid.         1. Closed fracture of shaft of left humerus, unspecified fracture morphology, initial encounter    2. Right shoulder pain, unspecified chronicity          Plan:       Orders Placed This Encounter    ORTHOTIC DEVICE (DME)    X-ray Shoulder 2 or More Views Right      Imaging and exam findings discussed with the patient and her .  She had a fall yesterday and sustained a left humeral shaft fracture.  We discussed operative and non operative options.  We will proceed with non operative management at this time.  She will be referred for fitting with a functional Sun brace.  She was instructed to wear this at all times other than hygiene.  I will see her back in 1 week with repeat radiographs of the left humerus in the brace for fracture care.  Continue pain control.  Radiographs of the right shoulder without fracture or dislocation.  She does have some weakness on exam and may have underlying rotator cuff pathology but will  hold off on further intervention at this time as she is healing from her left upper extremity injury.  All their questions were answered and they were in agreement with the plan.

## 2022-10-17 ENCOUNTER — OFFICE VISIT (OUTPATIENT)
Dept: ORTHOPEDICS | Facility: CLINIC | Age: 82
End: 2022-10-17
Payer: MEDICARE

## 2022-10-17 ENCOUNTER — HOSPITAL ENCOUNTER (OUTPATIENT)
Dept: RADIOLOGY | Facility: CLINIC | Age: 82
Discharge: HOME | End: 2022-10-17
Attending: REHABILITATION UNIT
Payer: MEDICARE

## 2022-10-17 ENCOUNTER — HOSPITAL ENCOUNTER (OUTPATIENT)
Dept: RADIOLOGY | Facility: HOSPITAL | Age: 82
Discharge: HOME | End: 2022-10-17
Attending: REHABILITATION UNIT
Payer: MEDICARE

## 2022-10-17 DIAGNOSIS — M89.8X2 PAIN OF LEFT HUMERUS: ICD-10-CM

## 2022-10-17 DIAGNOSIS — S42.302D CLOSED FRACTURE OF SHAFT OF LEFT HUMERUS WITH ROUTINE HEALING, UNSPECIFIED FRACTURE MORPHOLOGY, SUBSEQUENT ENCOUNTER: Primary | ICD-10-CM

## 2022-10-17 DIAGNOSIS — S42.351A CLOSED DISPLACED COMMINUTED FRACTURE OF SHAFT OF RIGHT HUMERUS, INITIAL ENCOUNTER: ICD-10-CM

## 2022-10-17 DIAGNOSIS — S42.302A CLOSED FRACTURE OF SHAFT OF LEFT HUMERUS, UNSPECIFIED FRACTURE MORPHOLOGY, INITIAL ENCOUNTER: ICD-10-CM

## 2022-10-17 PROCEDURE — 73060 XR HUMERUS 2 VIEW LEFT: ICD-10-PCS | Mod: LT,,, | Performed by: REHABILITATION UNIT

## 2022-10-17 PROCEDURE — 99024 PR POST-OP FOLLOW-UP VISIT: ICD-10-PCS | Mod: ,,, | Performed by: REHABILITATION UNIT

## 2022-10-17 PROCEDURE — 71046 X-RAY EXAM CHEST 2 VIEWS: CPT | Mod: TC

## 2022-10-17 PROCEDURE — 99024 POSTOP FOLLOW-UP VISIT: CPT | Mod: ,,, | Performed by: REHABILITATION UNIT

## 2022-10-17 PROCEDURE — 73060 X-RAY EXAM OF HUMERUS: CPT | Mod: LT,,, | Performed by: REHABILITATION UNIT

## 2022-10-17 NOTE — PROGRESS NOTES
Subjective:      Patient ID: Sumaya Moss is a 82 y.o. female.    Chief Complaint: Pain of the Left Shoulder and Pain (unable to obtain vitals, DOI: 10/9/22, patient had a fall and FX Left humerus is here today to follow up with XR, states her pain is a level 9, finished her pain medications would like some more if possible)    HPI:   Sumaya Moss is a 82 y.o. female who presents today for follow up evaluation of her left upper extremity.  She is accompanied by her .  She was seen 1 week ago.  She was fitted with a Sun brace.  She reports continued pain and sensation of her bones shifting with movement.  She denies any sensory or motor changes to into the hand.  She has been sleeping in a recliner/lift chair as this is the only way she can get comfortable.  She reports difficulty with the brace and does not feel comfortable with her  assisting her with adjusting it.  No reported skin issues at this time.    Initial HPI:  She was cleaning and tripped on a cord on 10/09/2022 and fell. No LOC. She takes xarelto. She had acute pain to her left upper extremity.  She also sustained a laceration above her left eyebrow.  She was transported to the emergency department and worked up.  She was found to have a left humeral shaft fracture.  She was placed into a sling and swath and referred for definitive orthopedic care. No numbness/tingling or distal functional deficits. She had a CT of her head, cervical spine, and chest abdomen and pelvis with no acute pathology.  ED notes reviewed. She is accompanied by her . She is in a rolling walker. She also reports some R shoulder pain with reduced ROM today. She reports this started after the fall.     PMH significant for cerebral aneurysms that were surgically managed in 2005 and reported heart ablations.     Past Medical History:   Diagnosis Date    Fractures     Respiratory distress      Past Surgical History:   Procedure Laterality  Date    ABLATION      HYSTERECTOMY       Social History     Socioeconomic History    Marital status:    Tobacco Use    Smoking status: Never     Passive exposure: Never    Smokeless tobacco: Never   Substance and Sexual Activity    Alcohol use: Never    Drug use: Never    Sexual activity: Not Currently   Social History Narrative    ** Merged History Encounter **              Current Outpatient Medications:     atorvastatin (LIPITOR) 10 MG tablet, TAKE ONE TABLET once a day, Disp: , Rfl:     doxazosin (CARDURA) 4 MG tablet, Take 4 mg by mouth once daily., Disp: , Rfl:     flecainide (TAMBOCOR) 50 MG Tab, Take by mouth., Disp: , Rfl:     HYDROcodone-acetaminophen (NORCO) 5-325 mg per tablet, Take 1 tablet by mouth every 8 (eight) hours as needed for Pain., Disp: 8 tablet, Rfl: 0    losartan (COZAAR) 50 MG tablet, Take 50 mg by mouth 2 (two) times daily., Disp: , Rfl:     metoprolol succinate (TOPROL-XL) 100 MG 24 hr tablet, TAKE ONE TABLET once a day, Disp: , Rfl:     ondansetron (ZOFRAN-ODT) 4 MG TbDL, Take 1 tablet (4 mg total) by mouth every 8 (eight) hours as needed (nausea vomiting)., Disp: 12 tablet, Rfl: 0    XARELTO 20 mg Tab, Take 20 mg by mouth once daily., Disp: , Rfl:   Review of patient's allergies indicates:  No Known Allergies    There were no vitals taken for this visit.    Comprehensive review of systems completed and negative except as per HPI.        Objective:   Head: Normocephalic, ecchymosis about left orbit  Eyes: conjunctivae/corneas clear. EOM's intact  Ears: normal external appearance  Nose: Nares normal. Septum midline. Mucosa normal. No drainage  Throat: normal findings: lips normal without lesions  Lungs: unlabored breathing on room air  Chest wall: symmetric chest rise  Heart: regular rate and rhythm  Pulses: 2+ and symmetric  Skin: Skin color, texture, turgor normal. No rashes or lesions  Neurologic: Grossly normal    left SHOULDER    Appearance:   Moderate soft tissue swelling  and ecchymosis     Cervical Spine:   No ttp; full, painless ROM; negative Spurlings    Tenderness:   Diffuse about upper arm at known fx site, none distally     AROM:   Deferred     PROM:  Deferred     Pain:  +    Strength:  deferred    Provocative Maneuvers:     Rotator Cuff/Biceps/AC Joint  caitlyn    Pulses: Palpable radial pulse    Neurological deficits: None    The patient has a warm and well-perfused upper extremity with capillary refill less than 2 seconds. Sensation is intact to light touch in terminal nerve distributions. 5/5 ain/pin/uln. The patient has no palpable epitrochlear lymphadenopathy.    Assessment:     Imaging:   Two-view radiographs of the left humerus obtained today personally reviewed showing midshaft humerus fracture aligned well with overlying functional brace.  Glenohumeral joints intact.  Visualized elbow joint intact.      1. Closed fracture of shaft of left humerus with routine healing, unspecified fracture morphology, subsequent encounter    2. Pain of left humerus          Plan:       Orders Placed This Encounter    X-Ray Humerus 2 View Left    X-Ray Chest PA And Lateral    CBC auto differential    Comprehensive metabolic panel    EKG 12-lead    Case Request Operating Room: INSERTION, INTRAMEDULLARY LEATHA, HUMERUS      Imaging and exam findings discussed with the patient and her .  She is 1 week out from her fall resulting in a left humeral shaft fracture.  She was fitted for a functional brace.  Radiographs within the functional brace show overall the alignment is acceptable.  She does report that she is having difficulties with the functional brace and is unable to tolerate and care for herself.  She has continued pain and reduced function.  We again discussed operative and non operative management.  We discussed surgical intervention.  This will allow her to weightbear earlier and stabilized fracture fragments.  She will also not need that functional brace following surgery.  She  has failed nonoperative measures and is interested in proceeding. The patient had an opportunity to ask questions. All questions were answered. The risks and benefits of surgery were discussed with the patient, including but not limited to bleeding, infection, damage to surrounding nerves and structures, shoulder pain and dysfunction, need for further surgery, nonunion, malunion, scar, anesthesia risk, progression of arthritis, blood clots, and medical risks of surgery. The patient voiced understanding of the risks and benefits and provided written consent to the procedure. Plan for intramedullary treatment of left humeral fracture with possible open reduction internal fixation and any indicated procedures. Patient will be scheduled for surgery at a mutually convenient date in the near future.  Preoperative lab work today.  I discussed that she will be in a sling postoperatively.

## 2022-10-22 ENCOUNTER — PATIENT MESSAGE (OUTPATIENT)
Dept: ADMINISTRATIVE | Facility: OTHER | Age: 82
End: 2022-10-22
Payer: MEDICARE

## 2022-10-23 ENCOUNTER — PATIENT MESSAGE (OUTPATIENT)
Dept: ADMINISTRATIVE | Facility: OTHER | Age: 82
End: 2022-10-23
Payer: MEDICARE

## 2022-10-24 ENCOUNTER — ANESTHESIA EVENT (OUTPATIENT)
Dept: SURGERY | Facility: HOSPITAL | Age: 82
End: 2022-10-24
Payer: MEDICARE

## 2022-10-24 ENCOUNTER — TELEPHONE (OUTPATIENT)
Dept: PREADMISSION TESTING | Facility: HOSPITAL | Age: 82
End: 2022-10-24

## 2022-10-25 ENCOUNTER — ANESTHESIA (OUTPATIENT)
Dept: SURGERY | Facility: HOSPITAL | Age: 82
End: 2022-10-25
Payer: MEDICARE

## 2022-10-25 DIAGNOSIS — S42.352A CLOSED DISPLACED COMMINUTED FRACTURE OF SHAFT OF LEFT HUMERUS, INITIAL ENCOUNTER: Primary | ICD-10-CM

## 2022-10-26 ENCOUNTER — HOSPITAL ENCOUNTER (OUTPATIENT)
Facility: HOSPITAL | Age: 82
Discharge: HOME | End: 2022-10-26
Attending: ORTHOPAEDIC SURGERY | Admitting: ORTHOPAEDIC SURGERY
Payer: MEDICARE

## 2022-10-26 DIAGNOSIS — S42.351A CLOSED DISPLACED COMMINUTED FRACTURE OF SHAFT OF RIGHT HUMERUS, INITIAL ENCOUNTER: Primary | ICD-10-CM

## 2022-10-26 PROBLEM — S42.301A FRACTURE OF HUMERAL SHAFT, RIGHT, CLOSED: Status: ACTIVE | Noted: 2022-10-26

## 2022-10-26 PROCEDURE — 76942 ECHO GUIDE FOR BIOPSY: CPT | Performed by: ANESTHESIOLOGY

## 2022-10-26 PROCEDURE — 71000015 HC POSTOP RECOV 1ST HR: Performed by: ORTHOPAEDIC SURGERY

## 2022-10-26 PROCEDURE — 36000710: Performed by: ORTHOPAEDIC SURGERY

## 2022-10-26 PROCEDURE — 27201423 OPTIME MED/SURG SUP & DEVICES STERILE SUPPLY: Performed by: ORTHOPAEDIC SURGERY

## 2022-10-26 PROCEDURE — 25000003 PHARM REV CODE 250: Performed by: ANESTHESIOLOGY

## 2022-10-26 PROCEDURE — 24516 PR OPEN ROD FIXATN HUMERAL SHAFT FX: ICD-10-PCS | Mod: 58,AS,LT, | Performed by: PHYSICIAN ASSISTANT

## 2022-10-26 PROCEDURE — 36000711: Performed by: ORTHOPAEDIC SURGERY

## 2022-10-26 PROCEDURE — 37000009 HC ANESTHESIA EA ADD 15 MINS: Performed by: ORTHOPAEDIC SURGERY

## 2022-10-26 PROCEDURE — C1769 GUIDE WIRE: HCPCS | Performed by: ORTHOPAEDIC SURGERY

## 2022-10-26 PROCEDURE — C1713 ANCHOR/SCREW BN/BN,TIS/BN: HCPCS | Performed by: ORTHOPAEDIC SURGERY

## 2022-10-26 PROCEDURE — 24516 TX HUMRL SHAFT FX IMED IMPLT: CPT | Mod: 58,LT,, | Performed by: ORTHOPAEDIC SURGERY

## 2022-10-26 PROCEDURE — 63600175 PHARM REV CODE 636 W HCPCS: Performed by: ORTHOPAEDIC SURGERY

## 2022-10-26 PROCEDURE — 71000016 HC POSTOP RECOV ADDL HR: Performed by: ORTHOPAEDIC SURGERY

## 2022-10-26 PROCEDURE — 63600175 PHARM REV CODE 636 W HCPCS: Performed by: ANESTHESIOLOGY

## 2022-10-26 PROCEDURE — 24516 TX HUMRL SHAFT FX IMED IMPLT: CPT | Mod: 58,AS,LT, | Performed by: PHYSICIAN ASSISTANT

## 2022-10-26 PROCEDURE — 27800903 OPTIME MED/SURG SUP & DEVICES OTHER IMPLANTS: Performed by: ORTHOPAEDIC SURGERY

## 2022-10-26 PROCEDURE — 71000033 HC RECOVERY, INTIAL HOUR: Performed by: ORTHOPAEDIC SURGERY

## 2022-10-26 PROCEDURE — 37000008 HC ANESTHESIA 1ST 15 MINUTES: Performed by: ORTHOPAEDIC SURGERY

## 2022-10-26 PROCEDURE — C1776 JOINT DEVICE (IMPLANTABLE): HCPCS | Performed by: ORTHOPAEDIC SURGERY

## 2022-10-26 PROCEDURE — 24516 PR OPEN ROD FIXATN HUMERAL SHAFT FX: ICD-10-PCS | Mod: 58,LT,, | Performed by: ORTHOPAEDIC SURGERY

## 2022-10-26 PROCEDURE — 63600175 PHARM REV CODE 636 W HCPCS

## 2022-10-26 RX ORDER — VANCOMYCIN HYDROCHLORIDE 1 G/20ML
INJECTION, POWDER, LYOPHILIZED, FOR SOLUTION INTRAVENOUS
Status: DISCONTINUED | OUTPATIENT
Start: 2022-10-26 | End: 2022-10-26 | Stop reason: HOSPADM

## 2022-10-26 RX ORDER — HYDROCODONE BITARTRATE AND ACETAMINOPHEN 10; 325 MG/1; MG/1
1 TABLET ORAL EVERY 4 HOURS PRN
Status: DISCONTINUED | OUTPATIENT
Start: 2022-10-26 | End: 2022-10-26 | Stop reason: HOSPADM

## 2022-10-26 RX ORDER — FENTANYL CITRATE 50 UG/ML
INJECTION, SOLUTION INTRAMUSCULAR; INTRAVENOUS
Status: DISCONTINUED | OUTPATIENT
Start: 2022-10-26 | End: 2022-10-26

## 2022-10-26 RX ORDER — EPHEDRINE SULFATE 50 MG/ML
INJECTION, SOLUTION INTRAVENOUS
Status: DISCONTINUED | OUTPATIENT
Start: 2022-10-26 | End: 2022-10-26

## 2022-10-26 RX ORDER — HYDROCODONE BITARTRATE AND ACETAMINOPHEN 5; 325 MG/1; MG/1
1 TABLET ORAL ONCE
Status: COMPLETED | OUTPATIENT
Start: 2022-10-26 | End: 2022-10-26

## 2022-10-26 RX ORDER — HYDROCODONE BITARTRATE AND ACETAMINOPHEN 5; 325 MG/1; MG/1
TABLET ORAL
Status: DISCONTINUED
Start: 2022-10-26 | End: 2022-10-26 | Stop reason: HOSPADM

## 2022-10-26 RX ORDER — MIDAZOLAM HYDROCHLORIDE 1 MG/ML
INJECTION INTRAMUSCULAR; INTRAVENOUS
Status: COMPLETED
Start: 2022-10-26 | End: 2022-10-26

## 2022-10-26 RX ORDER — METHOCARBAMOL 750 MG/1
750 TABLET, FILM COATED ORAL 3 TIMES DAILY
Qty: 30 TABLET | Refills: 0 | Status: SHIPPED | OUTPATIENT
Start: 2022-10-26 | End: 2022-11-05

## 2022-10-26 RX ORDER — ACETAMINOPHEN 10 MG/ML
INJECTION, SOLUTION INTRAVENOUS
Status: DISCONTINUED | OUTPATIENT
Start: 2022-10-26 | End: 2022-10-26

## 2022-10-26 RX ORDER — PROPOFOL 10 MG/ML
VIAL (ML) INTRAVENOUS
Status: DISCONTINUED | OUTPATIENT
Start: 2022-10-26 | End: 2022-10-26

## 2022-10-26 RX ORDER — ROCURONIUM BROMIDE 10 MG/ML
INJECTION, SOLUTION INTRAVENOUS
Status: DISCONTINUED | OUTPATIENT
Start: 2022-10-26 | End: 2022-10-26

## 2022-10-26 RX ORDER — CEFAZOLIN SODIUM 2 G/50ML
2 SOLUTION INTRAVENOUS
Status: DISCONTINUED | OUTPATIENT
Start: 2022-10-26 | End: 2022-10-26 | Stop reason: HOSPADM

## 2022-10-26 RX ORDER — DEXAMETHASONE SODIUM PHOSPHATE 4 MG/ML
INJECTION, SOLUTION INTRA-ARTICULAR; INTRALESIONAL; INTRAMUSCULAR; INTRAVENOUS; SOFT TISSUE
Status: DISCONTINUED | OUTPATIENT
Start: 2022-10-26 | End: 2022-10-26

## 2022-10-26 RX ORDER — MORPHINE SULFATE 10 MG/ML
4 INJECTION INTRAMUSCULAR; INTRAVENOUS; SUBCUTANEOUS EVERY 6 HOURS PRN
Status: DISCONTINUED | OUTPATIENT
Start: 2022-10-26 | End: 2022-10-26 | Stop reason: HOSPADM

## 2022-10-26 RX ORDER — METHOCARBAMOL 100 MG/ML
INJECTION, SOLUTION INTRAMUSCULAR; INTRAVENOUS
Status: COMPLETED
Start: 2022-10-26 | End: 2022-10-26

## 2022-10-26 RX ORDER — ONDANSETRON HYDROCHLORIDE 2 MG/ML
INJECTION, SOLUTION INTRAMUSCULAR; INTRAVENOUS
Status: DISCONTINUED | OUTPATIENT
Start: 2022-10-26 | End: 2022-10-26

## 2022-10-26 RX ORDER — ROPIVACAINE HYDROCHLORIDE 5 MG/ML
INJECTION, SOLUTION EPIDURAL; INFILTRATION; PERINEURAL
Status: COMPLETED
Start: 2022-10-26 | End: 2022-10-26

## 2022-10-26 RX ORDER — METHOCARBAMOL 750 MG/1
750 TABLET, FILM COATED ORAL 3 TIMES DAILY
Status: DISCONTINUED | OUTPATIENT
Start: 2022-10-26 | End: 2022-10-26 | Stop reason: HOSPADM

## 2022-10-26 RX ORDER — ONDANSETRON 2 MG/ML
4 INJECTION INTRAMUSCULAR; INTRAVENOUS EVERY 6 HOURS PRN
Status: DISCONTINUED | OUTPATIENT
Start: 2022-10-26 | End: 2022-10-26 | Stop reason: HOSPADM

## 2022-10-26 RX ORDER — HYDROCODONE BITARTRATE AND ACETAMINOPHEN 5; 325 MG/1; MG/1
1 TABLET ORAL EVERY 4 HOURS PRN
Status: DISCONTINUED | OUTPATIENT
Start: 2022-10-26 | End: 2022-10-26 | Stop reason: HOSPADM

## 2022-10-26 RX ORDER — ROPIVACAINE HYDROCHLORIDE 5 MG/ML
INJECTION, SOLUTION EPIDURAL; INFILTRATION; PERINEURAL
Status: COMPLETED | OUTPATIENT
Start: 2022-10-26 | End: 2022-10-26

## 2022-10-26 RX ORDER — HYDROCODONE BITARTRATE AND ACETAMINOPHEN 10; 325 MG/1; MG/1
1 TABLET ORAL EVERY 4 HOURS PRN
Qty: 42 TABLET | Refills: 0 | Status: SHIPPED | OUTPATIENT
Start: 2022-10-26 | End: 2022-11-16 | Stop reason: SDUPTHER

## 2022-10-26 RX ADMIN — METHOCARBAMOL 1000 MG: 100 INJECTION, SOLUTION INTRAMUSCULAR; INTRAVENOUS at 02:10

## 2022-10-26 RX ADMIN — FENTANYL CITRATE 50 MCG: 50 INJECTION, SOLUTION INTRAMUSCULAR; INTRAVENOUS at 01:10

## 2022-10-26 RX ADMIN — CEFAZOLIN SODIUM 2 G: 2 SOLUTION INTRAVENOUS at 01:10

## 2022-10-26 RX ADMIN — HYDROCODONE BITARTRATE AND ACETAMINOPHEN 1 TABLET: 5; 325 TABLET ORAL at 09:10

## 2022-10-26 RX ADMIN — SODIUM CHLORIDE, SODIUM GLUCONATE, SODIUM ACETATE, POTASSIUM CHLORIDE AND MAGNESIUM CHLORIDE: 526; 502; 368; 37; 30 INJECTION, SOLUTION INTRAVENOUS at 12:10

## 2022-10-26 RX ADMIN — PROPOFOL 100 MG: 10 INJECTION, EMULSION INTRAVENOUS at 01:10

## 2022-10-26 RX ADMIN — ROCURONIUM BROMIDE 50 MG: 50 INJECTION INTRAVENOUS at 01:10

## 2022-10-26 RX ADMIN — EPHEDRINE SULFATE 5 MG: 50 INJECTION INTRAVENOUS at 01:10

## 2022-10-26 RX ADMIN — SUGAMMADEX 150 MG: 100 INJECTION, SOLUTION INTRAVENOUS at 01:10

## 2022-10-26 RX ADMIN — DEXAMETHASONE SODIUM PHOSPHATE 4 MG: 4 INJECTION, SOLUTION INTRA-ARTICULAR; INTRALESIONAL; INTRAMUSCULAR; INTRAVENOUS; SOFT TISSUE at 01:10

## 2022-10-26 RX ADMIN — MIDAZOLAM HYDROCHLORIDE 1 MG: 1 INJECTION, SOLUTION INTRAMUSCULAR; INTRAVENOUS at 12:10

## 2022-10-26 RX ADMIN — ROPIVACAINE HYDROCHLORIDE 30 ML: 5 INJECTION, SOLUTION EPIDURAL; INFILTRATION; PERINEURAL at 12:10

## 2022-10-26 RX ADMIN — ONDANSETRON 4 MG: 2 INJECTION INTRAMUSCULAR; INTRAVENOUS at 01:10

## 2022-10-26 RX ADMIN — ACETAMINOPHEN 1000 MG: 10 INJECTION, SOLUTION INTRAVENOUS at 01:10

## 2022-10-26 NOTE — DISCHARGE SUMMARY
[facility] - Periop Services  Discharge Note  Short Stay    Procedure(s) (LRB):  INSERTION, INTRAMEDULLARY LEATHA, HUMERUS (Left)      OUTCOME: Patient tolerated treatment/procedure well without complication and is now ready for discharge.    DISPOSITION: Home or Self Care    FINAL DIAGNOSIS:  <principal problem not specified>    FOLLOWUP: In clinic in 3 weeks for wound check and repeat x-rays    DISCHARGE INSTRUCTIONS:    Discharge Procedure Orders   Remove dressing in 48 hours   Order Comments: Replace with large band-aid over incisions. May leave open to air if no drainage.     Weight bearing restrictions (specify):   Order Comments: NWB LUE, ROMAT with limited overhead due to Rotator cuff repair     Shower on day dressing removed (No bath)   Order Comments: May begin showers on post op day 7 with antibacterial soap and water. Do not submerge in bathtub        TIME SPENT ON DISCHARGE: 15 minutes

## 2022-10-26 NOTE — H&P
[facility]  Orthopedic Trauma  History and Physical    Patient Name: Sumaya Moss  MRN: [id number]  Admission Date: (Not on file)  Hospital Length of Stay: 0 days  Attending Provider: No att. providers found  Primary Care Provider: JAMISON MARQUEZ MD        Chief Complaint:   Chief Complaint   Patient presents with    Left Shoulder - Pain    Pain     unable to obtain vitals, DOI: 10/9/22, patient had a fall and FX Left humerus is here today to follow up with XR, states her pain is a level 9, finished her pain medications would like some more if possible        HPI: She was cleaning and tripped on a cord on 10/09/2022 and fell. No LOC. She takes xarelto. She had acute pain to her left upper extremity.  She also sustained a laceration above her left eyebrow.  She was transported to the emergency department and worked up.  She was found to have a left humeral shaft fracture.  She was placed into a sling and swath and referred for definitive orthopedic care. No numbness/tingling or distal functional deficits. She had a CT of her head, cervical spine, and chest abdomen and pelvis with no acute pathology.  ED notes reviewed. She is accompanied by her . She is in a rolling walker. She also reports some R shoulder pain with reduced ROM today. She reports this started after the fall.        Patient was originally seen by my partner Dr. Burkett.  I have asked to take over care due to earlier OR availability.  She has significantly comminuted right humeral shaft fracture needing surgical fixation.  She denies numbness tingling.    Past Medical History:   Diagnosis Date    Coronary artery disease     Fractures     Hypercholesterolemia     Hypertension     Respiratory distress        Past Surgical History:   Procedure Laterality Date    ABLATION      BRAIN SURGERY      aneurysm    COLONOSCOPY      HYSTERECTOMY         Review of patient's allergies indicates:  No Known Allergies    Current Outpatient  Medications   Medication Sig    atorvastatin (LIPITOR) 10 MG tablet Take 10 mg by mouth once daily.    doxazosin (CARDURA) 4 MG tablet Take 4 mg by mouth once daily.    flecainide (TAMBOCOR) 50 MG Tab Take 50 mg by mouth 2 (two) times a day.    HYDROcodone-acetaminophen (NORCO) 5-325 mg per tablet Take 1 tablet by mouth every 8 (eight) hours as needed for Pain.    losartan (COZAAR) 50 MG tablet Take 50 mg by mouth 2 (two) times a day.    metoprolol succinate (TOPROL-XL) 100 MG 24 hr tablet 100 mg once daily.    ondansetron (ZOFRAN-ODT) 4 MG TbDL Take 1 tablet (4 mg total) by mouth every 8 (eight) hours as needed (nausea vomiting).    XARELTO 20 mg Tab Take 20 mg by mouth once daily.    vitamin D (VITAMIN D3) 1000 units Tab Take 1,000 Units by mouth once daily.     No current facility-administered medications for this visit.     Family History       Problem Relation (Age of Onset)    No Known Problems Mother, Father          Tobacco Use    Smoking status: Never     Passive exposure: Never    Smokeless tobacco: Never   Substance and Sexual Activity    Alcohol use: Never    Drug use: Never    Sexual activity: Yes       ROS:  Constitutional: Denies fever chills  Eyes: No change in vision  ENT: No ringing or current infections  CV: No chest pain  Resp: No labored breathing  MSK: Pain evident at site of injury located in HPI,   Integ: No signs of abrasions or lacerations  Neuro: No numbness or tingling  Lymphatic: No swelling outside the area of injury   Objective:     Vital Signs (Most Recent):    Vital Signs (24h Range):  [unfilled]           There is no height or weight on file to calculate BMI.    [unfilled]    Ortho/SPM Exam  General the patient is alert and oriented x3 no acute distress nontoxic-appearing appropriate affect.    Constitutional: Vital signs are examined and stable.  Resp: No signs of labored breathing                RUE: -Skin: Dressing CDI, No signs of new abrasions or lacerations, no scars            -MSK: STR 5/5 AIN/PIN/Median/Radial/Ulnar motor,           -Neuro:  Sensation intact to light touch C5-T1 dermatomes           -Lymphatic: No signs of  lymphadenopathy,            -CV:  Capillary refill is less than 2 seconds. Radial and ulnar pulses 2/4. Compartments soft and compressible      Significant Labs:   Recent Lab Results       None          All pertinent labs within the past 24 hours have been reviewed.  Recent Lab Results       None             Significant Imaging: I have reviewed all pertinent imaging results/findings.  X-Ray Chest 1 View    Result Date: 10/9/2022  EXAMINATION: XR CHEST 1 VIEW CLINICAL HISTORY: r/o bleeding or hemorrhage; TECHNIQUE: Single frontal view of the chest was performed. COMPARISON: None FINDINGS: The lungs are clear, with normal appearance of pulmonary vasculature and no pleural effusion or pneumothorax. The cardiac silhouette is normal in size. The hilar and mediastinal contours are unremarkable. Bones are intact.     No acute abnormality. Electronically signed by: Peter Mitchell MD Date:    10/09/2022 Time:    13:55    X-Ray Chest PA And Lateral    Result Date: 10/17/2022  EXAMINATION: XR CHEST PA AND LATERAL CLINICAL HISTORY: , Other specified disorders of bone, upper arm. COMPARISON: October 9, 2022 FINDINGS: No alveolar consolidation, effusion, or pneumothorax is seen.   The thoracic aorta is normal  cardiac silhouette, central pulmonary vessels and mediastinum are normal in size and are grossly unremarkable. Some pleural apical capping identified in both apices There are some changes suggestive of a hiatus hernia     No acute chest disease is identified.. Apical pleural capping in both apices. Hiatus hernia Electronically signed by: Vivek Wise Date:    10/17/2022 Time:    12:09    X-ray Shoulder 2 or More Views Right    Result Date: 10/10/2022  See Provider Notes for results. IMPRESSION: Please see provider office/clinic notes for radiology interpretation This  procedure was auto-finalized by: Virtual Radiologist    X-Ray Humerus 2 View Left    Result Date: 10/17/2022  See Provider Notes for results. IMPRESSION: Please see provider office/clinic notes for radiology interpretation This procedure was auto-finalized by: Virtual Radiologist    X-Ray Humerus 2 View Left    Result Date: 10/9/2022  EXAMINATION: XR HUMERUS 2 VIEW LEFT CLINICAL HISTORY: Unspecified fall, initial encounter TECHNIQUE: Two views of the left humerus were obtained. COMPARISON: None. FINDINGS: There is a comminuted fracture of the proximal to mid diaphysis of the humerus with 30 degrees posterolateral apex angulation.    Grossly normal glenohumeral alignment.  No orthogonal view of the shoulder provided. Regional soft tissues are normal.     Angulated fracture of the humeral diaphysis. Electronically signed by: Meme Flynn Date:    10/09/2022 Time:    13:59    CT Head Without Contrast    Result Date: 10/9/2022  EXAMINATION: CT HEAD WITHOUT CONTRAST CLINICAL HISTORY: Trauma; TECHNIQUE: Low dose axial CT images obtained throughout the head without intravenous contrast.  Axial, sagittal and coronal reconstructions were performed and interpreted. DLP: 1136 mGycm All CT scans at this location are performed using dose optimization techniques as appropriate to a performed exam including the following automated exposure control, adjustment of the mA and/or kV according to patient size and/or use of iterative reconstruction technique COMPARISON: No relevant prior available for comparison. FINDINGS: BRAIN: There are post treatment changes of prior aneurysm clipping and coiling the level of the [id number].  There is associated beam hardening artifact which obscures evaluation.  Gray white differentiation is grossly maintained.  Minimal periventricular and subcortical white matter changes most compatible with chronic small vessel ischemic disease.  No appreciable hemorrhage.  No edema. No mass effect or  midline shift.  The posterior fossa and midline structures are unremarkable. VENTRICLES: Normal in size and configuration. EXTRA-AXIAL: No abnormal extra-axial collections. 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 SINUSES AND MASTOIDS: Mucoperiosteal thickening at the right maxillary sinus.  Patchy opacification of the ethmoid air cells. OTHER: Left supraorbital hematoma.     No appreciable acute intracranial abnormality. 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  There is associated beam hardening artifact which degrades evaluation. Electronically signed by: Meme Flynn Date:    10/09/2022 Time:    13:49    CT Cervical Spine Without Contrast    Result Date: 10/9/2022  EXAMINATION: CT CERVICAL SPINE WITHOUT CONTRAST CLINICAL HISTORY: Trauma; TECHNIQUE: Low dose helical acquired images with axial, sagittal and coronal reformations though the cervical spine.  Contrast was not administered. All CT scans at this location are performed using dose optimization techniques as appropriate to a performed exam including the following automated exposure control, adjustment of the mA and/or kV according to patient size and/or use of iterative reconstruction technique DLP: 541 mGycm COMPARISON: No relevant prior available for comparison. FINDINGS: BONES: No fracture.  Trace anterolisthesis of C7 on T1. The dens is intact, the lateral masses of C1 are normally aligned, and the atlantodental interval is normal. Multilevel left facet arthropathy.  Facet and uncovertebral hypertrophy contribute to neural foraminal stenosis at C5-C6 and C6-C7 on the left. DISCS: Multilevel disc space narrowing, severe at C5-C6 and C6-C7. SPINAL CANAL: No osseous narrowing of the spinal canal. SOFT TISSUES: Regional soft tissues are unremarkable. LUNG APICES: Clear OTHER: There are cervical and cavernous carotid artery calcifications.     No appreciable acute fracture.  Moderate spondylosis. Electronically signed by: Meme Flynn Date:    10/09/2022 Time:    13:53    X-Ray Pelvis Routine AP    Result Date: 10/9/2022  EXAMINATION: XR PELVIS ROUTINE AP CLINICAL HISTORY: r/o bleeding or hemorrhage; TECHNIQUE: AP view of the pelvis was performed. COMPARISON: None. FINDINGS: No appreciable fracture. No dislocation. Regional soft tissues are unremarkable.     No acute osseous abnormality. Electronically signed by: Meme Flynn Date:    10/09/2022 Time:    13:57    CT Chest Abdomen Pelvis With Contrast (xpd)    Result Date: 10/9/2022  EXAMINATION: CT CHEST ABDOMEN PELVIS WITH CONTRAST (XPD) CLINICAL HISTORY: Trauma; TECHNIQUE: Multiple cross-sectional images of the chest, abdomen and pelvis were obtained after the intravenous administration of contrast.  Coronal and sagittal reformatted images obtained.  An automated dose exposure technique was utilized this limits radiation does the patient. COMPARISON: None FINDINGS: Chest: Heart size within normal limits with coronary artery calcifications.  The course and caliber of the thoracic aorta is normal with a triple vessel aortic arch.  No evidence for aortic injury or mediastinal hematoma.  The main pulmonary artery is of normal caliber.  No evidence for hilar or mediastinal adenopathy. Dependent atelectatic changes lungs.  No evidence for consolidation.  No pulmonary contusion, laceration, or pneumothorax.  No pleural thickening or pleural effusion.  The trachea and airway are patent. Abdomen/pelvis: Nonspecific hypodensities identified throughout the liver.  These are nonspecific and statistically represent simple cyst.  Gallbladder is present.  The spleen, adrenals, and pancreas are normal.  Bosniak type 1 cysts are identified with peripelvic cyst. Small hiatal hernia.  Small bowel is of normal caliber.  Colon is of normal caliber.  The appendix is not identified. The uterus is surgically absent.  No evidence for adnexal mass.   The bladder is under distended with pelvic floor lacks a shin.  No evidence for aortic injury.  The course and caliber of the abdominal aorta is normal with scattered calcified atheromatous disease.  No free fluid in the abdomen pelvis.  No evidence for adenopathy. No suspicious osseous lesions.  Spondylotic changes are identified.  The soft tissues are normal.     No sequela of trauma involving the chest, abdomen, pelvis.  Other secondary findings as above. Electronically signed by: Peter Mitchell MD Date:    10/09/2022 Time:    13:53       Assessment/Plan:     There are no hospital problems to display for this patient.    Patient has a right comminuted humeral shaft fracture.  We have offered non operative management with patient has failed due to significant pain and lack of mobilization.  We have offered her surgical stabilization of the fracture to allow it decrease pain and increase range of motion.  Patient understands risks of this procedure.    I explained that surgery and the nature of their condition are not without risks. These include, but are not limited to, bleeding, infection, neurovascular compromise, malunion, nonunion, hardware complications, wound complications, scarring, cosmetic defects, need for later and/or repeated surgeries, pain, loss of ROM, loss of function, PTOA, deformity, stance/gait and/or functional abnormalities, thromboembolic complications, compartment syndrome, loss of limb, loss of life, anesthetic complications, and other imponderables. I explained that these can occur despite the adequacy of treatments rendered, and that their risks are heightened given the nature of their condition. They verbalized understanding. They would like to continue with surgery at this time. If appropriate family was involved with surgical discussion.           The patient has been prescribed an opioid analgesic that exceeds the limits set forth in the opioid prescribing rules for acute pain. Treatment  with non-opioid medications is not a suitable alternative given the patient's condition. The patient meets exception criteria for: exceeding the 5 or 7 day rule when prescribing narcotic medications because: he/she suffers from a injury that requires more than 5 or 7 day duration because the patient is continuing to recover. This is the minimum duration consistent with the patient's medical condition.  The prescribed dose and/or duration is the level necessary to therapeutically treat the patient's pain. The patient was advised of the risks and benefits of opioid medications, including the potential for addiction.    This note/OR report was created with the assistance of  voice recognition software or phone  dictation.  There may be transcription errors as a result of using this technology however minimal. Effort has been made to assure accuracy of transcription but any obvious errors or omissions should be clarified with the author of the document.       Doc Scherer DO   Orthopedic Trauma Surgery   [facility]

## 2022-10-26 NOTE — ANESTHESIA RELEASE NOTE
"Anesthesia Release from PACU Note    Patient: Sumaya Moss    Procedure(s) Performed: Procedure(s) (LRB):  INSERTION, INTRAMEDULLARY LEATHA, HUMERUS (Left)    Anesthesia type: general    Post pain: Adequate analgesia    Post assessment: no apparent anesthetic complications    Last Vitals:   Visit Vitals  BP (!) 144/72   Pulse 60   Temp 36 °C (96.8 °F) (Skin)   Resp 16   Ht 5' 5" (1.651 m)   Wt 81.6 kg (180 lb)   SpO2 95%   Breastfeeding No   BMI 29.95 kg/m²       Post vital signs: stable    Level of consciousness: awake, alert  and oriented    Nausea/Vomiting: no nausea/no vomiting    Complications: none    Airway Patency: patent    Respiratory: unassisted, spontaneous ventilation, room air    Cardiovascular: stable and blood pressure at baseline    Hydration: euvolemic  "

## 2022-10-26 NOTE — OP NOTE
OPERATIVE REPORT    Patient: Sumaya Moss   : 1940    MRN: [id number]  Date: 10/26/2022      Surgeon:Doc Scherer DO  Assistant: Minor Wall was essential, part of the procedure including deep hardware placement and deep closure.  No senior assistant was availible  Preoperative Diagnosis:Left humeral shaft fracture  Postoperative Diagnosis: Same  Procedure:    1) Treatment of left humeral shaft fracture with intramedullary implant - [id number]  Anesthesiologist: Jorge Meneses MD  OR Staff: Circulator: Debbie Fry RN; Sp Kelly, EVENS; Gilda Belcher RN  Physician Assistant: Norma Wall PA-C  Scrub Person: ST María  Implants:   Implant Name Type Inv. Item Serial No.  Lot No. LRB No. Used Action   Affixus Natural Nail Humeruds Ball Nose Guidwire, Sterile Stainless Steel     [employer] [id number] Left 1 Implanted and Explanted   Magalie Proximal Humerus, Left Long    [employer] [id number] Left 1 Implanted   MAGALIE, Blunt Tip Screw    [employer] [id number] Left 1 Implanted   MAGALIE Cortical Bone Screw     [employer] [id number] Left 1 Implanted   MAGALIE Cortical Bone SCrew     [employer] [id number] Left 1 Implanted   MAGALIE Cortical Bone SCrew     [employer] [id number] Left 1 Implanted   2.5mm Coring Pin    [employer]ET  Left 1 Implanted and Explanted   11.0mm Coring Reamer     [employer]  Left 1 Implanted and Explanted     EBL: 100cc  Complications: None  Disposition: To PACU, stable    Indications: Sumaya Moss is a 82 y.o. female presenting with the aforementioned injuries/findings. The procedure is indicated to best obtain and maintain stability and alignment about the humerus.  The patient is awake and alert. After thorough discussion of the risks, benefits, expected outcomes, and alternatives to surgical intervention (including nonoperative management), the patient agreed to proceed with surgical treatment.  Specific risks discussed included, but were not  limited to: superficial or deep infection, wound healing complications, DVT/PE, significant bleeding requiring transfusion, damage to named anatomic structures in the immediate area including named neurovascular structures, malunion/nonunion, implant failure, injury to the radial and/or ulnar nerve with significant motor or sensory dysfunction, and general risks of anesthesia.  The patient voiced understanding and written as well as verbal consent was obtained by myself prior to the procedure.    Procedure Note:  The patient was brought back to the OR and placed supine on the OR table. After successful induction of anesthesia by anesthesia staff, the patient was positioned in the supine position and all bony prominences were padded appropriately.  The surgical field was then provisionally cleansed and then prepped and draped in the usual sterile fashion.    At this time a time-out was performed, with the correct patient, site, and procedure identified.  The universal time out as well as sign your site protocols were followed.  Preoperative antibiotics were verified as administered.     I made a small anterolateral deltoid splitting approach and attention to hemostasis was paid using electrocautery.  The approach easily led to the proximal humerus without difficulty, and the starting point for the humeral nail was obtained with a simple split in the rotator cuff; of note, a pre-existing rotator cuff tear was not seen.  The starting point was verified on biplanar imaging and the opening reamer utilized with soft tissue protection.  The ball tip guide wire was passed into the distal humerus through the fracture and also verified on C-arm.  We measured the length and then the humerus was not reamed.  The definitive nail was placed and appropriate screws were placed through the nail on each side of the fracture.    Final C-arm images were obtained and saved to the [facility] system.      The incisions were then irrigated  using copious sterile saline and then closed in layered fashion, including the rotator cuff.  The surgical sites were infiltrated using local anesthetic, and the arm was sterilely cleansed and dressed.    The patient was then subsequently removed from anesthetic and transferred to to PACU in a stable condition.    All sponge and needle counts were correct at the end of the case.  I was present and participated in all aspects of the procedure.    Prognosis:  The patient will be kept WBAT on the ipsilateral extremity.  DVT prophylaxis is not indicated for this patient and procedure.  The patient is at risk of pain and stiffness of the arm, and we will allow immediate shoulder and elbow ROM to minimize this.      This note/OR report was created with the assistance of  voice recognition software or phone  dictation.  There may be transcription errors as a result of using this technology however minimal. Effort has been made to assure accuracy of transcription but any obvious errors or omissions should be clarified with the author of the document.       Doc Scherer, DO  Orthopedic Trauma Surgery

## 2022-10-26 NOTE — ANESTHESIA PREPROCEDURE EVALUATION
"                                                                                                             10/26/2022  Sumaya Moss is a 82 y.o., female who fell at home onto her Shoulder, sustaining the following injury.  Pain of the Left Shoulder and Pain (unable to obtain vitals, DOI: 10/9/22, patient had a fall and FX Left humerus   She was cleaning and tripped on a cord on 10/09/2022 and fell. No LOC. She takes xarelto. She had acute pain to her left upper extremity.  She also sustained a laceration above her left eyebrow.  She was transported to the emergency department and worked up.  She was found to have a left humeral shaft fracture    Procedure:   INSERTION, INTRAMEDULLARY LEATHA, HUMERUS (Left)      She comes to [facility] OR for the noted procedure under GETA w/ Shoulder block for postOp pain management.  Diagnosis:        Closed displaced comminuted fracture of shaft of left humerus, initial encounter       (Closed displaced comminuted fracture of shaft of left humerus, initial encounter [S42.352A])    PMHx:  Problem List  Current as of 10/26/22 1254  Fracture of humeral shaft, right, closed       Respiratory distress H/o Atrial Fibrillation w/ ablation   Other Medical History   Fractures Hypercholesterolemia   Coronary artery disease Hypertension     Last Xarelto dose: 10/24/22      PSHx/Surgical History:  HYSTERECTOMY ABLATION Cardiac   COLONOSCOPY BRAIN SURGERY           Vital signs:  Pre Vitals  Current as of 10/26/22 1254  BP: 172/77 Pulse: 63   Resp: 18 SpO2: 99   Temp:    Height: 5' 5" (1.651 m) (10/26/22) Weight: 81.6 kg (180 lb) (10/26/22)   BMI: 30 IBW: 57 kg (125 lb 10.6 oz)   Last edited 10/26/22 1233 by MG        EKG:      Pre-op Assessment    I have reviewed the Patient Summary Reports.     I have reviewed the Nursing Notes. I have reviewed the NPO Status.   I have reviewed the Medications.     Review of Systems  Anesthesia Hx:  No problems with previous Anesthesia  "   Social:  Non-Smoker    Hematology/Oncology:  Hematology Normal   Oncology Normal     EENT/Dental:EENT/Dental Normal   Cardiovascular:   Exercise tolerance: good Hypertension CAD  Dysrhythmias atrial fibrillation H/o Atrial Fibrillation with ablation procedure. Functional Capacity good / => 4 METS    Pulmonary:  Pulmonary Normal    Renal/:  Renal/ Normal     Hepatic/GI:  Hepatic/GI Normal    Musculoskeletal:  Musculoskeletal Normal    Neurological:  Neurology Normal    Endocrine:  Endocrine Normal    Dermatological:  Skin Normal    Psych:  Psychiatric Normal              Anesthesia Plan  Type of Anesthesia, risks & benefits discussed:    Anesthesia Type: Gen ETT, Regional  Intra-op Monitoring Plan: Standard ASA Monitors  Post Op Pain Control Plan: IV/PO Opioids PRN and peripheral nerve block  Induction:  IV and Inhalation  Airway Plan: Direct  Informed Consent: Informed consent signed with the Patient and all parties understand the risks and agree with anesthesia plan.  All questions answered. Patient consented to blood products? Yes  ASA Score: 3  Day of Surgery Review of History & Physical: H&P Update referred to the surgeon/provider.    Ready For Surgery From Anesthesia Perspective.     .

## 2022-10-26 NOTE — TRANSFER OF CARE
"Anesthesia Transfer of Care Note    Patient: Sumaya Moss    Procedure(s) Performed: Procedure(s) (LRB):  INSERTION, INTRAMEDULLARY LEATHA, HUMERUS (Left)    Patient location: PACU    Anesthesia Type: general    Transport from OR: Transported from OR on room air with adequate spontaneous ventilation    Post pain: adequate analgesia    Post assessment: no apparent anesthetic complications and tolerated procedure well    Post vital signs: stable    Level of consciousness: sedated    Nausea/Vomiting: no nausea/vomiting    Complications: none    Transfer of care protocol was followed      Last vitals:   Visit Vitals  /72   Pulse (!) 55   Temp 36 °C (96.8 °F)   Resp 16   Ht 5' 5" (1.651 m)   Wt 81.6 kg (180 lb)   SpO2 96%   Breastfeeding No   BMI 29.95 kg/m²     "

## 2022-10-26 NOTE — DISCHARGE INSTRUCTIONS
Keep incision dry and clean and do not wet for the first 48 hours. Do not apply ointments or creams. You may start daily dry dressing changes starting 2 days after surgery. Once wound with no drainage, you no longer need to keep dressing on. You may begin showers 7 days after surgery.         Keep affected extremity elevated. Use ice as needed for 15 minutes at a time.           Weight bearing:  No weight bearing to left arm. Range of motion as tolerated.

## 2022-10-26 NOTE — ANESTHESIA PROCEDURE NOTES
Intubation    Date/Time: 10/26/2022 1:06 PM  Performed by: Edmundo Fitzgerald  Authorized by: Jorge Meneses MD     Intubation:     Induction:  Intravenous    Intubated:  Postinduction    Mask Ventilation:  Easy mask    Attempts:  1    Attempted By:  Student    Method of Intubation:  Direct    Blade:  Frye 2    Laryngeal View Grade: Grade I - full view of cords      Difficult Airway Encountered?: No      Complications:  None    Airway Device:  Oral endotracheal tube    Airway Device Size:  7.5    Style/Cuff Inflation:  Cuffed (inflated to minimal occlusive pressure)    Tube secured:  22    Secured at:  The lips    Placement Verified By:  Capnometry    Complicating Factors:  None    Findings Post-Intubation:  BS equal bilateral and atraumatic/condition of teeth unchanged

## 2022-10-26 NOTE — ANESTHESIA PROCEDURE NOTES
Peripheral Block    Patient location during procedure: pre-op   Block not for primary anesthetic.  Reason for block: at surgeon's request and post-op pain management   Post-op Pain Location: Left Humeral Fracture   Start time: 10/26/2022 12:35 PM  Timeout: 10/26/2022 12:30 PM   End time: 10/26/2022 12:41 PM    Staffing  Authorizing Provider: Jorge Meneses MD  Performing Provider: Jorge Meneses MD    Preanesthetic Checklist  Completed: patient identified, IV checked, site marked, risks and benefits discussed, surgical consent, monitors and equipment checked, pre-op evaluation and timeout performed  Peripheral Block  Patient position: supine  Prep: ChloraPrep  Patient monitoring: heart rate, cardiac monitor, continuous pulse ox, continuous capnometry and frequent blood pressure checks  Block type: supraclavicular  Laterality: left  Injection technique: single shot  Needle  Needle type: Stimuplex   Needle gauge: 22 G  Needle length: 2 in  Needle localization: anatomical landmarks and ultrasound guidance   -ultrasound image captured on disc.  Assessment  Injection assessment: negative aspiration, local visualized surrounding nerve and positive parasthesia  Paresthesia pain: immediately resolved  Heart rate change: no  Slow fractionated injection: yes  Pain Tolerance: comfortable throughout block and no complaints  Medications:    Medications: ropivacaine (NAROPIN) injection 0.5% - Perineural   30 mL - 10/26/2022 12:35:00 PM    Additional Notes  VSS.  [facility] RN monitoring vitals throughout procedure.  Patient tolerated procedure well.

## 2022-10-26 NOTE — H&P (VIEW-ONLY)
[facility]  Orthopedic Trauma  History and Physical    Patient Name: Sumaya Moss  MRN: [id number]  Admission Date: (Not on file)  Hospital Length of Stay: 0 days  Attending Provider: No att. providers found  Primary Care Provider: JAMISON MARQUEZ MD        Chief Complaint:   Chief Complaint   Patient presents with    Left Shoulder - Pain    Pain     unable to obtain vitals, DOI: 10/9/22, patient had a fall and FX Left humerus is here today to follow up with XR, states her pain is a level 9, finished her pain medications would like some more if possible        HPI: She was cleaning and tripped on a cord on 10/09/2022 and fell. No LOC. She takes xarelto. She had acute pain to her left upper extremity.  She also sustained a laceration above her left eyebrow.  She was transported to the emergency department and worked up.  She was found to have a left humeral shaft fracture.  She was placed into a sling and swath and referred for definitive orthopedic care. No numbness/tingling or distal functional deficits. She had a CT of her head, cervical spine, and chest abdomen and pelvis with no acute pathology.  ED notes reviewed. She is accompanied by her . She is in a rolling walker. She also reports some R shoulder pain with reduced ROM today. She reports this started after the fall.        Patient was originally seen by my partner Dr. Burkett.  I have asked to take over care due to earlier OR availability.  She has significantly comminuted right humeral shaft fracture needing surgical fixation.  She denies numbness tingling.    Past Medical History:   Diagnosis Date    Coronary artery disease     Fractures     Hypercholesterolemia     Hypertension     Respiratory distress        Past Surgical History:   Procedure Laterality Date    ABLATION      BRAIN SURGERY      aneurysm    COLONOSCOPY      HYSTERECTOMY         Review of patient's allergies indicates:  No Known Allergies    Current Outpatient  Medications   Medication Sig    atorvastatin (LIPITOR) 10 MG tablet Take 10 mg by mouth once daily.    doxazosin (CARDURA) 4 MG tablet Take 4 mg by mouth once daily.    flecainide (TAMBOCOR) 50 MG Tab Take 50 mg by mouth 2 (two) times a day.    HYDROcodone-acetaminophen (NORCO) 5-325 mg per tablet Take 1 tablet by mouth every 8 (eight) hours as needed for Pain.    losartan (COZAAR) 50 MG tablet Take 50 mg by mouth 2 (two) times a day.    metoprolol succinate (TOPROL-XL) 100 MG 24 hr tablet 100 mg once daily.    ondansetron (ZOFRAN-ODT) 4 MG TbDL Take 1 tablet (4 mg total) by mouth every 8 (eight) hours as needed (nausea vomiting).    XARELTO 20 mg Tab Take 20 mg by mouth once daily.    vitamin D (VITAMIN D3) 1000 units Tab Take 1,000 Units by mouth once daily.     No current facility-administered medications for this visit.     Family History       Problem Relation (Age of Onset)    No Known Problems Mother, Father          Tobacco Use    Smoking status: Never     Passive exposure: Never    Smokeless tobacco: Never   Substance and Sexual Activity    Alcohol use: Never    Drug use: Never    Sexual activity: Yes       ROS:  Constitutional: Denies fever chills  Eyes: No change in vision  ENT: No ringing or current infections  CV: No chest pain  Resp: No labored breathing  MSK: Pain evident at site of injury located in HPI,   Integ: No signs of abrasions or lacerations  Neuro: No numbness or tingling  Lymphatic: No swelling outside the area of injury   Objective:     Vital Signs (Most Recent):    Vital Signs (24h Range):  [unfilled]           There is no height or weight on file to calculate BMI.    [unfilled]    Ortho/SPM Exam  General the patient is alert and oriented x3 no acute distress nontoxic-appearing appropriate affect.    Constitutional: Vital signs are examined and stable.  Resp: No signs of labored breathing                RUE: -Skin: Dressing CDI, No signs of new abrasions or lacerations, no scars            -MSK: STR 5/5 AIN/PIN/Median/Radial/Ulnar motor,           -Neuro:  Sensation intact to light touch C5-T1 dermatomes           -Lymphatic: No signs of  lymphadenopathy,            -CV:  Capillary refill is less than 2 seconds. Radial and ulnar pulses 2/4. Compartments soft and compressible      Significant Labs:   Recent Lab Results       None          All pertinent labs within the past 24 hours have been reviewed.  Recent Lab Results       None             Significant Imaging: I have reviewed all pertinent imaging results/findings.  X-Ray Chest 1 View    Result Date: 10/9/2022  EXAMINATION: XR CHEST 1 VIEW CLINICAL HISTORY: r/o bleeding or hemorrhage; TECHNIQUE: Single frontal view of the chest was performed. COMPARISON: None FINDINGS: The lungs are clear, with normal appearance of pulmonary vasculature and no pleural effusion or pneumothorax. The cardiac silhouette is normal in size. The hilar and mediastinal contours are unremarkable. Bones are intact.     No acute abnormality. Electronically signed by: Peter Mitchell MD Date:    10/09/2022 Time:    13:55    X-Ray Chest PA And Lateral    Result Date: 10/17/2022  EXAMINATION: XR CHEST PA AND LATERAL CLINICAL HISTORY: , Other specified disorders of bone, upper arm. COMPARISON: October 9, 2022 FINDINGS: No alveolar consolidation, effusion, or pneumothorax is seen.   The thoracic aorta is normal  cardiac silhouette, central pulmonary vessels and mediastinum are normal in size and are grossly unremarkable. Some pleural apical capping identified in both apices There are some changes suggestive of a hiatus hernia     No acute chest disease is identified.. Apical pleural capping in both apices. Hiatus hernia Electronically signed by: Vivek Wise Date:    10/17/2022 Time:    12:09    X-ray Shoulder 2 or More Views Right    Result Date: 10/10/2022  See Provider Notes for results. IMPRESSION: Please see provider office/clinic notes for radiology interpretation This  procedure was auto-finalized by: Virtual Radiologist    X-Ray Humerus 2 View Left    Result Date: 10/17/2022  See Provider Notes for results. IMPRESSION: Please see provider office/clinic notes for radiology interpretation This procedure was auto-finalized by: Virtual Radiologist    X-Ray Humerus 2 View Left    Result Date: 10/9/2022  EXAMINATION: XR HUMERUS 2 VIEW LEFT CLINICAL HISTORY: Unspecified fall, initial encounter TECHNIQUE: Two views of the left humerus were obtained. COMPARISON: None. FINDINGS: There is a comminuted fracture of the proximal to mid diaphysis of the humerus with 30 degrees posterolateral apex angulation.    Grossly normal glenohumeral alignment.  No orthogonal view of the shoulder provided. Regional soft tissues are normal.     Angulated fracture of the humeral diaphysis. Electronically signed by: Meme Flynn Date:    10/09/2022 Time:    13:59    CT Head Without Contrast    Result Date: 10/9/2022  EXAMINATION: CT HEAD WITHOUT CONTRAST CLINICAL HISTORY: Trauma; TECHNIQUE: Low dose axial CT images obtained throughout the head without intravenous contrast.  Axial, sagittal and coronal reconstructions were performed and interpreted. DLP: 1136 mGycm All CT scans at this location are performed using dose optimization techniques as appropriate to a performed exam including the following automated exposure control, adjustment of the mA and/or kV according to patient size and/or use of iterative reconstruction technique COMPARISON: No relevant prior available for comparison. FINDINGS: BRAIN: There are post treatment changes of prior aneurysm clipping and coiling the level of the [id number]-hg-Agvhih.  There is associated beam hardening artifact which obscures evaluation.  Gray white differentiation is grossly maintained.  Minimal periventricular and subcortical white matter changes most compatible with chronic small vessel ischemic disease.  No appreciable hemorrhage.  No edema. No mass effect or  midline shift.  The posterior fossa and midline structures are unremarkable. VENTRICLES: Normal in size and configuration. EXTRA-AXIAL: No abnormal extra-axial collections. 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 SINUSES AND MASTOIDS: Mucoperiosteal thickening at the right maxillary sinus.  Patchy opacification of the ethmoid air cells. OTHER: Left supraorbital hematoma.     No appreciable acute intracranial abnormality. 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  There is associated beam hardening artifact which degrades evaluation. Electronically signed by: Meme Flynn Date:    10/09/2022 Time:    13:49    CT Cervical Spine Without Contrast    Result Date: 10/9/2022  EXAMINATION: CT CERVICAL SPINE WITHOUT CONTRAST CLINICAL HISTORY: Trauma; TECHNIQUE: Low dose helical acquired images with axial, sagittal and coronal reformations though the cervical spine.  Contrast was not administered. All CT scans at this location are performed using dose optimization techniques as appropriate to a performed exam including the following automated exposure control, adjustment of the mA and/or kV according to patient size and/or use of iterative reconstruction technique DLP: 541 mGycm COMPARISON: No relevant prior available for comparison. FINDINGS: BONES: No fracture.  Trace anterolisthesis of C7 on T1. The dens is intact, the lateral masses of C1 are normally aligned, and the atlantodental interval is normal. Multilevel left facet arthropathy.  Facet and uncovertebral hypertrophy contribute to neural foraminal stenosis at C5-C6 and C6-C7 on the left. DISCS: Multilevel disc space narrowing, severe at C5-C6 and C6-C7. SPINAL CANAL: No osseous narrowing of the spinal canal. SOFT TISSUES: Regional soft tissues are unremarkable. LUNG APICES: Clear OTHER: There are cervical and cavernous carotid artery calcifications.     No appreciable acute fracture.  Moderate spondylosis. Electronically signed by: Meme Flynn Date:    10/09/2022 Time:    13:53    X-Ray Pelvis Routine AP    Result Date: 10/9/2022  EXAMINATION: XR PELVIS ROUTINE AP CLINICAL HISTORY: r/o bleeding or hemorrhage; TECHNIQUE: AP view of the pelvis was performed. COMPARISON: None. FINDINGS: No appreciable fracture. No dislocation. Regional soft tissues are unremarkable.     No acute osseous abnormality. Electronically signed by: Meme Flynn Date:    10/09/2022 Time:    13:57    CT Chest Abdomen Pelvis With Contrast (xpd)    Result Date: 10/9/2022  EXAMINATION: CT CHEST ABDOMEN PELVIS WITH CONTRAST (XPD) CLINICAL HISTORY: Trauma; TECHNIQUE: Multiple cross-sectional images of the chest, abdomen and pelvis were obtained after the intravenous administration of contrast.  Coronal and sagittal reformatted images obtained.  An automated dose exposure technique was utilized this limits radiation does the patient. COMPARISON: None FINDINGS: Chest: Heart size within normal limits with coronary artery calcifications.  The course and caliber of the thoracic aorta is normal with a triple vessel aortic arch.  No evidence for aortic injury or mediastinal hematoma.  The main pulmonary artery is of normal caliber.  No evidence for hilar or mediastinal adenopathy. Dependent atelectatic changes lungs.  No evidence for consolidation.  No pulmonary contusion, laceration, or pneumothorax.  No pleural thickening or pleural effusion.  The trachea and airway are patent. Abdomen/pelvis: Nonspecific hypodensities identified throughout the liver.  These are nonspecific and statistically represent simple cyst.  Gallbladder is present.  The spleen, adrenals, and pancreas are normal.  Bosniak type 1 cysts are identified with peripelvic cyst. Small hiatal hernia.  Small bowel is of normal caliber.  Colon is of normal caliber.  The appendix is not identified. The uterus is surgically absent.  No evidence for adnexal mass.   The bladder is under distended with pelvic floor lacks a shin.  No evidence for aortic injury.  The course and caliber of the abdominal aorta is normal with scattered calcified atheromatous disease.  No free fluid in the abdomen pelvis.  No evidence for adenopathy. No suspicious osseous lesions.  Spondylotic changes are identified.  The soft tissues are normal.     No sequela of trauma involving the chest, abdomen, pelvis.  Other secondary findings as above. Electronically signed by: Peter Mitchell MD Date:    10/09/2022 Time:    13:53       Assessment/Plan:     There are no hospital problems to display for this patient.    Patient has a right comminuted humeral shaft fracture.  We have offered non operative management with patient has failed due to significant pain and lack of mobilization.  We have offered her surgical stabilization of the fracture to allow it decrease pain and increase range of motion.  Patient understands risks of this procedure.    I explained that surgery and the nature of their condition are not without risks. These include, but are not limited to, bleeding, infection, neurovascular compromise, malunion, nonunion, hardware complications, wound complications, scarring, cosmetic defects, need for later and/or repeated surgeries, pain, loss of ROM, loss of function, PTOA, deformity, stance/gait and/or functional abnormalities, thromboembolic complications, compartment syndrome, loss of limb, loss of life, anesthetic complications, and other imponderables. I explained that these can occur despite the adequacy of treatments rendered, and that their risks are heightened given the nature of their condition. They verbalized understanding. They would like to continue with surgery at this time. If appropriate family was involved with surgical discussion.           The patient has been prescribed an opioid analgesic that exceeds the limits set forth in the opioid prescribing rules for acute pain. Treatment  with non-opioid medications is not a suitable alternative given the patient's condition. The patient meets exception criteria for: exceeding the 5 or 7 day rule when prescribing narcotic medications because: he/she suffers from a injury that requires more than 5 or 7 day duration because the patient is continuing to recover. This is the minimum duration consistent with the patient's medical condition.  The prescribed dose and/or duration is the level necessary to therapeutically treat the patient's pain. The patient was advised of the risks and benefits of opioid medications, including the potential for addiction.    This note/OR report was created with the assistance of  voice recognition software or phone  dictation.  There may be transcription errors as a result of using this technology however minimal. Effort has been made to assure accuracy of transcription but any obvious errors or omissions should be clarified with the author of the document.       Doc Scherer DO   Orthopedic Trauma Surgery   [facility]

## 2022-10-26 NOTE — ANESTHESIA POSTPROCEDURE EVALUATION
Anesthesia Post Evaluation    Patient: Sumaya Moss    Procedure(s) Performed: Procedure(s) (LRB):  INSERTION, INTRAMEDULLARY LEATHA, HUMERUS (Left)    Final Anesthesia Type: general      Patient location during evaluation: PACU  Patient participation: Yes- Able to Participate  Level of consciousness: awake and alert  Post-procedure vital signs: reviewed and stable  Pain management: adequate  Airway patency: patent  DANIEL mitigation strategies: Multimodal analgesia  PONV status at discharge: No PONV  Anesthetic complications: no      Cardiovascular status: blood pressure returned to baseline  Respiratory status: spontaneous ventilation  Hydration status: euvolemic  Follow-up not needed.          Vitals Value Taken Time   /72 10/26/22 1506   Temp 36 °C (96.8 °F) 10/26/22 1426   Pulse 60 10/26/22 1524   Resp 16 10/26/22 1500   SpO2 95 % 10/26/22 1524         Event Time   Out of Recovery 15:00:00         Pain/Seth Score: Pain Rating Prior to Med Admin: 10 (10/26/2022  9:15 AM)  Seth Score: 10 (10/26/2022  3:18 PM)

## 2022-10-31 VITALS
BODY MASS INDEX: 29.99 KG/M2 | DIASTOLIC BLOOD PRESSURE: 76 MMHG | RESPIRATION RATE: 16 BRPM | SYSTOLIC BLOOD PRESSURE: 116 MMHG | TEMPERATURE: 97 F | HEART RATE: 68 BPM | HEIGHT: 65 IN | WEIGHT: 180 LBS | OXYGEN SATURATION: 98 %

## 2022-11-16 ENCOUNTER — HOSPITAL ENCOUNTER (OUTPATIENT)
Dept: RADIOLOGY | Facility: CLINIC | Age: 82
Discharge: HOME | End: 2022-11-16
Attending: ORTHOPAEDIC SURGERY
Payer: MEDICARE

## 2022-11-16 ENCOUNTER — OFFICE VISIT (OUTPATIENT)
Dept: ORTHOPEDICS | Facility: CLINIC | Age: 82
End: 2022-11-16
Payer: MEDICARE

## 2022-11-16 VITALS
HEIGHT: 65 IN | TEMPERATURE: 97 F | BODY MASS INDEX: 29.99 KG/M2 | SYSTOLIC BLOOD PRESSURE: 103 MMHG | DIASTOLIC BLOOD PRESSURE: 73 MMHG | HEART RATE: 64 BPM | WEIGHT: 180 LBS

## 2022-11-16 DIAGNOSIS — S42.352A CLOSED DISPLACED COMMINUTED FRACTURE OF SHAFT OF LEFT HUMERUS, INITIAL ENCOUNTER: Primary | ICD-10-CM

## 2022-11-16 DIAGNOSIS — S42.352A CLOSED DISPLACED COMMINUTED FRACTURE OF SHAFT OF LEFT HUMERUS, INITIAL ENCOUNTER: ICD-10-CM

## 2022-11-16 PROCEDURE — 99024 POSTOP FOLLOW-UP VISIT: CPT | Mod: POP,,, | Performed by: ORTHOPAEDIC SURGERY

## 2022-11-16 PROCEDURE — 73060 XR HUMERUS 2 VIEW LEFT: ICD-10-PCS | Mod: LT,,, | Performed by: ORTHOPAEDIC SURGERY

## 2022-11-16 PROCEDURE — 73060 X-RAY EXAM OF HUMERUS: CPT | Mod: LT,,, | Performed by: ORTHOPAEDIC SURGERY

## 2022-11-16 PROCEDURE — 99024 PR POST-OP FOLLOW-UP VISIT: ICD-10-PCS | Mod: POP,,, | Performed by: ORTHOPAEDIC SURGERY

## 2022-11-16 RX ORDER — HYDROCODONE BITARTRATE AND ACETAMINOPHEN 10; 325 MG/1; MG/1
1 TABLET ORAL EVERY 4 HOURS PRN
Qty: 42 TABLET | Refills: 0 | Status: SHIPPED | OUTPATIENT
Start: 2022-11-16 | End: 2022-11-23

## 2022-11-16 NOTE — PROGRESS NOTES
Subjective:       Patient ID: Sumaya Moss is a 82 y.o. female.  Chief Complaint   Patient presents with    Left Shoulder - Post-op Evaluation     3 week f/u from orif left humerus 10/26/22. Concerns of increase of swelling. Overall doing well. In wheelchair.        HPI:  Patient is 3 weeks out from a severely segmental comminuted left humeral shaft fracture.  She has some postoperative swelling.  She is been doing well with 1 pain pill at night and Tylenol throughout the day.  Dull achy pain left shoulder no signs of radiation.  Biggest complaint today is swelling of the hand.  This has improved over the last few days.  She is starting to have healing which is a good sign that recovery.  Denies numbness tingling.    ROS:  Constitutional: Denies fever chills  Eyes: No change in vision  ENT: No ringing or current infections  CV: No chest pain  Resp: No labored breathing  MSK: Pain evident at site of injury located in HPI,   Integ: No signs of abrasions or lacerations  Neuro: No numbness or tingling  Lymphatic: No swelling outside the area of injury     Current Outpatient Medications on File Prior to Visit   Medication Sig Dispense Refill    atorvastatin (LIPITOR) 10 MG tablet Take 10 mg by mouth once daily.      doxazosin (CARDURA) 4 MG tablet Take 4 mg by mouth once daily.      flecainide (TAMBOCOR) 50 MG Tab Take 50 mg by mouth 2 (two) times a day.      losartan (COZAAR) 50 MG tablet Take 50 mg by mouth 2 (two) times a day.      metoprolol succinate (TOPROL-XL) 100 MG 24 hr tablet 100 mg once daily.      vitamin D (VITAMIN D3) 1000 units Tab Take 1,000 Units by mouth once daily.      XARELTO 20 mg Tab Take 20 mg by mouth once daily.      ondansetron (ZOFRAN-ODT) 4 MG TbDL Take 1 tablet (4 mg total) by mouth every 8 (eight) hours as needed (nausea vomiting). 12 tablet 0     No current facility-administered medications on file prior to visit.          Objective:      /73   Pulse 64   Temp 97.4 °F  "(36.3 °C)   Ht 5' 5" (1.651 m)   Wt 81.6 kg (180 lb)   BMI 29.95 kg/m²   General the patient is alert and oriented x3 no acute distress nontoxic-appearing appropriate affect.    Constitutional: Vital signs are examined and stable.  Resp: No signs of labored breathing    LUE: --Skin:  Sutures intact and removed no signs of infection, No signs of new abrasions or lacerations, no scars           -MSK: STR 5/5 AIN/PIN/Median/Radial/Ulnar motor           -Neuro:  Sensation intact to light touch C5-T1 dermatomes           -Lymphatic: No signs of lymphadenopathy, No signs of swelling,           -CV:Capillary refill is less than 2 seconds. Radial and ulnar pulses 2/4. Compartments Soft and compressible    Body mass index is 29.95 kg/m².  Ideal body weight: 57 kg (125 lb 10.6 oz)  Adjusted ideal body weight: 66.9 kg (147 lb 6.4 oz)  No results found for: HGBA1C  Hgb   Date Value Ref Range Status   10/17/2022 9.7 (L) 12.0 - 16.0 gm/dL Final   10/09/2022 13.0 12.0 - 16.0 gm/dL Final     No results found for: [id number]  WBC   Date Value Ref Range Status   10/17/2022 12.5 (H) 4.5 - 11.5 x10(3)/mcL Final   10/09/2022 9.1 4.5 - 11.5 x10(3)/mcL Final       Radiology:  Two views left humerus skeletally mature individual showing intact hardware no signs of displacement of fracture fragments.  Minimal signs of healing at this time as expected        Assessment:         1. Closed displaced comminuted fracture of shaft of left humerus, initial encounter  X-Ray Humerus 2 View Left    Ambulatory referral/consult to Physical/Occupational Therapy              Plan:         No follow-ups on file.    Sumaya was seen today for post-op evaluation.    Diagnoses and all orders for this visit:    Closed displaced comminuted fracture of shaft of left humerus, initial encounter  -     X-Ray Humerus 2 View Left; Future  -     Ambulatory referral/consult to Physical/Occupational Therapy; Future      Patient is here for 3 week checkup.  Sutures " removed no complications.  Patient has some mild swelling in the hand and forearm which is improving.  She is moving her elbow and her wrist.  Patient will be nonweightbearing range of motion as tolerated physical therapy script given to the patient.  Follow-up 6-8 weeks.  Refill the pain medication completed.        This note/OR report was created with the assistance of  voice recognition software or phone  dictation.  There may be transcription errors as a result of using this technology however minimal. Effort has been made to assure accuracy of transcription but any obvious errors or omissions should be clarified with the author of the document.     Doc Scherer DO  Orthopedic Trauma Surgery  11/16/2022      Future Appointments   Date Time Provider Department Center   1/5/2023  8:00 AM Doc Scherer DO [facility]   2/7/2023  9:00 AM Benedict Jackson II, MD [facility]

## 2022-11-21 ENCOUNTER — TELEPHONE (OUTPATIENT)
Dept: ORTHOPEDICS | Facility: CLINIC | Age: 82
End: 2022-11-21
Payer: MEDICARE

## 2022-11-28 ENCOUNTER — CLINICAL SUPPORT (OUTPATIENT)
Dept: INTERNAL MEDICINE | Facility: CLINIC | Age: 82
End: 2022-11-28
Payer: MEDICARE

## 2022-11-28 DIAGNOSIS — Z23 NEED FOR VACCINATION: Primary | ICD-10-CM

## 2022-11-28 PROCEDURE — 90694 VACC AIIV4 NO PRSRV 0.5ML IM: CPT | Mod: ,,, | Performed by: INTERNAL MEDICINE

## 2022-11-28 PROCEDURE — 90694 FLU VACCINE - QUADRIVALENT - ADJUVANTED: ICD-10-PCS | Mod: ,,, | Performed by: INTERNAL MEDICINE

## 2022-11-28 PROCEDURE — G0008 FLU VACCINE - QUADRIVALENT - ADJUVANTED: ICD-10-PCS | Mod: ,,, | Performed by: INTERNAL MEDICINE

## 2022-11-28 PROCEDURE — G0008 ADMIN INFLUENZA VIRUS VAC: HCPCS | Mod: ,,, | Performed by: INTERNAL MEDICINE

## 2022-11-28 NOTE — PROGRESS NOTES
Pt here today for nurse visit to receive flu vaccine. Administered to right deltoid. Pt tolerated well and d/c'd by clinical staff

## 2023-01-05 ENCOUNTER — OFFICE VISIT (OUTPATIENT)
Dept: ORTHOPEDICS | Facility: CLINIC | Age: 83
End: 2023-01-05
Payer: MEDICARE

## 2023-01-05 ENCOUNTER — HOSPITAL ENCOUNTER (OUTPATIENT)
Dept: RADIOLOGY | Facility: CLINIC | Age: 83
Discharge: HOME | End: 2023-01-05
Attending: ORTHOPAEDIC SURGERY
Payer: MEDICARE

## 2023-01-05 VITALS
DIASTOLIC BLOOD PRESSURE: 71 MMHG | WEIGHT: 180 LBS | HEART RATE: 67 BPM | BODY MASS INDEX: 29.99 KG/M2 | SYSTOLIC BLOOD PRESSURE: 139 MMHG | HEIGHT: 65 IN | TEMPERATURE: 98 F

## 2023-01-05 DIAGNOSIS — S42.352A CLOSED DISPLACED COMMINUTED FRACTURE OF SHAFT OF LEFT HUMERUS, INITIAL ENCOUNTER: ICD-10-CM

## 2023-01-05 DIAGNOSIS — S42.352A CLOSED DISPLACED COMMINUTED FRACTURE OF SHAFT OF LEFT HUMERUS, INITIAL ENCOUNTER: Primary | ICD-10-CM

## 2023-01-05 PROCEDURE — 99024 POSTOP FOLLOW-UP VISIT: CPT | Mod: POP,,, | Performed by: PHYSICIAN ASSISTANT

## 2023-01-05 PROCEDURE — 73060 X-RAY EXAM OF HUMERUS: CPT | Mod: LT,,, | Performed by: ORTHOPAEDIC SURGERY

## 2023-01-05 PROCEDURE — 73060 XR HUMERUS 2 VIEW LEFT: ICD-10-PCS | Mod: LT,,, | Performed by: ORTHOPAEDIC SURGERY

## 2023-01-05 PROCEDURE — 99024 PR POST-OP FOLLOW-UP VISIT: ICD-10-PCS | Mod: POP,,, | Performed by: PHYSICIAN ASSISTANT

## 2023-01-05 NOTE — PROGRESS NOTES
"  Subjective:       Patient ID: Sumaya Moss is a 82 y.o. female.  Chief Complaint   Patient presents with    Right Upper Arm - Follow-up     10 week f/u from orif left humerus shaft fx. No complaints. Overall doing well.         HPI    Patient presents for approx 10 week follow up IMN left humeral shaft. States doing well overall, pain is greatly improving. She is continuing to work with physical therapy and is seeing improvement in her shoulder range of motion. She does still have some continued swelling distally to her left hand and forearm which comes and goes. She has some complaints of right shoulder pain and generalized deconditioning and weakness since her injury.     ROS:  Constitutional: Denies fever chills  Eyes: No change in vision  ENT: No ringing or current infections  CV: No chest pain  Resp: No labored breathing  MSK: Pain evident at site of injury located in HPI,   Integ: No signs of abrasions or lacerations  Neuro: No numbness or tingling  Lymphatic: No swelling outside the area of injury     Current Outpatient Medications on File Prior to Visit   Medication Sig Dispense Refill    atorvastatin (LIPITOR) 10 MG tablet Take 10 mg by mouth once daily.      doxazosin (CARDURA) 4 MG tablet Take 4 mg by mouth once daily.      flecainide (TAMBOCOR) 50 MG Tab Take 50 mg by mouth 2 (two) times a day.      losartan (COZAAR) 50 MG tablet Take 50 mg by mouth 2 (two) times a day.      metoprolol succinate (TOPROL-XL) 100 MG 24 hr tablet 100 mg once daily.      ondansetron (ZOFRAN-ODT) 4 MG TbDL Take 1 tablet (4 mg total) by mouth every 8 (eight) hours as needed (nausea vomiting). 12 tablet 0    vitamin D (VITAMIN D3) 1000 units Tab Take 1,000 Units by mouth once daily.      XARELTO 20 mg Tab Take 20 mg by mouth once daily.       No current facility-administered medications on file prior to visit.          Objective:      /71   Pulse 67   Temp 98.1 °F (36.7 °C)   Ht 5' 5" (1.651 m)   Wt 81.6 kg " (180 lb)   BMI 29.95 kg/m²   Physical Exam  General the patient is alert and oriented x3 no acute distress nontoxic-appearing appropriate affect.    Constitutional: Vital signs are examined and stable.  Resp: No signs of labored breathing    Musculoskeletal:   Left upper extremity: mild swelling, resolving ecchymosis; compartments are soft and compressible; Tolerates passive ROM to 90 degrees abduction and flexion, appropriate tenderness to palpation; AIN/PIN/Ulnar motor intact; SILT distally;BCR distally; Radial pulse 2+        Body mass index is 29.95 kg/m².  Ideal body weight: 57 kg (125 lb 10.6 oz)  Adjusted ideal body weight: 66.9 kg (147 lb 6.4 oz)  No results found for: HGBA1C  Hgb   Date Value Ref Range Status   10/17/2022 9.7 (L) 12.0 - 16.0 gm/dL Final   10/09/2022 13.0 12.0 - 16.0 gm/dL Final     Hct   Date Value Ref Range Status   10/17/2022 30.1 (L) 37.0 - 47.0 % Final   10/09/2022 39.7 37.0 - 47.0 % Final     No results found for: IRON  No components found for: FROLATE  No results found for: [id number]  WBC   Date Value Ref Range Status   10/17/2022 12.5 (H) 4.5 - 11.5 x10(3)/mcL Final   10/09/2022 9.1 4.5 - 11.5 x10(3)/mcL Final       Radiology: 3 view x ray of the left humerus: hardware intact without signs of loosening or failure. Continued bony consolidation as expected.        Assessment:         1. Closed displaced comminuted fracture of shaft of left humerus, initial encounter  X-Ray Humerus 2 View Left    Ambulatory referral/consult to Physical/Occupational Therapy              Plan:         No follow-ups on file.    Sumaya was seen today for follow-up.    Diagnoses and all orders for this visit:    Closed displaced comminuted fracture of shaft of left humerus, initial encounter  -     X-Ray Humerus 2 View Left; Future  -     Ambulatory referral/consult to Physical/Occupational Therapy; Future        -Continued signs of healing on x-ray today. Progress to WBAT and ROMAT at this  time  -Continue to work with physical therapy on mobilizing.   - Recommend therapy for generalized conditioning as well  -Follow up in 2 months for repeat x rays and evaluation  -ED precautions given    The above findings, diagnostics, and treatment plan were discussed with Dr. Scherer who is in agreement with the plan of care except as stated in additional documentation.       Norma Wall PA-C          Future Appointments   Date Time Provider Department Center   2/7/2023  9:00 AM Benedict Jackson II, MD [facility]   3/6/2023  8:00 AM Doc Scherer,  [facility]

## 2023-01-30 ENCOUNTER — TELEPHONE (OUTPATIENT)
Dept: INTERNAL MEDICINE | Facility: CLINIC | Age: 83
End: 2023-01-30
Payer: COMMERCIAL

## 2023-01-30 DIAGNOSIS — I10 HYPERTENSION, UNSPECIFIED TYPE: ICD-10-CM

## 2023-01-30 DIAGNOSIS — E55.9 VITAMIN D DEFICIENCY: ICD-10-CM

## 2023-01-30 DIAGNOSIS — I48.91 ATRIAL FIBRILLATION, UNSPECIFIED TYPE: ICD-10-CM

## 2023-01-30 DIAGNOSIS — Z00.00 WELLNESS EXAMINATION: Primary | ICD-10-CM

## 2023-01-30 DIAGNOSIS — R53.83 FATIGUE, UNSPECIFIED TYPE: ICD-10-CM

## 2023-01-30 NOTE — TELEPHONE ENCOUNTER
----- Message from Chinmay Segura LPN sent at 1/30/2023 11:26 AM CST -----  Regarding: cesia barrientos 2/7 @ 9  Are there any outstanding tasks in patient chart? Needs fasting labs    Is there documentation of outstanding tasks in patient chart? no    Has patient been to the ER, urgent care, or another physician since last visit?    Has patient done any blood work or x-rays since last visit?

## 2023-01-31 ENCOUNTER — LAB VISIT (OUTPATIENT)
Dept: LAB | Facility: HOSPITAL | Age: 83
End: 2023-01-31
Attending: INTERNAL MEDICINE
Payer: MEDICARE

## 2023-01-31 DIAGNOSIS — E55.9 VITAMIN D DEFICIENCY: ICD-10-CM

## 2023-01-31 DIAGNOSIS — I10 HYPERTENSION, UNSPECIFIED TYPE: ICD-10-CM

## 2023-01-31 DIAGNOSIS — Z00.00 WELLNESS EXAMINATION: ICD-10-CM

## 2023-01-31 DIAGNOSIS — I48.91 ATRIAL FIBRILLATION, UNSPECIFIED TYPE: ICD-10-CM

## 2023-01-31 DIAGNOSIS — R53.83 FATIGUE, UNSPECIFIED TYPE: ICD-10-CM

## 2023-01-31 LAB
ALBUMIN SERPL-MCNC: 3.6 G/DL (ref 3.4–4.8)
ALBUMIN/GLOB SERPL: 1.3 RATIO (ref 1.1–2)
ALP SERPL-CCNC: 134 UNIT/L (ref 40–150)
ALT SERPL-CCNC: 12 UNIT/L (ref 0–55)
APPEARANCE UR: CLEAR
AST SERPL-CCNC: 13 UNIT/L (ref 5–34)
BACTERIA #/AREA URNS AUTO: NORMAL /HPF
BASOPHILS # BLD AUTO: 0.04 X10(3)/MCL (ref 0–0.2)
BASOPHILS NFR BLD AUTO: 0.5 %
BILIRUB UR QL STRIP.AUTO: NEGATIVE MG/DL
BILIRUBIN DIRECT+TOT PNL SERPL-MCNC: 0.4 MG/DL
BUN SERPL-MCNC: 15.8 MG/DL (ref 9.8–20.1)
CALCIUM SERPL-MCNC: 10.5 MG/DL (ref 8.4–10.2)
CHLORIDE SERPL-SCNC: 108 MMOL/L (ref 98–107)
CHOLEST SERPL-MCNC: 138 MG/DL
CHOLEST/HDLC SERPL: 3 {RATIO} (ref 0–5)
CO2 SERPL-SCNC: 27 MMOL/L (ref 23–31)
COLOR UR AUTO: YELLOW
CREAT SERPL-MCNC: 0.83 MG/DL (ref 0.55–1.02)
DEPRECATED CALCIDIOL+CALCIFEROL SERPL-MC: 28.4 NG/ML (ref 30–80)
EOSINOPHIL # BLD AUTO: 0.18 X10(3)/MCL (ref 0–0.9)
EOSINOPHIL NFR BLD AUTO: 2.2 %
ERYTHROCYTE [DISTWIDTH] IN BLOOD BY AUTOMATED COUNT: 12.9 % (ref 11.5–17)
GFR SERPLBLD CREATININE-BSD FMLA CKD-EPI: >60 MLS/MIN/1.73/M2
GLOBULIN SER-MCNC: 2.8 GM/DL (ref 2.4–3.5)
GLUCOSE SERPL-MCNC: 95 MG/DL (ref 82–115)
GLUCOSE UR QL STRIP.AUTO: NEGATIVE MG/DL
HCT VFR BLD AUTO: 42 % (ref 37–47)
HDLC SERPL-MCNC: 55 MG/DL (ref 35–60)
HGB BLD-MCNC: 13.5 GM/DL (ref 12–16)
IMM GRANULOCYTES # BLD AUTO: 0.02 X10(3)/MCL (ref 0–0.04)
IMM GRANULOCYTES NFR BLD AUTO: 0.2 %
KETONES UR QL STRIP.AUTO: NEGATIVE MG/DL
LDLC SERPL CALC-MCNC: 63 MG/DL (ref 50–140)
LEUKOCYTE ESTERASE UR QL STRIP.AUTO: ABNORMAL UNIT/L
LYMPHOCYTES # BLD AUTO: 2.19 X10(3)/MCL (ref 0.6–4.6)
LYMPHOCYTES NFR BLD AUTO: 26.4 %
MCH RBC QN AUTO: 31 PG
MCHC RBC AUTO-ENTMCNC: 32.1 MG/DL (ref 33–36)
MCV RBC AUTO: 96.3 FL (ref 80–94)
MONOCYTES # BLD AUTO: 0.96 X10(3)/MCL (ref 0.1–1.3)
MONOCYTES NFR BLD AUTO: 11.6 %
NEUTROPHILS # BLD AUTO: 4.89 X10(3)/MCL (ref 2.1–9.2)
NEUTROPHILS NFR BLD AUTO: 59.1 %
NITRITE UR QL STRIP.AUTO: NEGATIVE
NRBC BLD AUTO-RTO: 0 %
PH UR STRIP.AUTO: 5.5 [PH]
PLATELET # BLD AUTO: 176 X10(3)/MCL (ref 130–400)
PMV BLD AUTO: 9.6 FL (ref 7.4–10.4)
POTASSIUM SERPL-SCNC: 5 MMOL/L (ref 3.5–5.1)
PROT SERPL-MCNC: 6.4 GM/DL (ref 5.8–7.6)
PROT UR QL STRIP.AUTO: NEGATIVE MG/DL
RBC # BLD AUTO: 4.36 X10(6)/MCL (ref 4.2–5.4)
RBC #/AREA URNS AUTO: <5 /HPF
RBC UR QL AUTO: ABNORMAL UNIT/L
SODIUM SERPL-SCNC: 142 MMOL/L (ref 136–145)
SP GR UR STRIP.AUTO: 1.02 (ref 1–1.03)
SQUAMOUS #/AREA URNS AUTO: <5 /HPF
TRIGL SERPL-MCNC: 99 MG/DL (ref 37–140)
TSH SERPL-ACNC: 2.1 UIU/ML (ref 0.35–4.94)
UROBILINOGEN UR STRIP-ACNC: 0.2 MG/DL
VLDLC SERPL CALC-MCNC: 20 MG/DL
WBC # SPEC AUTO: 8.3 X10(3)/MCL (ref 4.5–11.5)
WBC #/AREA URNS AUTO: <5 /HPF

## 2023-01-31 PROCEDURE — 81001 URINALYSIS AUTO W/SCOPE: CPT

## 2023-01-31 PROCEDURE — 82306 VITAMIN D 25 HYDROXY: CPT

## 2023-01-31 PROCEDURE — 84443 ASSAY THYROID STIM HORMONE: CPT

## 2023-01-31 PROCEDURE — 80053 COMPREHEN METABOLIC PANEL: CPT

## 2023-01-31 PROCEDURE — 36415 COLL VENOUS BLD VENIPUNCTURE: CPT

## 2023-01-31 PROCEDURE — 80061 LIPID PANEL: CPT

## 2023-01-31 PROCEDURE — 85025 COMPLETE CBC W/AUTO DIFF WBC: CPT

## 2023-02-07 ENCOUNTER — OFFICE VISIT (OUTPATIENT)
Dept: INTERNAL MEDICINE | Facility: CLINIC | Age: 83
End: 2023-02-07
Payer: MEDICARE

## 2023-02-07 VITALS
HEIGHT: 65 IN | BODY MASS INDEX: 29.66 KG/M2 | DIASTOLIC BLOOD PRESSURE: 80 MMHG | HEART RATE: 66 BPM | WEIGHT: 178 LBS | OXYGEN SATURATION: 96 % | RESPIRATION RATE: 18 BRPM | SYSTOLIC BLOOD PRESSURE: 144 MMHG

## 2023-02-07 DIAGNOSIS — I70.0 AORTIC ATHEROSCLEROSIS: ICD-10-CM

## 2023-02-07 DIAGNOSIS — M81.0 OSTEOPOROSIS, UNSPECIFIED OSTEOPOROSIS TYPE, UNSPECIFIED PATHOLOGICAL FRACTURE PRESENCE: ICD-10-CM

## 2023-02-07 DIAGNOSIS — Z00.00 ANNUAL PHYSICAL EXAM: Primary | ICD-10-CM

## 2023-02-07 DIAGNOSIS — S42.351A CLOSED DISPLACED COMMINUTED FRACTURE OF SHAFT OF RIGHT HUMERUS, INITIAL ENCOUNTER: ICD-10-CM

## 2023-02-07 DIAGNOSIS — I48.92 ATRIAL FLUTTER, UNSPECIFIED TYPE: ICD-10-CM

## 2023-02-07 PROCEDURE — G0439 PPPS, SUBSEQ VISIT: HCPCS | Mod: ,,, | Performed by: INTERNAL MEDICINE

## 2023-02-07 PROCEDURE — G0439 PR MEDICARE ANNUAL WELLNESS SUBSEQUENT VISIT: ICD-10-PCS | Mod: ,,, | Performed by: INTERNAL MEDICINE

## 2023-02-07 NOTE — PROGRESS NOTES
Patient ID: Sumaya Moss is a 82 y.o. female.    Chief Complaint: Medicare AWV (Labs 1/31)      Patient Care Team:  Benedict Jackson II, MD as PCP - General (Internal Medicine)     HPI:   Patient presents here today for above reason.     Ms. Darnell is 82-year-old female presents today annual visit.  Actually doing quite well she did have an accidental fall with resulting left humeral displaced fracture underwent ORIF.  Doing better in that regard.  Has been awhile since she is been with a DEXA scan.  She is on vitamin-D and calcium.  Labs are all within normal limits age-appropriate screening up-to-date here for follow-up.  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  She does have a history of atrial fib on anticoagulation.  Doing well otherwise    The patient's Health Maintenance was reviewed and the following appears to be due at this time:   Health Maintenance Due   Topic Date Due    TETANUS VACCINE  Never done    Shingles Vaccine (1 of 2) Never done    Pneumococcal Vaccines (Age 65+) (2 - PCV) 12/31/2020    COVID-19 Vaccine (4 - Booster for Pfizer series) 12/10/2021    DEXA Scan  09/10/2022        Past Medical History:  Past Medical History:   Diagnosis Date    Coronary artery disease     Fractures     Hypercholesterolemia     Hypertension     Respiratory distress      Past Surgical History:   Procedure Laterality Date    ABLATION      BRAIN SURGERY      aneurysm    COLONOSCOPY      HYSTERECTOMY      INTRAMEDULLARY RODDING OF HUMERUS Left 10/26/2022    Procedure: INSERTION, INTRAMEDULLARY LEATHA, HUMERUS;  Surgeon: Doc Scherer DO;  Location: [facility];  Service: Orthopedics;  Laterality: Left;     Review of patient's allergies indicates:  No Known Allergies  Current Outpatient Medications on File Prior to Visit   Medication Sig Dispense Refill    atorvastatin (LIPITOR) 10 MG tablet Take 10 mg by mouth once daily.      doxazosin (CARDURA) 4 MG tablet Take 4 mg by mouth once daily.      flecainide  (TAMBOCOR) 50 MG Tab Take 50 mg by mouth 2 (two) times a day.      losartan (COZAAR) 50 MG tablet Take 50 mg by mouth 2 (two) times a day.      metoprolol succinate (TOPROL-XL) 100 MG 24 hr tablet 100 mg once daily.      vitamin D (VITAMIN D3) 1000 units Tab Take 1,000 Units by mouth once daily.      XARELTO 20 mg Tab Take 20 mg by mouth once daily.      [DISCONTINUED] ondansetron (ZOFRAN-ODT) 4 MG TbDL Take 1 tablet (4 mg total) by mouth every 8 (eight) hours as needed (nausea vomiting). 12 tablet 0     No current facility-administered medications on file prior to visit.     Social History     Socioeconomic History    Marital status:    Tobacco Use    Smoking status: Never     Passive exposure: Never    Smokeless tobacco: Never   Substance and Sexual Activity    Alcohol use: Never    Drug use: Never    Sexual activity: Yes   Social History Narrative    ** Merged History Encounter **          Family History   Problem Relation Age of Onset    No Known Problems Mother     No Known Problems Father        ROS:   Review of Systems  Constitutional: No weight gain, No fever, No chills, No fatigue.   Eyes: No blurring, No visual disturbances.   Ear/Nose/Mouth/Throat: No decreased hearing, No ear pain, No nasal congestion, No sore throat.   Respiratory: No shortness of breath, No cough, No wheezing.   Cardiovascular: No chest pain, No palpitations, No peripheral edema.   Gastrointestinal: No nausea, No vomiting, No diarrhea, No constipation, No abdominal pain.   Genitourinary: No dysuria, No hematuria.   Hematology/Lymphatics: No bruising tendency, No bleeding tendency, No swollen lymph glands.   Endocrine: No excessive thirst, No polyuria, No excessive hunger.   Musculoskeletal: No joint pain, No muscle pain, No decreased range of motion.   Integumentary: No rash, No pruritus.   Neurologic: No abnormal balance, No confusion, No headache.   Psychiatric: No anxiety, No depression, Not suicidal, No hallucinations.         Patient Reported Health Risk Assessment  What is your age?: 80 or older  Are you male or female?: Female  During the past four weeks, how much have you been bothered by emotional problems such as feeling anxious, depressed, irritable, sad, or downhearted and blue?: Not at all  During the past five weeks, has your physical and/or emotional health limited your social activities with family, friends, neighbors, or groups?: Not at all  During the past four weeks, how much bodily pain have you generally had?: No pain  During the past four weeks, was someone available to help if you needed and wanted help?: Yes, as much as I wanted  During the past four weeks, what was the hardest physical activity you could do for at least two minutes?: Moderate  Can you get to places out of walking distance without help?  (For example, can you travel alone on buses or taxis, or drive your own car?): Yes  Can you go shopping for groceries or clothes without someone's help?: Yes  Can you prepare your own meals?: Yes  Can you do your own housework without help?: Yes  Because of any health problems, do you need the help of another person with your personal care needs such as eating, bathing, dressing, or getting around the house?: No  Can you handle your own money without help?: Yes  During the past four weeks, how would you rate your health in general?: Excellent  How have things been going for you during the past four weeks?: Very well  Are you having difficulties driving your car?: No  Do you always fasten your seat belt when you are in a car?: Yes, usually  How often in the past four weeks have you been bothered by falling or dizzy when standing up?: Never  How often in the past four weeks have you been bothered by sexual problems?: Never  How often in the past four weeks have you been bothered by trouble eating well?: Never  How often in the past four weeks have you been bothered by teeth or denture problems?: Never  How often in  "the past four weeks have you been bothered with problems using the telephone?: Never  How often in the past four weeks have you been bothered by tiredness or fatigue?: Never  Have you fallen two or more times in the past year?: No  Are you afraid of falling?: No  Are you a smoker?: No  During the past four weeks, how many drinks of wine, beer, or other alcoholic beverages did you have?: No alcohol at all  Do you exercise for about 20 minutes three or more days a week?: Yes, some of the time  Have you been given any information to help you with hazards in your house that might hurt you?: Yes  Have you been given any information to help you with keeping track of your medications?: Yes  How often do you have trouble taking medicines the way you've been told to take them?: I always take them as prescribed  How confident are you that you can control and manage most of your health problems?: Very confident  What is your race? (Check all that apply.):     Vitals/PE:   BP (!) 144/80 (BP Location: Left arm, Patient Position: Sitting)   Pulse 66   Resp 18   Ht 5' 5" (1.651 m)   Wt 80.7 kg (178 lb)   SpO2 96%   BMI 29.62 kg/m²   Physical Exam    General: Alert and oriented, No acute distress.   Eye: Normal conjunctiva without exudate.  HENMT: Normocephalic/AT, Normal hearing, Oral mucosa is moist and pink   Neck: No goiter visualized.   Respiratory: Lungs CTAB, Respirations are non-labored, Breath sounds are equal, Symmetrical chest wall expansion.  Cardiovascular: Normal rate, Regular rhythm, No murmur, No edema.   Gastrointestinal: Non-distended.   Genitourinary: Deferred.  Musculoskeletal: Normal ROM, Normal gait, No deformities or amputations.  Integumentary: Warm, Dry, Intact. No diaphoresis, or flushing.  Neurologic: No focal deficits, Cranial Nerves II-XII are grossly intact.   Psychiatric: Cooperative, Appropriate mood & affect, Normal judgment, Non-suicidal.        No flowsheet data found.  Fall Risk " "Assessment - Outpatient 2/7/2023 1/5/2023 11/16/2022 10/10/2022   Mobility Status Ambulatory Wheelchair Bound Wheelchair Bound Wheelchair Bound   Number of falls 0 0 1 with injury 1 with injury   Identified as fall risk 0 1 1 1           Depression Screening  Over the past two weeks, has the patient felt down, depressed, or hopeless?: No  Over the past two weeks, has the patient felt little interest or pleasure in doing things?: No  Functional Ability/Safety Screening  Was the patient's timed Up & Go test unsteady or longer than 30 seconds?: No  Does the patient need help with phone, transportation, shopping, preparing meals, housework, laundry, meds, or managing money?: No  Does the patient's home have rugs in the hallway, lack grab bars in the bathroom, lack handrails on the stairs or have poor lighting?: No  Have you noticed any hearing difficulties?: No  A "Yes" response to any of the above questions should trigger further investigation.: 0  Cognitive Function (Assessed through direct observation with due consideration of information obtained by way of patient reports and/or concerns raised by family, friends, caretakers, or others)    Does the patient repeat questions/statements in the same day?: No  Does the patient have trouble remembering the date, year, and time?: No  Does the patient have difficulty managing finances?: No  Does the patient have a decreased sense of direction?: No  A "Yes" response to any of the above questions could indicate mild cognitive impairment and should trigger further investigation.: 0    Assessment/Plan:       1. Annual physical exam    2. Atrial flutter, unspecified type    3. Aortic atherosclerosis    4. Closed displaced comminuted fracture of shaft of right humerus, initial encounter    5. Osteoporosis, unspecified osteoporosis type, unspecified pathological fracture presence         Plan:  Labs wnl  Cpmm  Screening uptodate  Dexa scan  Cont oac, rate controlled  Rtc 1 " year      Education and counseling done face to face regarding medical conditions and plan. Contact office if new symptoms develop. Should any symptoms ever significantly worsen seek emergency medical attention/go to ER. Follow up at least yearly for wellness or sooner PRN. Nurse to call patient with any results. The patient is receptive, expresses understanding and is agreeable to plan. All questions have been answered.    No follow-ups on file.      Medicare Annual Wellness and Personalized Prevention Plan:   Fall Risk + Home Safety + Hearing Impairment + Depression Screen + Cognitive Impairment Screen + Health Risk Assessment all reviewed.    Advance Care Planning   I attest to discussing Advance Care Planning with patient and/or family member.  Education was provided including the importance of the Health Care Power of , Advance Directives, and/or [place] documentation.  The patient expressed understanding to the importance of this information and discussion.  Length of ACP conversation in minutes:          Opioid Screening: Patient medication list reviewed, patient is not taking prescription opioids. Patient is not using additional opioids than prescribed. Patient is at low risk of substance abuse based on this opioid use history.

## 2023-03-06 ENCOUNTER — HOSPITAL ENCOUNTER (OUTPATIENT)
Dept: RADIOLOGY | Facility: CLINIC | Age: 83
Discharge: HOME | End: 2023-03-06
Attending: ORTHOPAEDIC SURGERY
Payer: MEDICARE

## 2023-03-06 ENCOUNTER — OFFICE VISIT (OUTPATIENT)
Dept: ORTHOPEDICS | Facility: CLINIC | Age: 83
End: 2023-03-06
Payer: MEDICARE

## 2023-03-06 VITALS
SYSTOLIC BLOOD PRESSURE: 149 MMHG | HEART RATE: 59 BPM | HEIGHT: 65 IN | BODY MASS INDEX: 29.66 KG/M2 | TEMPERATURE: 98 F | WEIGHT: 178 LBS | DIASTOLIC BLOOD PRESSURE: 72 MMHG

## 2023-03-06 DIAGNOSIS — M25.511 ACUTE PAIN OF RIGHT SHOULDER: ICD-10-CM

## 2023-03-06 DIAGNOSIS — S42.352D CLOSED DISPLACED COMMINUTED FRACTURE OF SHAFT OF LEFT HUMERUS WITH ROUTINE HEALING, SUBSEQUENT ENCOUNTER: ICD-10-CM

## 2023-03-06 DIAGNOSIS — S42.352D CLOSED DISPLACED COMMINUTED FRACTURE OF SHAFT OF LEFT HUMERUS WITH ROUTINE HEALING, SUBSEQUENT ENCOUNTER: Primary | ICD-10-CM

## 2023-03-06 DIAGNOSIS — M75.41 IMPINGEMENT SYNDROME OF RIGHT SHOULDER: ICD-10-CM

## 2023-03-06 PROCEDURE — 73060 X-RAY EXAM OF HUMERUS: CPT | Mod: LT,,, | Performed by: ORTHOPAEDIC SURGERY

## 2023-03-06 PROCEDURE — 99214 PR OFFICE/OUTPT VISIT, EST, LEVL IV, 30-39 MIN: ICD-10-PCS | Mod: ,,, | Performed by: ORTHOPAEDIC SURGERY

## 2023-03-06 PROCEDURE — 73030 X-RAY EXAM OF SHOULDER: CPT | Mod: RT,,, | Performed by: ORTHOPAEDIC SURGERY

## 2023-03-06 PROCEDURE — 73030 XR SHOULDER COMPLETE 2 OR MORE VIEWS RIGHT: ICD-10-PCS | Mod: RT,,, | Performed by: ORTHOPAEDIC SURGERY

## 2023-03-06 PROCEDURE — 99214 OFFICE O/P EST MOD 30 MIN: CPT | Mod: ,,, | Performed by: ORTHOPAEDIC SURGERY

## 2023-03-06 PROCEDURE — 73060 XR HUMERUS 2 VIEW LEFT: ICD-10-PCS | Mod: LT,,, | Performed by: ORTHOPAEDIC SURGERY

## 2023-03-06 NOTE — PROGRESS NOTES
"Subjective:       Patient ID: Sumaya Moss is a 82 y.o. female.  Chief Complaint   Patient presents with    Left Upper Arm - Follow-up     4.5 month f/u from orif left humerus shaft fx 10/26/22. Complains of discomfort with ROM. Increase of pain in left shoulder pain.          HPI:  Patient is 4-1/2 months follow-up from a left humeral nail which he is doing well.  She has no complaint of discomfort.  Her only complaint is her right shoulder the contralateral shoulder has dull achy pain worse with cross-body motion it is 6/10 severe takes over-the-counter pain medication.  Denies numbness tingling.  States she has difficulty putting her clothes on with this symptoms.    ROS:  Constitutional: Denies fever chills  Eyes: No change in vision  ENT: No ringing or current infections  CV: No chest pain  Resp: No labored breathing  MSK: Pain evident at site of injury located in HPI,   Integ: No signs of abrasions or lacerations  Neuro: No numbness or tingling  Lymphatic: No swelling outside the area of injury     Current Outpatient Medications on File Prior to Visit   Medication Sig Dispense Refill    atorvastatin (LIPITOR) 10 MG tablet Take 10 mg by mouth once daily.      doxazosin (CARDURA) 4 MG tablet Take 4 mg by mouth once daily.      flecainide (TAMBOCOR) 50 MG Tab Take 50 mg by mouth 2 (two) times a day.      losartan (COZAAR) 50 MG tablet Take 50 mg by mouth 2 (two) times a day.      metoprolol succinate (TOPROL-XL) 100 MG 24 hr tablet 100 mg once daily.      vitamin D (VITAMIN D3) 1000 units Tab Take 1,000 Units by mouth once daily.      XARELTO 20 mg Tab Take 20 mg by mouth once daily.       No current facility-administered medications on file prior to visit.          Objective:      BP (!) 149/72   Pulse (!) 59   Temp 98 °F (36.7 °C)   Ht 5' 5" (1.651 m)   Wt 80.7 kg (178 lb)   BMI 29.62 kg/m²   General the patient is alert and oriented x3 no acute distress nontoxic-appearing appropriate " affect.    Constitutional: Vital signs are examined and stable.  Resp: No signs of labored breathing    LUE: --Skin: No signs of new abrasions or lacerations, no scars           -MSK: STR 5/5 AIN/PIN/Median/Radial/Ulnar motor           -Neuro:  Sensation intact to light touch C5-T1 dermatomes           -Lymphatic: No signs of lymphadenopathy, No signs of swelling,           -CV:Capillary refill is less than 2 seconds. Radial and ulnar pulses 2/4. Compartments Soft and compressible            RUE: -Skin: No signs of new abrasions or lacerations, no scars           -MSK: STR 5/5 AIN/PIN/Median/Radial/Ulnar motor,  Pain in the anterior medial aspect of the acromion with cross-body motion.           -Neuro:  Sensation intact to light touch C5-T1 dermatomes           -Lymphatic: No signs of  lymphadenopathy,            -CV:  Capillary refill is less than 2 seconds. Radial and ulnar pulses 2/4. Compartments soft and compressible               Body mass index is 29.62 kg/m².  Ideal body weight: 57 kg (125 lb 10.6 oz)  Adjusted ideal body weight: 66.5 kg (146 lb 9.6 oz)  No results found for: HGBA1C  Hgb   Date Value Ref Range Status   01/31/2023 13.5 12.0 - 16.0 gm/dL Final   10/17/2022 9.7 (L) 12.0 - 16.0 gm/dL Final     Vit D 25 OH   Date Value Ref Range Status   01/31/2023 28.4 (L) 30.0 - 80.0 ng/mL Final     WBC   Date Value Ref Range Status   01/31/2023 8.3 4.5 - 11.5 x10(3)/mcL Final   10/17/2022 12.5 (H) 4.5 - 11.5 x10(3)/mcL Final       Radiology:   Two views left humerus skeletally mature individual show healed fracture of the left humeral shaft proximally 80%.  No sign of hardware failure.    Two views of the right shoulder skeletally mature individual showing no sign of osteoarthritic changes that are severe.  Glenohumeral joint appears intact.  No sign of high-riding humeral head.        Assessment:         1. Closed displaced comminuted fracture of shaft of left humerus with routine healing, subsequent  encounter  X-Ray Humerus 2 View Left      2. Acute pain of right shoulder  X-ray Shoulder 2 or More Views Right              Plan:         No follow-ups on file.    Sumaya was seen today for follow-up.    Diagnoses and all orders for this visit:    Closed displaced comminuted fracture of shaft of left humerus with routine healing, subsequent encounter  -     X-Ray Humerus 2 View Left; Future    Acute pain of right shoulder  -     X-ray Shoulder 2 or More Views Right; Future      Patient has completely recovered from her left humeral nail.  She is still having right shoulder pain worse with cross-body motions.  In tenderness in the anterior medial aspect of the shoulder.  Concerned that she may have impingement syndrome.  She is open to injections.  We will place her in physical therapy have her follow up with 1 of my sports partners.  I discussed this case with Dr. Burkett who is agreed to see her at his next available appointment.        This note/OR report was created with the assistance of  voice recognition software or phone  dictation.  There may be transcription errors as a result of using this technology however minimal. Effort has been made to assure accuracy of transcription but any obvious errors or omissions should be clarified with the author of the document.     Doc Scherer DO  Orthopedic Trauma Surgery  03/06/2023      Future Appointments   Date Time Provider Department Center   2/12/2024  9:20 AM Benedict Jackson II, MD [facility]

## 2023-03-15 ENCOUNTER — OFFICE VISIT (OUTPATIENT)
Dept: ORTHOPEDICS | Facility: CLINIC | Age: 83
End: 2023-03-15
Payer: MEDICARE

## 2023-03-15 VITALS
HEART RATE: 76 BPM | HEIGHT: 65 IN | WEIGHT: 178 LBS | DIASTOLIC BLOOD PRESSURE: 81 MMHG | SYSTOLIC BLOOD PRESSURE: 149 MMHG | BODY MASS INDEX: 29.66 KG/M2

## 2023-03-15 DIAGNOSIS — M75.81 ROTATOR CUFF TENDINITIS, RIGHT: Primary | ICD-10-CM

## 2023-03-15 PROCEDURE — 20610 LARGE JOINT ASPIRATION/INJECTION: R SUBACROMIAL BURSA: ICD-10-PCS | Mod: RT,,, | Performed by: REHABILITATION UNIT

## 2023-03-15 PROCEDURE — 99214 OFFICE O/P EST MOD 30 MIN: CPT | Mod: 25,,, | Performed by: REHABILITATION UNIT

## 2023-03-15 PROCEDURE — 99214 PR OFFICE/OUTPT VISIT, EST, LEVL IV, 30-39 MIN: ICD-10-PCS | Mod: 25,,, | Performed by: REHABILITATION UNIT

## 2023-03-15 PROCEDURE — 20610 DRAIN/INJ JOINT/BURSA W/O US: CPT | Mod: RT,,, | Performed by: REHABILITATION UNIT

## 2023-03-15 RX ORDER — MELOXICAM 15 MG/1
15 TABLET ORAL DAILY
Qty: 30 TABLET | Refills: 0 | Status: ON HOLD | OUTPATIENT
Start: 2023-03-15 | End: 2023-07-09 | Stop reason: HOSPADM

## 2023-03-15 RX ORDER — BETAMETHASONE SODIUM PHOSPHATE AND BETAMETHASONE ACETATE 3; 3 MG/ML; MG/ML
6 INJECTION, SUSPENSION INTRA-ARTICULAR; INTRALESIONAL; INTRAMUSCULAR; SOFT TISSUE
Status: DISCONTINUED | OUTPATIENT
Start: 2023-03-15 | End: 2023-03-15 | Stop reason: HOSPADM

## 2023-03-15 RX ORDER — LIDOCAINE HYDROCHLORIDE 20 MG/ML
2 INJECTION, SOLUTION INFILTRATION; PERINEURAL
Status: DISCONTINUED | OUTPATIENT
Start: 2023-03-15 | End: 2023-03-15 | Stop reason: HOSPADM

## 2023-03-15 RX ADMIN — LIDOCAINE HYDROCHLORIDE 2 MG: 20 INJECTION, SOLUTION INFILTRATION; PERINEURAL at 08:03

## 2023-03-15 RX ADMIN — BETAMETHASONE SODIUM PHOSPHATE AND BETAMETHASONE ACETATE 6 MG: 3; 3 INJECTION, SUSPENSION INTRA-ARTICULAR; INTRALESIONAL; INTRAMUSCULAR; SOFT TISSUE at 08:03

## 2023-03-15 NOTE — PROGRESS NOTES
Subjective:      Patient ID: Sumaya Moss is a 82 y.o. female.    Chief Complaint: Pain (Ref from Dr. Scherer, Rt shoulder pain, Previous ORIF L humerus, states pain has been going on since she broke left arm, states pain is about the same, has no increase or decrease in pain, )    HPI:   Sumaya Moss is a 82 y.o. female who presents today for evaluation of her right shoulder.  She has previously being seen by me in ultimately had her left humerus fixed with Dr. Singh murray in October.  She is had right shoulder pain for an extended period of time.  Pain is diffusely about the shoulder and associated with pain with motion and some weakness.  She has not had any workup or intervention.  She just completed physical therapy for her left upper extremity.  She reports a global weakness and deconditioning following her injury.  She is in a wheelchair today which she uses for long distances.  She does sometimes use a walker around the house for shorter distances.  She also reports some back pain and some knee pain as well.  She is not taking any medicines for this.  No sensory or motor changes reported distally.    Past Medical History:   Diagnosis Date    Coronary artery disease     Fractures     Hypercholesterolemia     Hypertension     Respiratory distress      Past Surgical History:   Procedure Laterality Date    ABLATION      BRAIN SURGERY      aneurysm    COLONOSCOPY      HYSTERECTOMY      INTRAMEDULLARY RODDING OF HUMERUS Left 10/26/2022    Procedure: INSERTION, INTRAMEDULLARY LEATHA, HUMERUS;  Surgeon: Doc Scherer DO;  Location: [facility];  Service: Orthopedics;  Laterality: Left;     Social History     Socioeconomic History    Marital status:    Tobacco Use    Smoking status: Never     Passive exposure: Never    Smokeless tobacco: Never   Substance and Sexual Activity    Alcohol use: Never    Drug use: Never    Sexual activity: Yes   Social History Narrative    ** Merged History Encounter **       "        Current Outpatient Medications:     atorvastatin (LIPITOR) 10 MG tablet, Take 10 mg by mouth once daily., Disp: , Rfl:     doxazosin (CARDURA) 4 MG tablet, Take 4 mg by mouth once daily., Disp: , Rfl:     flecainide (TAMBOCOR) 50 MG Tab, Take 50 mg by mouth 2 (two) times a day., Disp: , Rfl:     losartan (COZAAR) 50 MG tablet, Take 50 mg by mouth 2 (two) times a day., Disp: , Rfl:     metoprolol succinate (TOPROL-XL) 100 MG 24 hr tablet, 100 mg once daily., Disp: , Rfl:     vitamin D (VITAMIN D3) 1000 units Tab, Take 1,000 Units by mouth once daily., Disp: , Rfl:     XARELTO 20 mg Tab, Take 20 mg by mouth once daily., Disp: , Rfl:   Review of patient's allergies indicates:  No Known Allergies    BP (!) 149/81   Pulse 76   Ht 5' 5" (1.651 m)   Wt 80.7 kg (178 lb)   BMI 29.62 kg/m²     Comprehensive review of systems completed and negative except as per HPI.        Objective:   Head: Normocephalic, without obvious abnormality, atraumatic  Eyes: conjunctivae/corneas clear. EOM's intact  Ears: normal external appearance  Nose: Nares normal. Septum midline. Mucosa normal. No drainage  Throat: normal findings: lips normal without lesions  Lungs: unlabored breathing on room air  Chest wall: symmetric chest rise  Heart: regular rate and rhythm  Pulses: 2+ and symmetric  Skin: Skin color, texture, turgor normal. No rashes or lesions  Neurologic: Grossly normal    right SHOULDER    Appearance:   normal    Cervical Spine:   No ttp; full, painless ROM; negative Spurlings    Tenderness:   Anterior    AROM:   , Abduction 170, ER 80, IR sacrum    PROM:  full    Pain:  AROM: Negative  PROM:  Negative  End ROM: Negative  Supraspinatus strength testing: Negative  External rotation strength testing: Negative  Vee-scapular: Negative  Virtually all provacative maneuvers Negative    Strength:  Supraspinatus: intact but weak  External rotation: intact but weak  Vee-scapular: intact    Provocative Maneuvers:     Rotator " Cuff/Biceps/AC Joint  Neer's Sign: Positive  Hawkin's Test: Negative  Painful arc: Negative  Belly Press: Negative  Bear Hugger Test: Negative  Hornblower's Sign: Negative  Speed's Test: Positive  Cross Arm Abduction: Positive    Pulses: Palpable radial pulse    Neurological deficits: None    The patient has a warm and well-perfused upper extremity with capillary refill less than 2 seconds. Sensation is intact to light touch in terminal nerve distributions. 5/5 ain/pin/uln. The patient has no palpable epitrochlear lymphadenopathy.      Assessment:     Imaging:   Four view radiographs of the right shoulder obtained today personally reviewed showing no acute fractures or dislocations.  Joint spaces are well maintained.  Humeral head remains seated centrally within the glenoid.    Large Joint Aspiration/Injection: R subacromial bursa    Date/Time: 3/15/2023 8:15 AM  Performed by: Rolf Burkett MD  Authorized by: Rolf Burkett MD     Consent Done?:  Yes (Verbal)  Indications:  Pain  Site marked: the procedure site was marked    Timeout: prior to procedure the correct patient, procedure, and site was verified    Prep: patient was prepped and draped in usual sterile fashion    Local anesthesia used?: No      Details:  Needle Size:  21 G  Ultrasonic Guidance for needle placement?: No    Approach:  Posterior  Location:  Shoulder  Site:  R subacromial bursa  Medications:  2 mg LIDOcaine HCL 20 mg/ml (2%) 20 mg/mL (2 %); 6 mg betamethasone acetate-betamethasone sodium phosphate 6 mg/mL  Patient tolerance:  Patient tolerated the procedure well with no immediate complications        1. Rotator cuff tendinitis, right          Plan:          Imaging and exam findings discussed with the patient.  She has chronic right shoulder pain.  Overall her motion is full but she does have some weakness.  We discussed options and will start with a steroid injection which was provided as above and she tolerated this well.  We will also  start on a low dose anti-inflammatory.  Referred back to physical therapy to work on her right shoulder and strengthening for her global deconditioning.  I will see her back on an as-needed basis.  She may elect to have knee radiographs on return and workup at that time.  All of her questions were answered and she was in agreement with the plan.

## 2023-05-24 ENCOUNTER — LAB VISIT (OUTPATIENT)
Dept: LAB | Facility: HOSPITAL | Age: 83
End: 2023-05-24
Attending: INTERNAL MEDICINE
Payer: MEDICARE

## 2023-05-24 DIAGNOSIS — I48.92 ATRIAL FLUTTER: Primary | ICD-10-CM

## 2023-05-24 PROCEDURE — 80181 DRUG ASSAY FLECAINIDE: CPT

## 2023-05-24 PROCEDURE — 36415 COLL VENOUS BLD VENIPUNCTURE: CPT

## 2023-05-26 LAB — MAYO GENERIC ORDERABLE RESULT: NORMAL

## 2023-07-01 ENCOUNTER — HOSPITAL ENCOUNTER (EMERGENCY)
Facility: HOSPITAL | Age: 83
Discharge: HOME | End: 2023-07-01
Attending: EMERGENCY MEDICINE
Payer: MEDICARE

## 2023-07-01 VITALS
WEIGHT: 170 LBS | HEART RATE: 85 BPM | OXYGEN SATURATION: 96 % | DIASTOLIC BLOOD PRESSURE: 81 MMHG | HEIGHT: 65 IN | SYSTOLIC BLOOD PRESSURE: 153 MMHG | RESPIRATION RATE: 19 BRPM | TEMPERATURE: 98 F | BODY MASS INDEX: 28.32 KG/M2

## 2023-07-01 DIAGNOSIS — N20.1 LEFT URETERAL STONE: Primary | ICD-10-CM

## 2023-07-01 DIAGNOSIS — R10.9 ACUTE LEFT FLANK PAIN: ICD-10-CM

## 2023-07-01 LAB
ALBUMIN SERPL-MCNC: 3.6 G/DL (ref 3.4–4.8)
ALBUMIN/GLOB SERPL: 1.1 RATIO (ref 1.1–2)
ALP SERPL-CCNC: 128 UNIT/L (ref 40–150)
ALT SERPL-CCNC: 13 UNIT/L (ref 0–55)
APPEARANCE UR: CLEAR
AST SERPL-CCNC: 13 UNIT/L (ref 5–34)
BACTERIA #/AREA URNS AUTO: ABNORMAL /HPF
BASOPHILS # BLD AUTO: 0.03 X10(3)/MCL
BASOPHILS NFR BLD AUTO: 0.3 %
BILIRUB UR QL STRIP.AUTO: NEGATIVE MG/DL
BILIRUBIN DIRECT+TOT PNL SERPL-MCNC: 0.5 MG/DL
BUN SERPL-MCNC: 24.1 MG/DL (ref 9.8–20.1)
CALCIUM SERPL-MCNC: 9.4 MG/DL (ref 8.4–10.2)
CHLORIDE SERPL-SCNC: 106 MMOL/L (ref 98–107)
CO2 SERPL-SCNC: 24 MMOL/L (ref 23–31)
COLOR UR: ABNORMAL
CREAT SERPL-MCNC: 0.9 MG/DL (ref 0.55–1.02)
EOSINOPHIL # BLD AUTO: 0.09 X10(3)/MCL (ref 0–0.9)
EOSINOPHIL NFR BLD AUTO: 1 %
ERYTHROCYTE [DISTWIDTH] IN BLOOD BY AUTOMATED COUNT: 13.1 % (ref 11.5–17)
GFR SERPLBLD CREATININE-BSD FMLA CKD-EPI: >60 MLS/MIN/1.73/M2
GLOBULIN SER-MCNC: 3.4 GM/DL (ref 2.4–3.5)
GLUCOSE SERPL-MCNC: 104 MG/DL (ref 82–115)
GLUCOSE UR QL STRIP.AUTO: NEGATIVE MG/DL
HCT VFR BLD AUTO: 40.9 % (ref 37–47)
HGB BLD-MCNC: 13.5 G/DL (ref 12–16)
IMM GRANULOCYTES # BLD AUTO: 0.03 X10(3)/MCL (ref 0–0.04)
IMM GRANULOCYTES NFR BLD AUTO: 0.3 %
KETONES UR QL STRIP.AUTO: NEGATIVE MG/DL
LEUKOCYTE ESTERASE UR QL STRIP.AUTO: ABNORMAL UNIT/L
LIPASE SERPL-CCNC: 21 U/L
LYMPHOCYTES # BLD AUTO: 1.42 X10(3)/MCL (ref 0.6–4.6)
LYMPHOCYTES NFR BLD AUTO: 15.4 %
MCH RBC QN AUTO: 32.1 PG (ref 27–31)
MCHC RBC AUTO-ENTMCNC: 33 G/DL (ref 33–36)
MCV RBC AUTO: 97.1 FL (ref 80–94)
MONOCYTES # BLD AUTO: 0.84 X10(3)/MCL (ref 0.1–1.3)
MONOCYTES NFR BLD AUTO: 9.1 %
NEUTROPHILS # BLD AUTO: 6.83 X10(3)/MCL (ref 2.1–9.2)
NEUTROPHILS NFR BLD AUTO: 73.9 %
NITRITE UR QL STRIP.AUTO: NEGATIVE
NRBC BLD AUTO-RTO: 0 %
PH UR STRIP.AUTO: 7.5 [PH]
PLATELET # BLD AUTO: 160 X10(3)/MCL (ref 130–400)
PMV BLD AUTO: 9.7 FL (ref 7.4–10.4)
POTASSIUM SERPL-SCNC: 4.1 MMOL/L (ref 3.5–5.1)
PROT SERPL-MCNC: 7 GM/DL (ref 5.8–7.6)
PROT UR QL STRIP.AUTO: NEGATIVE MG/DL
RBC # BLD AUTO: 4.21 X10(6)/MCL (ref 4.2–5.4)
RBC #/AREA URNS AUTO: 510 /HPF
RBC UR QL AUTO: ABNORMAL UNIT/L
SODIUM SERPL-SCNC: 138 MMOL/L (ref 136–145)
SP GR UR STRIP.AUTO: 1.01 (ref 1–1.03)
SQUAMOUS #/AREA URNS AUTO: <5 /HPF
UROBILINOGEN UR STRIP-ACNC: 0.2 MG/DL
WBC # SPEC AUTO: 9.24 X10(3)/MCL (ref 4.5–11.5)
WBC #/AREA URNS AUTO: <5 /HPF

## 2023-07-01 PROCEDURE — 81001 URINALYSIS AUTO W/SCOPE: CPT | Performed by: EMERGENCY MEDICINE

## 2023-07-01 PROCEDURE — 63600175 PHARM REV CODE 636 W HCPCS: Performed by: EMERGENCY MEDICINE

## 2023-07-01 PROCEDURE — 99285 EMERGENCY DEPT VISIT HI MDM: CPT | Mod: 25

## 2023-07-01 PROCEDURE — 25000003 PHARM REV CODE 250: Performed by: EMERGENCY MEDICINE

## 2023-07-01 PROCEDURE — 96372 THER/PROPH/DIAG INJ SC/IM: CPT | Performed by: EMERGENCY MEDICINE

## 2023-07-01 PROCEDURE — 96374 THER/PROPH/DIAG INJ IV PUSH: CPT

## 2023-07-01 PROCEDURE — 25500020 PHARM REV CODE 255: Performed by: EMERGENCY MEDICINE

## 2023-07-01 PROCEDURE — 85025 COMPLETE CBC W/AUTO DIFF WBC: CPT | Performed by: EMERGENCY MEDICINE

## 2023-07-01 PROCEDURE — 83690 ASSAY OF LIPASE: CPT | Performed by: EMERGENCY MEDICINE

## 2023-07-01 PROCEDURE — 96361 HYDRATE IV INFUSION ADD-ON: CPT

## 2023-07-01 PROCEDURE — 96375 TX/PRO/DX INJ NEW DRUG ADDON: CPT

## 2023-07-01 PROCEDURE — 80053 COMPREHEN METABOLIC PANEL: CPT | Performed by: EMERGENCY MEDICINE

## 2023-07-01 RX ORDER — ONDANSETRON 4 MG/1
4 TABLET, ORALLY DISINTEGRATING ORAL EVERY 6 HOURS PRN
Qty: 20 TABLET | Refills: 0 | Status: SHIPPED | OUTPATIENT
Start: 2023-07-01 | End: 2024-02-23

## 2023-07-01 RX ORDER — ONDANSETRON 2 MG/ML
4 INJECTION INTRAMUSCULAR; INTRAVENOUS
Status: COMPLETED | OUTPATIENT
Start: 2023-07-01 | End: 2023-07-01

## 2023-07-01 RX ORDER — KETOROLAC TROMETHAMINE 10 MG/1
10 TABLET, FILM COATED ORAL EVERY 8 HOURS PRN
Qty: 15 TABLET | Refills: 0 | Status: ON HOLD | OUTPATIENT
Start: 2023-07-01 | End: 2023-07-09 | Stop reason: HOSPADM

## 2023-07-01 RX ORDER — TAMSULOSIN HYDROCHLORIDE 0.4 MG/1
0.4 CAPSULE ORAL DAILY
Qty: 14 CAPSULE | Refills: 0 | Status: SHIPPED | OUTPATIENT
Start: 2023-07-01 | End: 2024-02-23

## 2023-07-01 RX ORDER — KETOROLAC TROMETHAMINE 30 MG/ML
15 INJECTION, SOLUTION INTRAMUSCULAR; INTRAVENOUS
Status: COMPLETED | OUTPATIENT
Start: 2023-07-01 | End: 2023-07-01

## 2023-07-01 RX ORDER — DICYCLOMINE HYDROCHLORIDE 10 MG/ML
20 INJECTION INTRAMUSCULAR
Status: COMPLETED | OUTPATIENT
Start: 2023-07-01 | End: 2023-07-01

## 2023-07-01 RX ORDER — HYDROCODONE BITARTRATE AND ACETAMINOPHEN 5; 325 MG/1; MG/1
1 TABLET ORAL EVERY 4 HOURS PRN
Qty: 12 TABLET | Refills: 0 | Status: ON HOLD | OUTPATIENT
Start: 2023-07-01 | End: 2023-07-09 | Stop reason: HOSPADM

## 2023-07-01 RX ADMIN — SODIUM CHLORIDE 1000 ML: 9 INJECTION, SOLUTION INTRAVENOUS at 05:07

## 2023-07-01 RX ADMIN — DICYCLOMINE HYDROCHLORIDE 20 MG: 20 INJECTION, SOLUTION INTRAMUSCULAR at 04:07

## 2023-07-01 RX ADMIN — IOPAMIDOL 100 ML: 755 INJECTION, SOLUTION INTRAVENOUS at 05:07

## 2023-07-01 RX ADMIN — ONDANSETRON 4 MG: 2 INJECTION INTRAMUSCULAR; INTRAVENOUS at 04:07

## 2023-07-01 RX ADMIN — KETOROLAC TROMETHAMINE 15 MG: 30 INJECTION, SOLUTION INTRAMUSCULAR; INTRAVENOUS at 07:07

## 2023-07-01 NOTE — ED PROVIDER NOTES
Encounter Date: 7/1/2023    SCRIBE #1 NOTE: I, Elsie Michelle, am scribing for, and in the presence of,  Mary Newman MD. I have scribed the following portions of the note - Other sections scribed: HPI, ROS, PE.     History     Chief Complaint   Patient presents with    Abdominal Pain     Pt c/o abd pain, left flank pain, vomiting. Denies diarrhea.     83 y/o female with PMH of CAD, hysterectomy, hypercholesterolemia, and HTN presents to ED for constant, left sided abdominal pain that radiates to left flank onset 3 hours ago. States the pain feels similar to abdominal cramps, but worse. She states her first BM was normal, but her second BM had loose, but solid stool. She also reports associated nausea and vomiting phlegm, but denies fever and any problems with urination.    The history is provided by the patient. No  was used.   Review of patient's allergies indicates:  No Known Allergies  Past Medical History:   Diagnosis Date    Coronary artery disease     Fractures     Hypercholesterolemia     Hypertension     Respiratory distress      Past Surgical History:   Procedure Laterality Date    ABLATION      BRAIN SURGERY      aneurysm    COLONOSCOPY      HYSTERECTOMY      INTRAMEDULLARY RODDING OF HUMERUS Left 10/26/2022    Procedure: INSERTION, INTRAMEDULLARY LEATHA, HUMERUS;  Surgeon: Doc Scherer DO;  Location: [facility] OR;  Service: Orthopedics;  Laterality: Left;     Family History   Problem Relation Age of Onset    No Known Problems Mother     No Known Problems Father      Social History     Tobacco Use    Smoking status: Never     Passive exposure: Never    Smokeless tobacco: Never   Substance Use Topics    Alcohol use: Never    Drug use: Never     Review of Systems   Constitutional:  Negative for chills, diaphoresis and fever.   HENT:  Negative for congestion and sore throat.    Eyes:  Negative for visual disturbance.   Respiratory:  Negative for cough and shortness of breath.    Cardiovascular:   Negative for chest pain and palpitations.   Gastrointestinal:  Positive for abdominal pain (left side), nausea and vomiting. Negative for diarrhea.   Genitourinary:  Positive for flank pain (left). Negative for dysuria and hematuria.   Skin:  Negative for rash.   Neurological:  Negative for syncope, weakness, numbness and headaches.   All other systems reviewed and are negative.    Physical Exam     Initial Vitals [07/01/23 0308]   BP Pulse Resp Temp SpO2   (!) 179/80 (!) 58 14 97.5 °F (36.4 °C) 96 %      MAP       --         Physical Exam    Nursing note and vitals reviewed.  Constitutional: She appears well-developed and well-nourished. She is not diaphoretic. She does not appear ill. No distress.   HENT:   Head: Normocephalic and atraumatic.   Right Ear: External ear normal.   Left Ear: External ear normal.   Mouth/Throat: Oropharynx is clear and moist.   Eyes: Conjunctivae are normal.   Neck: Neck supple. No tracheal deviation present.   Cardiovascular:  Normal rate, regular rhythm and normal heart sounds.           No murmur heard.  Pulmonary/Chest: Breath sounds normal. No respiratory distress. She has no wheezes. She has no rhonchi. She has no rales.   Abdominal: Abdomen is soft. She exhibits no distension. Bowel sounds are increased. There is abdominal tenderness in the left upper quadrant.   No right CVA tenderness.  There is left CVA tenderness.   Musculoskeletal:         General: Normal range of motion.      Cervical back: Neck supple.     Neurological: She is alert and oriented to person, place, and time. GCS score is 15. GCS eye subscore is 4. GCS verbal subscore is 5. GCS motor subscore is 6.   Skin: Skin is warm and dry. Capillary refill takes less than 2 seconds. No rash noted. No pallor.       ED Course   Procedures  Labs Reviewed   COMPREHENSIVE METABOLIC PANEL - Abnormal; Notable for the following components:       Result Value    Blood Urea Nitrogen 24.1 (*)     All other components within normal  limits   URINALYSIS, REFLEX TO URINE CULTURE - Abnormal; Notable for the following components:    Color, UA Orange (*)     Blood, UA 3+ (*)     Leukocyte Esterase, UA Trace (*)     All other components within normal limits   CBC WITH DIFFERENTIAL - Abnormal; Notable for the following components:    MCV 97.1 (*)     MCH 32.1 (*)     All other components within normal limits   URINALYSIS, MICROSCOPIC - Abnormal; Notable for the following components:    RBC,  (*)     All other components within normal limits   LIPASE - Normal   CBC W/ AUTO DIFFERENTIAL    Narrative:     The following orders were created for panel order CBC W/ AUTO DIFFERENTIAL.  Procedure                               Abnormality         Status                     ---------                               -----------         ------                     CBC with Differential[[id number]]        Abnormal            Final result                 Please view results for these tests on the individual orders.          Imaging Results              CT Abdomen Pelvis With Contrast (In process)                      Medications   ondansetron injection 4 mg (4 mg Intravenous Given 7/1/23 0406)   dicyclomine injection 20 mg (20 mg Intramuscular Given 7/1/23 0406)   sodium chloride 0.9% bolus 1,000 mL 1,000 mL (0 mLs Intravenous Stopped 7/1/23 0643)   iopamidoL (ISOVUE-370) injection 100 mL (100 mLs Intravenous Given 7/1/23 0554)   ketorolac injection 15 mg (15 mg Intravenous Given 7/1/23 0742)              Scribe Attestation:   Scribe #1: I performed the above scribed service and the documentation accurately describes the services I performed. I attest to the accuracy of the note.    Attending Attestation:           Physician Attestation for Scribe:  Physician Attestation Statement for Scribe #1: I, Mary Newman MD, reviewed documentation, as scribed by Elsie Michelle in my presence, and it is both accurate and complete.       Medical Decision Making  81 yo female  with left sided abdominal/flank pain.       The differential diagnosis includes, but is not limited to: hiatal hernia, gastritis, colitis, kidney stone, and bowel obstruction.    ED management:   Bentyl without improvement  Labs with hematuria, no UTI, no leukocytosis  CT with left ureteral stone  Pain nearly resolved with Toradol   Urology consulted and agrees with outpatient trial  Discussed plan and ED return precautions  Rx Norco, Toradol, Flomax, and zofran     Problems Addressed:  Acute left flank pain: acute illness or injury that poses a threat to life or bodily functions  Left ureteral stone: acute illness or injury that poses a threat to life or bodily functions    Amount and/or Complexity of Data Reviewed  Labs: ordered. Decision-making details documented in ED Course.  Radiology: ordered and independent interpretation performed. Decision-making details documented in ED Course.    Risk  Prescription drug management.            ED Course as of 07/01/23 1018   Sat Jul 01, 2023   0740 Reviewed radiology prelim- left-sided moderate hydronephrosis due to 5 mm obstructive stone at the left proximal ureter.  Moderate left perinephric fat stranding seen. [KM]   0837 Paged Urology. [MM]   0839 Pain improved significantly with Toradol  [KM]   0951 Urology consult: spoke with Dr Thomas- agrees with outpatient trial with low dose Toradol/Norco and Flomax, have patient call office Monday  [KM]      ED Course User Index  [KM] Mary Newman MD  [MM] Gilda Gan                 Clinical Impression:   Final diagnoses:  [R10.9] Acute left flank pain  [N20.1] Left ureteral stone (Primary)        ED Disposition Condition    Discharge Stable          ED Prescriptions       Medication Sig Dispense Start Date End Date Auth. Provider    ondansetron (ZOFRAN-ODT) 4 MG TbDL Take 1 tablet (4 mg total) by mouth every 6 (six) hours as needed (nausea/vomiting). 20 tablet 7/1/2023 -- Mary Newman MD    ketorolac (TORADOL) 10 mg  tablet Take 1 tablet (10 mg total) by mouth every 8 (eight) hours as needed for Pain. Do not take any other NSAIDs 15 tablet 7/1/2023 7/6/2023 Mary Newman MD    HYDROcodone-acetaminophen (NORCO) 5-325 mg per tablet Take 1 tablet by mouth every 4 (four) hours as needed for Pain. 12 tablet 7/1/2023 7/4/2023 Mary Newman MD    tamsulosin (FLOMAX) 0.4 mg Cap Take 1 capsule (0.4 mg total) by mouth once daily. for 14 days 14 capsule 7/1/2023 7/15/2023 Mary Newman MD          Follow-up Information       Follow up With Specialties Details Why Contact Info    Cory Thomas MD Urology Call on 7/3/2023  [address] [facility]  [address]  [facility] [address]  [phone]      [facility] - Emergency Dept Emergency Medicine  As needed, If symptoms worsen [address] [facility] [id number] [phone]             Mary Newman MD  07/01/23 1012

## 2023-07-05 ENCOUNTER — ANESTHESIA (OUTPATIENT)
Dept: SURGERY | Facility: HOSPITAL | Age: 83
DRG: 660 | End: 2023-07-05
Payer: MEDICARE

## 2023-07-05 ENCOUNTER — ANESTHESIA EVENT (OUTPATIENT)
Dept: SURGERY | Facility: HOSPITAL | Age: 83
DRG: 660 | End: 2023-07-05
Payer: MEDICARE

## 2023-07-05 ENCOUNTER — HOSPITAL ENCOUNTER (INPATIENT)
Facility: HOSPITAL | Age: 83
LOS: 4 days | Discharge: HOME | DRG: 660 | End: 2023-07-09
Attending: STUDENT IN AN ORGANIZED HEALTH CARE EDUCATION/TRAINING PROGRAM | Admitting: INTERNAL MEDICINE
Payer: MEDICARE

## 2023-07-05 DIAGNOSIS — N20.0 KIDNEY STONE: ICD-10-CM

## 2023-07-05 DIAGNOSIS — N17.9 AKI (ACUTE KIDNEY INJURY): ICD-10-CM

## 2023-07-05 DIAGNOSIS — N20.0 NEPHROLITHIASIS: ICD-10-CM

## 2023-07-05 DIAGNOSIS — R11.0 NAUSEA: ICD-10-CM

## 2023-07-05 DIAGNOSIS — R11.10 VOMITING, UNSPECIFIED VOMITING TYPE, UNSPECIFIED WHETHER NAUSEA PRESENT: ICD-10-CM

## 2023-07-05 DIAGNOSIS — R31.9 HEMATURIA, UNSPECIFIED TYPE: ICD-10-CM

## 2023-07-05 DIAGNOSIS — R10.9 LEFT FLANK PAIN: Primary | ICD-10-CM

## 2023-07-05 LAB
ALBUMIN SERPL-MCNC: 3.1 G/DL (ref 3.4–4.8)
ALBUMIN/GLOB SERPL: 1 RATIO (ref 1.1–2)
ALP SERPL-CCNC: 111 UNIT/L (ref 40–150)
ALT SERPL-CCNC: 9 UNIT/L (ref 0–55)
APPEARANCE UR: CLEAR
AST SERPL-CCNC: 17 UNIT/L (ref 5–34)
BACTERIA #/AREA URNS AUTO: ABNORMAL /HPF
BASOPHILS # BLD AUTO: 0.04 X10(3)/MCL
BASOPHILS NFR BLD AUTO: 0.3 %
BILIRUB UR QL STRIP.AUTO: NEGATIVE MG/DL
BILIRUBIN DIRECT+TOT PNL SERPL-MCNC: 0.6 MG/DL
BUN SERPL-MCNC: 26.1 MG/DL (ref 9.8–20.1)
CALCIUM SERPL-MCNC: 8.9 MG/DL (ref 8.4–10.2)
CHLORIDE SERPL-SCNC: 109 MMOL/L (ref 98–107)
CO2 SERPL-SCNC: 22 MMOL/L (ref 23–31)
COLOR UR: YELLOW
CREAT SERPL-MCNC: 1.27 MG/DL (ref 0.55–1.02)
EOSINOPHIL # BLD AUTO: 0.03 X10(3)/MCL (ref 0–0.9)
EOSINOPHIL NFR BLD AUTO: 0.2 %
ERYTHROCYTE [DISTWIDTH] IN BLOOD BY AUTOMATED COUNT: 13.3 % (ref 11.5–17)
GFR SERPLBLD CREATININE-BSD FMLA CKD-EPI: 43 MLS/MIN/1.73/M2
GLOBULIN SER-MCNC: 3.2 GM/DL (ref 2.4–3.5)
GLUCOSE SERPL-MCNC: 130 MG/DL (ref 82–115)
GLUCOSE UR QL STRIP.AUTO: NEGATIVE MG/DL
HCT VFR BLD AUTO: 36.2 % (ref 37–47)
HGB BLD-MCNC: 11.9 G/DL (ref 12–16)
IMM GRANULOCYTES # BLD AUTO: 0.14 X10(3)/MCL (ref 0–0.04)
IMM GRANULOCYTES NFR BLD AUTO: 1.1 %
KETONES UR QL STRIP.AUTO: ABNORMAL MG/DL
LEUKOCYTE ESTERASE UR QL STRIP.AUTO: ABNORMAL UNIT/L
LYMPHOCYTES # BLD AUTO: 1.27 X10(3)/MCL (ref 0.6–4.6)
LYMPHOCYTES NFR BLD AUTO: 9.8 %
MCH RBC QN AUTO: 31.7 PG (ref 27–31)
MCHC RBC AUTO-ENTMCNC: 32.9 G/DL (ref 33–36)
MCV RBC AUTO: 96.5 FL (ref 80–94)
MONOCYTES # BLD AUTO: 1.61 X10(3)/MCL (ref 0.1–1.3)
MONOCYTES NFR BLD AUTO: 12.5 %
NEUTROPHILS # BLD AUTO: 9.81 X10(3)/MCL (ref 2.1–9.2)
NEUTROPHILS NFR BLD AUTO: 76.1 %
NITRITE UR QL STRIP.AUTO: NEGATIVE
NRBC BLD AUTO-RTO: 0 %
PH UR STRIP.AUTO: 5 [PH]
PLATELET # BLD AUTO: 148 X10(3)/MCL (ref 130–400)
PMV BLD AUTO: 10.5 FL (ref 7.4–10.4)
POTASSIUM SERPL-SCNC: 5 MMOL/L (ref 3.5–5.1)
PROT SERPL-MCNC: 6.3 GM/DL (ref 5.8–7.6)
PROT UR QL STRIP.AUTO: NEGATIVE MG/DL
RBC # BLD AUTO: 3.75 X10(6)/MCL (ref 4.2–5.4)
RBC #/AREA URNS AUTO: 19 /HPF
RBC UR QL AUTO: ABNORMAL UNIT/L
SODIUM SERPL-SCNC: 137 MMOL/L (ref 136–145)
SP GR UR STRIP.AUTO: 1.02 (ref 1–1.03)
SQUAMOUS #/AREA URNS AUTO: <5 /HPF
UROBILINOGEN UR STRIP-ACNC: 0.2 MG/DL
WBC # SPEC AUTO: 12.9 X10(3)/MCL (ref 4.5–11.5)
WBC #/AREA URNS AUTO: <5 /HPF

## 2023-07-05 PROCEDURE — 25500020 PHARM REV CODE 255: Performed by: UROLOGY

## 2023-07-05 PROCEDURE — C1769 GUIDE WIRE: HCPCS | Performed by: UROLOGY

## 2023-07-05 PROCEDURE — 37000008 HC ANESTHESIA 1ST 15 MINUTES: Performed by: UROLOGY

## 2023-07-05 PROCEDURE — 63600175 PHARM REV CODE 636 W HCPCS

## 2023-07-05 PROCEDURE — C2617 STENT, NON-COR, TEM W/O DEL: HCPCS | Performed by: UROLOGY

## 2023-07-05 PROCEDURE — 27201423 OPTIME MED/SURG SUP & DEVICES STERILE SUPPLY: Performed by: UROLOGY

## 2023-07-05 PROCEDURE — 63600175 PHARM REV CODE 636 W HCPCS: Performed by: STUDENT IN AN ORGANIZED HEALTH CARE EDUCATION/TRAINING PROGRAM

## 2023-07-05 PROCEDURE — 99223 PR INITIAL HOSPITAL CARE,LEVL III: ICD-10-PCS | Mod: AI,,, | Performed by: INTERNAL MEDICINE

## 2023-07-05 PROCEDURE — 37000009 HC ANESTHESIA EA ADD 15 MINS: Performed by: UROLOGY

## 2023-07-05 PROCEDURE — 81001 URINALYSIS AUTO W/SCOPE: CPT | Performed by: STUDENT IN AN ORGANIZED HEALTH CARE EDUCATION/TRAINING PROGRAM

## 2023-07-05 PROCEDURE — 25000003 PHARM REV CODE 250: Performed by: INTERNAL MEDICINE

## 2023-07-05 PROCEDURE — 96361 HYDRATE IV INFUSION ADD-ON: CPT

## 2023-07-05 PROCEDURE — 11000001 HC ACUTE MED/SURG PRIVATE ROOM

## 2023-07-05 PROCEDURE — D9220A PRA ANESTHESIA: Mod: ,,,

## 2023-07-05 PROCEDURE — C1758 CATHETER, URETERAL: HCPCS | Performed by: UROLOGY

## 2023-07-05 PROCEDURE — 96375 TX/PRO/DX INJ NEW DRUG ADDON: CPT

## 2023-07-05 PROCEDURE — 36000706: Performed by: UROLOGY

## 2023-07-05 PROCEDURE — 63600175 PHARM REV CODE 636 W HCPCS: Performed by: INTERNAL MEDICINE

## 2023-07-05 PROCEDURE — D9220A PRA ANESTHESIA: ICD-10-PCS | Mod: ,,,

## 2023-07-05 PROCEDURE — 25000003 PHARM REV CODE 250

## 2023-07-05 PROCEDURE — 25000003 PHARM REV CODE 250: Performed by: UROLOGY

## 2023-07-05 PROCEDURE — 96376 TX/PRO/DX INJ SAME DRUG ADON: CPT

## 2023-07-05 PROCEDURE — 99223 1ST HOSP IP/OBS HIGH 75: CPT | Mod: AI,,, | Performed by: INTERNAL MEDICINE

## 2023-07-05 PROCEDURE — 85025 COMPLETE CBC W/AUTO DIFF WBC: CPT | Performed by: STUDENT IN AN ORGANIZED HEALTH CARE EDUCATION/TRAINING PROGRAM

## 2023-07-05 PROCEDURE — C1713 ANCHOR/SCREW BN/BN,TIS/BN: HCPCS | Performed by: UROLOGY

## 2023-07-05 PROCEDURE — 36000707: Performed by: UROLOGY

## 2023-07-05 PROCEDURE — 96374 THER/PROPH/DIAG INJ IV PUSH: CPT

## 2023-07-05 PROCEDURE — 80053 COMPREHEN METABOLIC PANEL: CPT | Performed by: STUDENT IN AN ORGANIZED HEALTH CARE EDUCATION/TRAINING PROGRAM

## 2023-07-05 PROCEDURE — 99285 EMERGENCY DEPT VISIT HI MDM: CPT | Mod: 25

## 2023-07-05 DEVICE — STENT SET URETERAL 6X24CM: Type: IMPLANTABLE DEVICE | Site: URETER | Status: FUNCTIONAL

## 2023-07-05 RX ORDER — KETOROLAC TROMETHAMINE 30 MG/ML
15 INJECTION, SOLUTION INTRAMUSCULAR; INTRAVENOUS
Status: COMPLETED | OUTPATIENT
Start: 2023-07-05 | End: 2023-07-05

## 2023-07-05 RX ORDER — DOXAZOSIN 1 MG/1
4 TABLET ORAL DAILY
Status: DISCONTINUED | OUTPATIENT
Start: 2023-07-06 | End: 2023-07-09 | Stop reason: HOSPADM

## 2023-07-05 RX ORDER — DEXAMETHASONE SODIUM PHOSPHATE 4 MG/ML
INJECTION, SOLUTION INTRA-ARTICULAR; INTRALESIONAL; INTRAMUSCULAR; INTRAVENOUS; SOFT TISSUE
Status: DISCONTINUED | OUTPATIENT
Start: 2023-07-05 | End: 2023-07-05

## 2023-07-05 RX ORDER — LIDOCAINE HYDROCHLORIDE 20 MG/ML
INJECTION, SOLUTION EPIDURAL; INFILTRATION; INTRACAUDAL; PERINEURAL
Status: DISCONTINUED | OUTPATIENT
Start: 2023-07-05 | End: 2023-07-05

## 2023-07-05 RX ORDER — MORPHINE SULFATE 4 MG/ML
4 INJECTION, SOLUTION INTRAMUSCULAR; INTRAVENOUS EVERY 4 HOURS PRN
Status: DISCONTINUED | OUTPATIENT
Start: 2023-07-05 | End: 2023-07-09 | Stop reason: HOSPADM

## 2023-07-05 RX ORDER — ROCURONIUM BROMIDE 10 MG/ML
INJECTION, SOLUTION INTRAVENOUS
Status: DISCONTINUED | OUTPATIENT
Start: 2023-07-05 | End: 2023-07-05

## 2023-07-05 RX ORDER — TAMSULOSIN HYDROCHLORIDE 0.4 MG/1
0.4 CAPSULE ORAL DAILY
Status: DISCONTINUED | OUTPATIENT
Start: 2023-07-06 | End: 2023-07-09 | Stop reason: HOSPADM

## 2023-07-05 RX ORDER — LOSARTAN POTASSIUM 50 MG/1
50 TABLET ORAL 2 TIMES DAILY
Status: DISCONTINUED | OUTPATIENT
Start: 2023-07-05 | End: 2023-07-09 | Stop reason: HOSPADM

## 2023-07-05 RX ORDER — MELOXICAM 7.5 MG/1
15 TABLET ORAL DAILY
Status: DISCONTINUED | OUTPATIENT
Start: 2023-07-06 | End: 2023-07-08

## 2023-07-05 RX ORDER — MORPHINE SULFATE 4 MG/ML
2 INJECTION, SOLUTION INTRAMUSCULAR; INTRAVENOUS
Status: COMPLETED | OUTPATIENT
Start: 2023-07-05 | End: 2023-07-05

## 2023-07-05 RX ORDER — ONDANSETRON 4 MG/1
4 TABLET, ORALLY DISINTEGRATING ORAL EVERY 6 HOURS PRN
Status: DISCONTINUED | OUTPATIENT
Start: 2023-07-05 | End: 2023-07-09 | Stop reason: HOSPADM

## 2023-07-05 RX ORDER — ONDANSETRON 2 MG/ML
4 INJECTION INTRAMUSCULAR; INTRAVENOUS EVERY 12 HOURS PRN
Status: DISCONTINUED | OUTPATIENT
Start: 2023-07-05 | End: 2023-07-09 | Stop reason: HOSPADM

## 2023-07-05 RX ORDER — GLYCOPYRROLATE 0.2 MG/ML
INJECTION INTRAMUSCULAR; INTRAVENOUS
Status: DISCONTINUED | OUTPATIENT
Start: 2023-07-05 | End: 2023-07-05

## 2023-07-05 RX ORDER — HYDROCODONE BITARTRATE AND ACETAMINOPHEN 5; 325 MG/1; MG/1
1 TABLET ORAL EVERY 4 HOURS PRN
Status: DISCONTINUED | OUTPATIENT
Start: 2023-07-05 | End: 2023-07-09 | Stop reason: HOSPADM

## 2023-07-05 RX ORDER — CIPROFLOXACIN 2 MG/ML
INJECTION, SOLUTION INTRAVENOUS
Status: DISCONTINUED | OUTPATIENT
Start: 2023-07-05 | End: 2023-07-05

## 2023-07-05 RX ORDER — ONDANSETRON 2 MG/ML
4 INJECTION INTRAMUSCULAR; INTRAVENOUS
Status: COMPLETED | OUTPATIENT
Start: 2023-07-05 | End: 2023-07-05

## 2023-07-05 RX ORDER — PROPOFOL 10 MG/ML
VIAL (ML) INTRAVENOUS
Status: DISCONTINUED | OUTPATIENT
Start: 2023-07-05 | End: 2023-07-05

## 2023-07-05 RX ORDER — METOPROLOL SUCCINATE 50 MG/1
100 TABLET, EXTENDED RELEASE ORAL DAILY
Status: DISCONTINUED | OUTPATIENT
Start: 2023-07-06 | End: 2023-07-09 | Stop reason: HOSPADM

## 2023-07-05 RX ORDER — ONDANSETRON 2 MG/ML
INJECTION INTRAMUSCULAR; INTRAVENOUS
Status: DISCONTINUED | OUTPATIENT
Start: 2023-07-05 | End: 2023-07-05

## 2023-07-05 RX ORDER — PHENAZOPYRIDINE HYDROCHLORIDE 100 MG/1
100 TABLET, FILM COATED ORAL 3 TIMES DAILY PRN
Status: DISCONTINUED | OUTPATIENT
Start: 2023-07-05 | End: 2023-07-09 | Stop reason: HOSPADM

## 2023-07-05 RX ORDER — MUPIROCIN 20 MG/G
OINTMENT TOPICAL 2 TIMES DAILY
Status: DISCONTINUED | OUTPATIENT
Start: 2023-07-05 | End: 2023-07-09 | Stop reason: HOSPADM

## 2023-07-05 RX ORDER — FLECAINIDE ACETATE 50 MG/1
50 TABLET ORAL 2 TIMES DAILY
Status: DISCONTINUED | OUTPATIENT
Start: 2023-07-05 | End: 2023-07-09 | Stop reason: HOSPADM

## 2023-07-05 RX ORDER — KETOROLAC TROMETHAMINE 10 MG/1
10 TABLET, FILM COATED ORAL EVERY 8 HOURS PRN
Status: DISCONTINUED | OUTPATIENT
Start: 2023-07-05 | End: 2023-07-09

## 2023-07-05 RX ORDER — ESMOLOL HYDROCHLORIDE 10 MG/ML
INJECTION INTRAVENOUS
Status: DISCONTINUED | OUTPATIENT
Start: 2023-07-05 | End: 2023-07-05

## 2023-07-05 RX ORDER — MORPHINE SULFATE 4 MG/ML
4 INJECTION, SOLUTION INTRAMUSCULAR; INTRAVENOUS
Status: COMPLETED | OUTPATIENT
Start: 2023-07-05 | End: 2023-07-05

## 2023-07-05 RX ORDER — ATORVASTATIN CALCIUM 10 MG/1
10 TABLET, FILM COATED ORAL DAILY
Status: DISCONTINUED | OUTPATIENT
Start: 2023-07-06 | End: 2023-07-09 | Stop reason: HOSPADM

## 2023-07-05 RX ADMIN — LOSARTAN POTASSIUM 50 MG: 50 TABLET, FILM COATED ORAL at 10:07

## 2023-07-05 RX ADMIN — KETOROLAC TROMETHAMINE 15 MG: 30 INJECTION, SOLUTION INTRAMUSCULAR; INTRAVENOUS at 03:07

## 2023-07-05 RX ADMIN — CIPROFLOXACIN 400 MG: 2 INJECTION, SOLUTION INTRAVENOUS at 08:07

## 2023-07-05 RX ADMIN — MUPIROCIN: 20 OINTMENT TOPICAL at 10:07

## 2023-07-05 RX ADMIN — ONDANSETRON 4 MG: 4 TABLET, ORALLY DISINTEGRATING ORAL at 10:07

## 2023-07-05 RX ADMIN — SODIUM CHLORIDE, POTASSIUM CHLORIDE, SODIUM LACTATE AND CALCIUM CHLORIDE 1000 ML: 600; 310; 30; 20 INJECTION, SOLUTION INTRAVENOUS at 03:07

## 2023-07-05 RX ADMIN — ONDANSETRON 4 MG: 2 INJECTION INTRAMUSCULAR; INTRAVENOUS at 03:07

## 2023-07-05 RX ADMIN — SUGAMMADEX 200 MG: 100 INJECTION, SOLUTION INTRAVENOUS at 09:07

## 2023-07-05 RX ADMIN — ESMOLOL HYDROCHLORIDE 50 MG: 100 INJECTION, SOLUTION INTRAVENOUS at 08:07

## 2023-07-05 RX ADMIN — ONDANSETRON 4 MG: 2 INJECTION INTRAMUSCULAR; INTRAVENOUS at 07:07

## 2023-07-05 RX ADMIN — LIDOCAINE HYDROCHLORIDE 80 MG: 20 INJECTION, SOLUTION EPIDURAL; INFILTRATION; INTRACAUDAL; PERINEURAL at 08:07

## 2023-07-05 RX ADMIN — ROCURONIUM BROMIDE 40 MG: 10 SOLUTION INTRAVENOUS at 08:07

## 2023-07-05 RX ADMIN — HYDROCODONE BITARTRATE AND ACETAMINOPHEN 1 TABLET: 5; 325 TABLET ORAL at 10:07

## 2023-07-05 RX ADMIN — SODIUM CHLORIDE, SODIUM GLUCONATE, SODIUM ACETATE, POTASSIUM CHLORIDE AND MAGNESIUM CHLORIDE: 526; 502; 368; 37; 30 INJECTION, SOLUTION INTRAVENOUS at 08:07

## 2023-07-05 RX ADMIN — MORPHINE SULFATE 4 MG: 4 INJECTION INTRAVENOUS at 12:07

## 2023-07-05 RX ADMIN — ESMOLOL HYDROCHLORIDE 30 MG: 100 INJECTION, SOLUTION INTRAVENOUS at 09:07

## 2023-07-05 RX ADMIN — DEXAMETHASONE SODIUM PHOSPHATE 4 MG: 4 INJECTION, SOLUTION INTRA-ARTICULAR; INTRALESIONAL; INTRAMUSCULAR; INTRAVENOUS; SOFT TISSUE at 08:07

## 2023-07-05 RX ADMIN — ONDANSETRON 4 MG: 2 INJECTION INTRAMUSCULAR; INTRAVENOUS at 08:07

## 2023-07-05 RX ADMIN — MORPHINE SULFATE 4 MG: 4 INJECTION, SOLUTION INTRAMUSCULAR; INTRAVENOUS at 03:07

## 2023-07-05 RX ADMIN — MORPHINE SULFATE 4 MG: 4 INJECTION INTRAVENOUS at 07:07

## 2023-07-05 RX ADMIN — KETOROLAC TROMETHAMINE 10 MG: 10 TABLET, FILM COATED ORAL at 10:07

## 2023-07-05 RX ADMIN — PROPOFOL 90 MG: 10 INJECTION, EMULSION INTRAVENOUS at 08:07

## 2023-07-05 RX ADMIN — MORPHINE SULFATE 2 MG: 4 INJECTION, SOLUTION INTRAMUSCULAR; INTRAVENOUS at 07:07

## 2023-07-05 RX ADMIN — GLYCOPYRROLATE 0.2 MG: 0.2 INJECTION INTRAMUSCULAR; INTRAVENOUS at 08:07

## 2023-07-05 NOTE — PLAN OF CARE
Pt lives at [address] [place] [address] Dr Ryan CESPEDES with her  Hari [phone]. There is 1 step to enter and no stairs within. Pt is , no children, no living will or POA. PCP Dr. Manuel TORRES. No agency. No DME. Fills meds with [facility].  will transport home at [place].    07/05/23 1146   Discharge Assessment   Assessment Type Discharge Planning Assessment   Confirmed/corrected address, phone number and insurance Yes   Confirmed Demographics Correct on Facesheet   Source of Information patient   When was your last doctors appointment?   (Benedict Jackson II PCP)   Communicated TIKI with patient/caregiver Date not available/Unable to determine   People in Home spouse   Do you expect to return to your current living situation? Yes   Do you have help at home or someone to help you manage your care at home? Yes   Who are your caregiver(s) and their phone number(s)? lives with  Hari [phone]   Prior to hospitilization cognitive status: Unable to Assess   Current cognitive status: Alert/Oriented   Walking or Climbing Stairs   (none prior to admit)   Dressing/Bathing   (none prior to admit)   Home Accessibility stairs to enter home   Number of Stairs, Main Entrance one   Home Layout Able to live on 1st floor   Equipment Currently Used at Home none   Readmission within 30 days? No   Patient currently being followed by outpatient case management? No   Do you currently have service(s) that help you manage your care at home? No   Do you take prescription medications? Yes  (fills meds with [facility])   Do you have prescription coverage? Yes   Coverage Medicare   Do you have any problems affording any of your prescribed medications? No   Is the patient taking medications as prescribed? yes   Who is going to help you get home at discharge?    How do you get to doctors appointments? car, drives self;family or friend will provide   Are you on dialysis? No   Do you take coumadin? No    Discharge Plan A Home with family   DME Needed Upon Discharge  none   Discharge Plan discussed with: Patient   Transition of Care Barriers None   OTHER   Name(s) of People in Home  Hari

## 2023-07-05 NOTE — ANESTHESIA PREPROCEDURE EVALUATION
07/05/2023  Sumaya Moss is a 82 y.o., female with a past medical history of CAD, hypertension and recent visit to the ER with diagnosis of kidney stone on 07/01/2023 with plans to follow up outpatient with Dr. Cory Thomas.  She was prescribed Zofran, Norco, Toradol and Flomax after discharge from the emergency room however medication was not alleviating her pain.  She subsequently presented to the emergency room last night due to her intractable pain.  She also reports nausea, fatigue and decreased appetite.  She has been hemodynamically stable and afebrile since arrival.  Lab work reveals WBC 12.9, H&H 11.9/36.2, BUN and creatinine 26.1/1.27 (creatinine 0.9 on 07/01/2023); UA with clear yellow urine, 19 RBC, less than 5 WBC, negative nitrites, trace leukocytes, no bacteria.  CT abdomen and pelvis reveals 6 mm proximal left ureteral stone with some inferior migration since 07/01/2023 with persistent left hydronephrosis and mild increase in perinephric inflammation compared to previous CT.  Here for cysto stent      EKG _Inc RBBB  Has had Aflutter in past last echo shoes normal EF, mild MR  Pre-op Assessment    I have reviewed the Patient Summary Reports.     I have reviewed the Nursing Notes. I have reviewed the NPO Status.   I have reviewed the Medications.     Review of Systems  Anesthesia Hx:  No problems with previous Anesthesia    Hematology/Oncology:  Hematology Normal   Oncology Normal     EENT/Dental:EENT/Dental Normal   Cardiovascular:   Hypertension CAD  Dysrhythmias atrial fibrillation    Pulmonary:  Pulmonary Normal    Renal/:   Chronic Renal Disease, CKD renal calculi    Hepatic/GI:  Hepatic/GI Normal    Musculoskeletal:  Musculoskeletal Normal    Neurological:  Neurology Normal    Endocrine:  Obesity / BMI > 30  Dermatological:  Skin Normal    Psych:  Psychiatric Normal            Physical Exam  General: Well nourished, Cooperative, Alert and Oriented    Airway:  Mallampati: II   Mouth Opening: Normal  TM Distance: Normal  Tongue: Normal  Neck ROM: Normal ROM    Chest/Lungs:  Clear to auscultation    Heart:  Rate: Normal        Anesthesia Plan  Type of Anesthesia, risks & benefits discussed:    Anesthesia Type: Gen ETT  Intra-op Monitoring Plan: Standard ASA Monitors  Post Op Pain Control Plan: multimodal analgesia  Induction:  IV and rapid sequence  Airway Plan: Direct  Informed Consent: Informed consent signed with the Patient and all parties understand the risks and agree with anesthesia plan.  All questions answered.   ASA Score: 3  Day of Surgery Review of History & Physical: H&P Update referred to the surgeon/provider.I have interviewed and examined the patient. I have reviewed the patient's H&P dated:     Ready For Surgery From Anesthesia Perspective.     .

## 2023-07-05 NOTE — H&P
[facility]  Emergency [facility]  [facility]  History & Physical    Patient Name: Sumaya Moss  MRN: [id number]  Patient Class: IP- Inpatient  Admission Date: 7/5/2023  Attending Physician: Jamison Jackson II, MD   Primary Care Provider: JAMISON JACKSON MD         Patient information was obtained from patient and ER records.     Subjective:     Principal Problem:Kidney stone    Chief Complaint:   Chief Complaint   Patient presents with    Flank Pain     Pt c/o L sided flank pain and ABD pain, nausea, fatigue, decreased appetite. Pt was diagnosed with kidney stones on 07/01  and discharged with meds and to follow-up with urology. Has not been able see urology yet. Pain not controlled with Rx. Denies fever, vomiting.        HPI: Ms. Darnell is 82-year-old female presents to the emergency room this weekend with complaints of left sided flank pain was diagnosed with a kidney stone placed on Flomax and pain medications symptoms improved after treatment however symptoms did return last night where she received presented to the emergency room again with a 0.5 cm left kidney stone that had migrated slightly.  She is being admitted for evaluation by Urology pain was not controlled with oral medication at home but better with IV morphine.  Being admitted for kidney stone in going for and cysto.  ureteroscopy      Past Medical History:   Diagnosis Date    Coronary artery disease     Fractures     Hypercholesterolemia     Hypertension     Respiratory distress        Past Surgical History:   Procedure Laterality Date    ABLATION      BRAIN SURGERY      aneurysm    COLONOSCOPY      HYSTERECTOMY      INTRAMEDULLARY RODDING OF HUMERUS Left 10/26/2022    Procedure: INSERTION, INTRAMEDULLARY LEATHA, HUMERUS;  Surgeon: Doc Scherer DO;  Location: [facility];  Service: Orthopedics;  Laterality: Left;       Review of patient's allergies indicates:  No Known Allergies    No current facility-administered  medications on file prior to encounter.     Current Outpatient Medications on File Prior to Encounter   Medication Sig    atorvastatin (LIPITOR) 10 MG tablet Take 10 mg by mouth once daily.    doxazosin (CARDURA) 4 MG tablet Take 4 mg by mouth once daily.    flecainide (TAMBOCOR) 50 MG Tab Take 50 mg by mouth 2 (two) times a day.    [] HYDROcodone-acetaminophen (NORCO) 5-325 mg per tablet Take 1 tablet by mouth every 4 (four) hours as needed for Pain.    ketorolac (TORADOL) 10 mg tablet Take 1 tablet (10 mg total) by mouth every 8 (eight) hours as needed for Pain. Do not take any other NSAIDs    losartan (COZAAR) 50 MG tablet Take 50 mg by mouth 2 (two) times a day.    meloxicam (MOBIC) 15 MG tablet Take 1 tablet (15 mg total) by mouth once daily.    metoprolol succinate (TOPROL-XL) 100 MG 24 hr tablet 100 mg once daily.    ondansetron (ZOFRAN-ODT) 4 MG TbDL Take 1 tablet (4 mg total) by mouth every 6 (six) hours as needed (nausea/vomiting).    tamsulosin (FLOMAX) 0.4 mg Cap Take 1 capsule (0.4 mg total) by mouth once daily. for 14 days    vitamin D (VITAMIN D3) 1000 units Tab Take 1,000 Units by mouth once daily.    XARELTO 20 mg Tab Take 20 mg by mouth once daily.     Family History       Problem Relation (Age of Onset)    No Known Problems Mother, Father          Tobacco Use    Smoking status: Never     Passive exposure: Never    Smokeless tobacco: Never   Substance and Sexual Activity    Alcohol use: Never    Drug use: Never    Sexual activity: Yes     Review of Systems   Constitutional: Negative.  Negative for chills, fatigue and fever.   HENT: Negative.     Eyes: Negative.    Respiratory: Negative.  Negative for cough and shortness of breath.    Cardiovascular: Negative.  Negative for chest pain, palpitations and leg swelling.   Gastrointestinal: Negative.  Negative for abdominal distention, abdominal pain, constipation, diarrhea, nausea and vomiting.   Endocrine: Negative.     Genitourinary:  Positive for flank pain. Negative for dysuria and hematuria.   Musculoskeletal:  Negative for arthralgias, back pain and joint swelling.   Skin: Negative.  Negative for rash.   Allergic/Immunologic: Negative.    Neurological: Negative.  Negative for dizziness, seizures and headaches.   Hematological: Negative.    Psychiatric/Behavioral: Negative.  Negative for confusion.    Objective:     Vital Signs (Most Recent):  Temp: 98.5 °F (36.9 °C) (07/05/23 0207)  Pulse: 61 (07/05/23 1400)  Resp: 17 (07/05/23 1400)  BP: (!) 144/56 (07/05/23 1400)  SpO2: 95 % (07/05/23 1400) Vital Signs (24h Range):  Temp:  [98.5 °F (36.9 °C)] 98.5 °F (36.9 °C)  Pulse:  [59-78] 61  Resp:  [17-21] 17  SpO2:  [95 %-98 %] 95 %  BP: (132-168)/(56-79) 144/56     Weight: 79.4 kg (175 lb)  Body mass index is 29.12 kg/m².     Physical Exam  Eyes:      Pupils: Pupils are equal, round, and reactive to light.   Cardiovascular:      Rate and Rhythm: Normal rate.      Pulses: Normal pulses.      Heart sounds: No murmur heard.  Pulmonary:      Effort: Pulmonary effort is normal.      Breath sounds: Normal breath sounds.   Abdominal:      General: Abdomen is flat. Bowel sounds are normal. There is no distension.      Palpations: Abdomen is soft.      Tenderness: There is no guarding.   Musculoskeletal:         General: Normal range of motion.      Cervical back: Normal range of motion and neck supple.   Skin:     General: Skin is warm.   Neurological:      General: No focal deficit present.      Mental Status: She is alert.   Psychiatric:         Mood and Affect: Mood normal.         Behavior: Behavior normal.            CRANIAL NERVES     CN III, IV, VI   Pupils are equal, round, and reactive to light.     Significant Labs: All pertinent labs within the past 24 hours have been reviewed.  Recent Lab Results         07/05/23  0309   07/05/23  0227        Albumin/Globulin Ratio   1.0       Albumin   3.1       Alkaline Phosphatase   111        ALT   9       Appearance, UA Clear         AST   17       Bacteria, UA None Seen         Baso #   0.04       Basophil %   0.3       BILIRUBIN TOTAL   0.6       Bilirubin, UA Negative         BUN   26.1       Calcium   8.9       Chloride   109       CO2   22       Color, UA Yellow         Creatinine   1.27       eGFR   43       Eos #   0.03       Eosinophil %   0.2       Globulin, Total   3.2       Glucose   130       Glucose, UA Negative         Hematocrit   36.2       Hemoglobin   11.9       Immature Grans (Abs)   0.14       Immature Granulocytes   1.1       Ketones, UA Trace         Leukocytes, UA Trace         Lymph #   1.27       LYMPH %   9.8       MCH   31.7       MCHC   32.9       MCV   96.5       Mono #   1.61       Mono %   12.5       MPV   10.5       Neut #   9.81       Neut %   76.1       NITRITE UA Negative         nRBC   0.0       Occult Blood UA 3+         pH, UA 5.0         Platelets   148       Potassium   5.0       PROTEIN TOTAL   6.3       Protein, UA Negative         RBC   3.75       RBC, UA 19         RDW   13.3       Sodium   137       Specific Gravity,UA 1.017         Squam Epithel, UA <5         Urobilinogen, UA 0.2         WBC, UA <5         WBC   12.90               Significant Imaging: I have reviewed all pertinent imaging results/findings within the past 24 hours.    Assessment/Plan:     * Kidney stone  Urology consulted.  Plan procedure this afternoon   Continue Flomax IV fluids pain control      Left flank pain  Kidney stone      Atrial flutter, unspecified type  Resume home meds resume anticoagulation after procedure      VTE Risk Mitigation (From admission, onward)         Ordered     rivaroxaban tablet 20 mg  Daily         07/05/23 5179                           JAMISON MARQUEZ MD  Department of Hospital Medicine  [facility] - Emergency Dept

## 2023-07-05 NOTE — Clinical Note
Diagnosis: Kidney stone [[id number]]   Future Attending Provider: JAMISON MARQUEZ II [[id number]]   Admitting Provider:: JAMISON MARQUEZ II [[id number]]

## 2023-07-05 NOTE — ED PROVIDER NOTES
Encounter Date: 7/5/2023    SCRIBE #1 NOTE: I, Gilda Gan, am scribing for, and in the presence of,  Brian Phillips MD. I have scribed the following portions of the note - Other sections scribed: HPI, ROS, PE.     History     Chief Complaint   Patient presents with    Flank Pain     Pt c/o L sided flank pain and ABD pain, nausea, fatigue, decreased appetite. Pt was diagnosed with kidney stones on 07/01  and discharged with meds and to follow-up with urology. Has not been able see urology yet. Pain not controlled with Rx. Denies fever, vomiting.     Patient is an 82 year old female with a hx of CAD, HTN, and 7 mm left ureteral kidney stone diagnosed on 7/1 in the ED presents to the ED for continued left sided flank and abdominal pain with associated nausea, vomiting, and fatigue. Per medical records, the patient was discharged with zofran, norco, Ketorolac, and flomax, and told to follow up with Cory Thomas. The patient reports that the pain medication is not alleviating her pain. She denies fever.     The history is provided by the patient and medical records. No  was used.   Flank Pain  This is a new problem. The current episode started more than 2 days ago. The problem occurs constantly. The problem has not changed since onset.Associated symptoms include abdominal pain. Pertinent negatives include no chest pain, no headaches and no shortness of breath. Nothing aggravates the symptoms. Nothing relieves the symptoms. She has tried nothing (zofran, norco, Ketorolac, and flomax) for the symptoms. The treatment provided no relief.   Review of patient's allergies indicates:  No Known Allergies  Past Medical History:   Diagnosis Date    Coronary artery disease     Fractures     Hypercholesterolemia     Hypertension     Respiratory distress      Past Surgical History:   Procedure Laterality Date    ABLATION      BRAIN SURGERY      aneurysm    COLONOSCOPY      HYSTERECTOMY      INTRAMEDULLARY  RODDING OF HUMERUS Left 10/26/2022    Procedure: INSERTION, INTRAMEDULLARY LEATHA, HUMERUS;  Surgeon: Doc Scherer DO;  Location: [facility] OR;  Service: Orthopedics;  Laterality: Left;     Family History   Problem Relation Age of Onset    No Known Problems Mother     No Known Problems Father      Social History     Tobacco Use    Smoking status: Never     Passive exposure: Never    Smokeless tobacco: Never   Substance Use Topics    Alcohol use: Never    Drug use: Never     Review of Systems   Constitutional:  Positive for fatigue. Negative for chills and fever.   HENT:  Negative for congestion and ear pain.    Eyes:  Negative for discharge.   Respiratory:  Negative for cough, shortness of breath and wheezing.    Cardiovascular:  Negative for chest pain and leg swelling.   Gastrointestinal:  Positive for abdominal pain, nausea and vomiting. Negative for constipation and diarrhea.   Genitourinary:  Positive for flank pain. Negative for dysuria and frequency.   Musculoskeletal:  Negative for back pain and joint swelling.   Skin:  Negative for rash.   Neurological:  Negative for dizziness, weakness and headaches.   Psychiatric/Behavioral:  Negative for agitation, confusion and hallucinations.      Physical Exam     Initial Vitals [07/05/23 0207]   BP Pulse Resp Temp SpO2   (!) 161/79 78 20 98.5 °F (36.9 °C) 95 %      MAP       --         Physical Exam    Nursing note and vitals reviewed.  Constitutional: She appears well-developed and well-nourished. She is not diaphoretic. No distress.   HENT:   Head: Normocephalic and atraumatic.   Mouth/Throat: Oropharynx is clear and moist.   Eyes: EOM are normal. Pupils are equal, round, and reactive to light.   Neck: Neck supple.   Normal range of motion.  Cardiovascular:  Normal rate, regular rhythm, normal heart sounds and intact distal pulses.           No murmur heard.  Pulmonary/Chest: Breath sounds normal. No respiratory distress. She has no wheezes. She has no rhonchi. She has no  rales.   Abdominal: Abdomen is soft. Bowel sounds are normal. She exhibits no distension. There is abdominal tenderness in the left upper quadrant.   There is left CVA tenderness.   Musculoskeletal:         General: No tenderness or edema. Normal range of motion.      Cervical back: Normal range of motion and neck supple.     Neurological: She is alert and oriented to person, place, and time. She has normal strength. No cranial nerve deficit or sensory deficit. GCS score is 15. GCS eye subscore is 4. GCS verbal subscore is 5. GCS motor subscore is 6.   Skin: Skin is warm and dry. Capillary refill takes less than 2 seconds. No rash noted.   Psychiatric: She has a normal mood and affect. Her behavior is normal. Judgment and thought content normal.       ED Course   Procedures  Labs Reviewed   COMPREHENSIVE METABOLIC PANEL - Abnormal; Notable for the following components:       Result Value    Chloride 109 (*)     Carbon Dioxide 22 (*)     Glucose Level 130 (*)     Blood Urea Nitrogen 26.1 (*)     Creatinine 1.27 (*)     Albumin Level 3.1 (*)     Albumin/Globulin Ratio 1.0 (*)     All other components within normal limits   URINALYSIS, REFLEX TO URINE CULTURE - Abnormal; Notable for the following components:    Ketones, UA Trace (*)     Blood, UA 3+ (*)     Leukocyte Esterase, UA Trace (*)     All other components within normal limits   CBC WITH DIFFERENTIAL - Abnormal; Notable for the following components:    WBC 12.90 (*)     RBC 3.75 (*)     Hgb 11.9 (*)     Hct 36.2 (*)     MCV 96.5 (*)     MCH 31.7 (*)     MCHC 32.9 (*)     MPV 10.5 (*)     Neut # 9.81 (*)     Mono # 1.61 (*)     IG# 0.14 (*)     All other components within normal limits   URINALYSIS, MICROSCOPIC - Abnormal; Notable for the following components:    RBC, UA 19 (*)     All other components within normal limits   CBC W/ AUTO DIFFERENTIAL    Narrative:     The following orders were created for panel order CBC auto differential.  Procedure                                Abnormality         Status                     ---------                               -----------         ------                     CBC with Differential[[id number]]        Abnormal            Final result                 Please view results for these tests on the individual orders.          Imaging Results              CT Abdomen Pelvis  Without Contrast (Final result)  Result time 07/05/23 06:17:18      Final result by Marc Benedict MD (07/05/23 06:17:18)                   Impression:      6 mm proximal left ureteral stone shows inferior migration of a few cm since 07/01/2023.  Persistent mild left hydronephrosis with mild increase in perinephric inflammation since prior exam.      Electronically signed by: Marc Benedict  Date:    07/05/2023  Time:    06:17               Narrative:    EXAMINATION:  CT ABDOMEN PELVIS WITHOUT CONTRAST    CLINICAL HISTORY:  L flank pain;    TECHNIQUE:  CT imaging of the abdomen and pelvis without intravenous contrast. Axial, coronal and sagittal reformatted images reviewed. Dose length product is 508 mGycm. Automatic exposure control, adjustment of mA/kV or iterative reconstruction technique used to limit radiation dose.    COMPARISON:  CT 07/01/2023    FINDINGS:  Assessment of the visceral organs and vasculature is limited by the lack of IV contrast.    Liver/biliary: No concerning hepatic findings. No radiodense gallstone or biliary dilatation appreciated.    Pancreas: Normal.    Spleen: Normal.    Adrenals: Normal.    Genitourinary: The 6 mm proximal left ureteral stone has moved a few cm inferiorly since prior CT.  Mild left hydronephrosis.  Left perinephric inflammation has mildly increased since prior CT.  No right sided urinary stone identified.  Bladder under distended with no definitive abnormality.    Stomach/bowel: No bowel obstruction. Normal appendix.    Lymph nodes and peritoneum: No pathologically enlarged lymph node identified with noncontrast  technique. No ascites or free air.    Vasculature: Mild aortoiliac calcifications.    Abdominal wall: Normal.    Lung bases: Trace left pleural fluid.    Bones: No acute osseous findings.                                       Medications   morphine injection 4 mg (4 mg Intravenous Given 7/5/23 0329)   ondansetron injection 4 mg (4 mg Intravenous Given 7/5/23 0330)   ketorolac injection 15 mg (15 mg Intravenous Given 7/5/23 0330)   lactated ringers bolus 1,000 mL (0 mLs Intravenous Stopped 7/5/23 0709)   morphine injection 2 mg (2 mg Intravenous Given 7/5/23 0733)   ondansetron injection 4 mg (4 mg Intravenous Given 7/5/23 0733)     Medical Decision Making:   History:   I obtained history from: someone other than patient.       <> Summary of History: Collateral from the patient's  at the bedside.  Old Medical Records: I decided to obtain old medical records.  Old Records Summarized: other records, records from clinic visits, records from previous admission(s) and records from another hospital.       <> Summary of Records: Per medical records, the patient was discharged with zofran, norco, Ketorolac, and flomax, and told to follow up with Cory Thomas.   Initial Assessment:   Flank pain  Differential Diagnosis:   Judging by the patient's chief complaint and pertinent history, the patient has the following possible differential diagnoses, including but not limited to the following.  Some of these are deemed to be lower likelihood and some more likely based on my physical exam and history combined with possible lab work and/or imaging studies.   Please see the pertinent studies, and refer to the HPI.  Some of these diagnoses will take further evaluation to fully rule out, perhaps as an outpatient and the patient was encouraged to follow up when discharged for more comprehensive evaluation.    appendicitis, biliary disease, diverticulitis, intraabdominal abcess, gastritis, gastroenteritis, hepatitis, hernia,  pancreatitis, inflammatory bowel disease, PUD, SBP, nephrolithiasis, renal failure, obstructive nephrolithiasis GERD, IBS    Clinical Tests:   Lab Tests: Ordered and Reviewed  Radiological Study: Reviewed and Ordered  ED Management:      Patient is a 82-year-old female presents to emergency department for continued left flank pain.  See HPI.  See physical exam.  Labs and imaging as noted.  CT scan with stone in the left proximal ureter, with hydronephrosis and some slightly increased perinephric stranding.  Patient reports no relief with home pain medication.  Given morphine x2, antiemetics, IV fluids.  Lab work notable for RISHABH.  Urinalysis with RBCs and blood but no evidence of infection.  Discussed with Urology.  Discussed with medicine.  All results discussed with the patient.  Answered all questions time.  Verbalized understanding agreed to plan.        Scribe Attestation:   Scribe #1: I performed the above scribed service and the documentation accurately describes the services I performed. I attest to the accuracy of the note.    Attending Attestation:           Physician Attestation for Scribe:  Physician Attestation Statement for Scribe #1: I, Brian Phillips MD, reviewed documentation, as scribed by Gilda Gan in my presence, and it is both accurate and complete.           ED Course as of 07/05/23 0734   Wed Jul 05, 2023   0538 Paged urology  [MM]   0676 Discussed with Dr. Barkley.  [RP]   0706 Paged medicine.  [RP]   0706 Paged Benedict Jackson  [MM]      ED Course User Index  [MM] Gilda Gan  [RP] Brian Phillips MD          Medical Decision Making  Problems Addressed:  RISHABH (acute kidney injury): acute illness or injury that poses a threat to life or bodily functions  Left flank pain: acute illness or injury that poses a threat to life or bodily functions  Nausea: acute illness or injury that poses a threat to life or bodily functions  Nephrolithiasis: acute illness or injury that poses a threat to  life or bodily functions    Amount and/or Complexity of Data Reviewed  External Data Reviewed: notes.     Details: Per medical records, the patient was discharged with zofran, norco, Ketorolac, and flomax, and told to follow up with Cory Thomas.  Labs: ordered. Decision-making details documented in ED Course.  Radiology: ordered and independent interpretation performed.    Risk  Prescription drug management.  Parenteral controlled substances.  Decision regarding hospitalization.  Elective major surgery with identified risk factors.            Clinical Impression:   Final diagnoses:  [R10.9] Left flank pain (Primary)  [N20.0] Nephrolithiasis  [R11.0] Nausea  [N17.9] RISHABH (acute kidney injury)  [R31.9] Hematuria, unspecified type  [R11.10] Vomiting, unspecified vomiting type, unspecified whether nausea present  [N20.0] Kidney stone        ED Disposition Condition    Observation Stable                Brian Phillips MD  07/05/23 0787

## 2023-07-05 NOTE — SUBJECTIVE & OBJECTIVE
Past Medical History:   Diagnosis Date    Coronary artery disease     Fractures     Hypercholesterolemia     Hypertension     Respiratory distress        Past Surgical History:   Procedure Laterality Date    ABLATION      BRAIN SURGERY      aneurysm    COLONOSCOPY      HYSTERECTOMY      INTRAMEDULLARY RODDING OF HUMERUS Left 10/26/2022    Procedure: INSERTION, INTRAMEDULLARY LEATHA, HUMERUS;  Surgeon: Doc Scherer DO;  Location: [facility];  Service: Orthopedics;  Laterality: Left;       Review of patient's allergies indicates:  No Known Allergies    No current facility-administered medications on file prior to encounter.     Current Outpatient Medications on File Prior to Encounter   Medication Sig    atorvastatin (LIPITOR) 10 MG tablet Take 10 mg by mouth once daily.    doxazosin (CARDURA) 4 MG tablet Take 4 mg by mouth once daily.    flecainide (TAMBOCOR) 50 MG Tab Take 50 mg by mouth 2 (two) times a day.    [] HYDROcodone-acetaminophen (NORCO) 5-325 mg per tablet Take 1 tablet by mouth every 4 (four) hours as needed for Pain.    ketorolac (TORADOL) 10 mg tablet Take 1 tablet (10 mg total) by mouth every 8 (eight) hours as needed for Pain. Do not take any other NSAIDs    losartan (COZAAR) 50 MG tablet Take 50 mg by mouth 2 (two) times a day.    meloxicam (MOBIC) 15 MG tablet Take 1 tablet (15 mg total) by mouth once daily.    metoprolol succinate (TOPROL-XL) 100 MG 24 hr tablet 100 mg once daily.    ondansetron (ZOFRAN-ODT) 4 MG TbDL Take 1 tablet (4 mg total) by mouth every 6 (six) hours as needed (nausea/vomiting).    tamsulosin (FLOMAX) 0.4 mg Cap Take 1 capsule (0.4 mg total) by mouth once daily. for 14 days    vitamin D (VITAMIN D3) 1000 units Tab Take 1,000 Units by mouth once daily.    XARELTO 20 mg Tab Take 20 mg by mouth once daily.     Family History       Problem Relation (Age of Onset)    No Known Problems Mother, Father          Tobacco Use    Smoking status: Never     Passive exposure: Never     Smokeless tobacco: Never   Substance and Sexual Activity    Alcohol use: Never    Drug use: Never    Sexual activity: Yes     Review of Systems   Constitutional: Negative.  Negative for chills, fatigue and fever.   HENT: Negative.     Eyes: Negative.    Respiratory: Negative.  Negative for cough and shortness of breath.    Cardiovascular: Negative.  Negative for chest pain, palpitations and leg swelling.   Gastrointestinal: Negative.  Negative for abdominal distention, abdominal pain, constipation, diarrhea, nausea and vomiting.   Endocrine: Negative.    Genitourinary:  Positive for flank pain. Negative for dysuria and hematuria.   Musculoskeletal:  Negative for arthralgias, back pain and joint swelling.   Skin: Negative.  Negative for rash.   Allergic/Immunologic: Negative.    Neurological: Negative.  Negative for dizziness, seizures and headaches.   Hematological: Negative.    Psychiatric/Behavioral: Negative.  Negative for confusion.    Objective:     Vital Signs (Most Recent):  Temp: 98.5 °F (36.9 °C) (07/05/23 0207)  Pulse: 61 (07/05/23 1400)  Resp: 17 (07/05/23 1400)  BP: (!) 144/56 (07/05/23 1400)  SpO2: 95 % (07/05/23 1400) Vital Signs (24h Range):  Temp:  [98.5 °F (36.9 °C)] 98.5 °F (36.9 °C)  Pulse:  [59-78] 61  Resp:  [17-21] 17  SpO2:  [95 %-98 %] 95 %  BP: (132-168)/(56-79) 144/56     Weight: 79.4 kg (175 lb)  Body mass index is 29.12 kg/m².     Physical Exam  Eyes:      Pupils: Pupils are equal, round, and reactive to light.   Cardiovascular:      Rate and Rhythm: Normal rate.      Pulses: Normal pulses.      Heart sounds: No murmur heard.  Pulmonary:      Effort: Pulmonary effort is normal.      Breath sounds: Normal breath sounds.   Abdominal:      General: Abdomen is flat. Bowel sounds are normal. There is no distension.      Palpations: Abdomen is soft.      Tenderness: There is no guarding.   Musculoskeletal:         General: Normal range of motion.      Cervical back: Normal range of motion and  neck supple.   Skin:     General: Skin is warm.   Neurological:      General: No focal deficit present.      Mental Status: She is alert.   Psychiatric:         Mood and Affect: Mood normal.         Behavior: Behavior normal.            CRANIAL NERVES     CN III, IV, VI   Pupils are equal, round, and reactive to light.     Significant Labs: All pertinent labs within the past 24 hours have been reviewed.  Recent Lab Results         07/05/23  0309   07/05/23  0227        Albumin/Globulin Ratio   1.0       Albumin   3.1       Alkaline Phosphatase   111       ALT   9       Appearance, UA Clear         AST   17       Bacteria, UA None Seen         Baso #   0.04       Basophil %   0.3       BILIRUBIN TOTAL   0.6       Bilirubin, UA Negative         BUN   26.1       Calcium   8.9       Chloride   109       CO2   22       Color, UA Yellow         Creatinine   1.27       eGFR   43       Eos #   0.03       Eosinophil %   0.2       Globulin, Total   3.2       Glucose   130       Glucose, UA Negative         Hematocrit   36.2       Hemoglobin   11.9       Immature Grans (Abs)   0.14       Immature Granulocytes   1.1       Ketones, UA Trace         Leukocytes, UA Trace         Lymph #   1.27       LYMPH %   9.8       MCH   31.7       MCHC   32.9       MCV   96.5       Mono #   1.61       Mono %   12.5       MPV   10.5       Neut #   9.81       Neut %   76.1       NITRITE UA Negative         nRBC   0.0       Occult Blood UA 3+         pH, UA 5.0         Platelets   148       Potassium   5.0       PROTEIN TOTAL   6.3       Protein, UA Negative         RBC   3.75       RBC, UA 19         RDW   13.3       Sodium   137       Specific Gravity,UA 1.017         Squam Epithel, UA <5         Urobilinogen, UA 0.2         WBC, UA <5         WBC   12.90               Significant Imaging: I have reviewed all pertinent imaging results/findings within the past 24 hours.

## 2023-07-05 NOTE — HPI
Ms. Darnell is 82-year-old female presents to the emergency room this weekend with complaints of left sided flank pain was diagnosed with a kidney stone placed on Flomax and pain medications symptoms improved after treatment however symptoms did return last night where she received presented to the emergency room again with a 0.5 cm left kidney stone that had migrated slightly.  She is being admitted for evaluation by Urology pain was not controlled with oral medication at home but better with IV morphine.  Being admitted for kidney stone in going for and cysto.  ureteroscopy

## 2023-07-05 NOTE — CONSULTS
Sumaya TAMIKO Moss 1940  [id number]  7/5/2023    CONSULTING PHYSICIAN: Brian Phillips MD    REASON FOR CONSULTATION:  Obstructing left ureteral calculus    HPI:  The patient is an 82-year-old female with a past medical history of CAD, hypertension and recent visit to the ER with diagnosis of kidney stone on 07/01/2023 with plans to follow up outpatient with Dr. Cory Thomas.  She was prescribed Zofran, Norco, Toradol and Flomax after discharge from the emergency room however medication was not alleviating her pain.  She subsequently presented to the emergency room last night due to her intractable pain.  She also reports nausea, fatigue and decreased appetite.  She has been hemodynamically stable and afebrile since arrival.  Lab work reveals WBC 12.9, H&H 11.9/36.2, BUN and creatinine 26.1/1.27 (creatinine 0.9 on 07/01/2023); UA with clear yellow urine, 19 RBC, less than 5 WBC, negative nitrites, trace leukocytes, no bacteria.  CT abdomen and pelvis reveals 6 mm proximal left ureteral stone with some inferior migration since 07/01/2023 with persistent left hydronephrosis and mild increase in perinephric inflammation compared to previous CT.    The patient continues with left flank pain and nausea.  She has intermittent chills, denies dysuria, hematuria or difficulties urinating.  She denies any history of kidney stones or prior urologic surgery.  Her  is at the bedside.    Past Medical History:   Diagnosis Date    Coronary artery disease     Fractures     Hypercholesterolemia     Hypertension     Respiratory distress      Past Surgical History:   Procedure Laterality Date    ABLATION      BRAIN SURGERY      aneurysm    COLONOSCOPY      HYSTERECTOMY      INTRAMEDULLARY RODDING OF HUMERUS Left 10/26/2022    Procedure: INSERTION, INTRAMEDULLARY LEATHA, HUMERUS;  Surgeon: Doc Scherer DO;  Location: [facility];  Service: Orthopedics;  Laterality: Left;     Family History   Problem Relation Age of Onset    No  Known Problems Mother     No Known Problems Father      Social History     Tobacco Use    Smoking status: Never     Passive exposure: Never    Smokeless tobacco: Never   Substance Use Topics    Alcohol use: Never    Drug use: Never     No current facility-administered medications for this encounter.     Current Outpatient Medications   Medication Sig Dispense Refill    atorvastatin (LIPITOR) 10 MG tablet Take 10 mg by mouth once daily.      doxazosin (CARDURA) 4 MG tablet Take 4 mg by mouth once daily.      flecainide (TAMBOCOR) 50 MG Tab Take 50 mg by mouth 2 (two) times a day.      ketorolac (TORADOL) 10 mg tablet Take 1 tablet (10 mg total) by mouth every 8 (eight) hours as needed for Pain. Do not take any other NSAIDs 15 tablet 0    losartan (COZAAR) 50 MG tablet Take 50 mg by mouth 2 (two) times a day.      meloxicam (MOBIC) 15 MG tablet Take 1 tablet (15 mg total) by mouth once daily. 30 tablet 0    metoprolol succinate (TOPROL-XL) 100 MG 24 hr tablet 100 mg once daily.      ondansetron (ZOFRAN-ODT) 4 MG TbDL Take 1 tablet (4 mg total) by mouth every 6 (six) hours as needed (nausea/vomiting). 20 tablet 0    tamsulosin (FLOMAX) 0.4 mg Cap Take 1 capsule (0.4 mg total) by mouth once daily. for 14 days 14 capsule 0    vitamin D (VITAMIN D3) 1000 units Tab Take 1,000 Units by mouth once daily.      XARELTO 20 mg Tab Take 20 mg by mouth once daily.       Review of patient's allergies indicates:  No Known Allergies    ROS: 12 point review of systems negative other than the HPI    PHYSICAL EXAM:  Vitals:    07/05/23 0547 07/05/23 0702 07/05/23 0733 07/05/23 0800   BP:  (!) 165/63  (!) 168/66   Pulse:  63  60   Resp:  18 20 20   Temp:       TempSrc:       SpO2: 95% 98%  98%   Weight:       Height:         No intake or output data in the 24 hours ending 07/05/23 0833    GEN: WN/WD NAD  HEENT: normocephalic, atraumatic, PERRLA, EOMI, OP clear, nares patent  CV: RRR  RESP: Even and unlabored  ABD:  Soft, NT ND  :  No  urine available for assessment, no CVA tenderness  EXT: no C/C/E  NEURO: no focal deficits, MAEW, AAOx4    LABS:  Recent Results (from the past 24 hour(s))   Comprehensive metabolic panel    Collection Time: 07/05/23  2:27 AM   Result Value Ref Range    Sodium Level 137 136 - 145 mmol/L    Potassium Level 5.0 3.5 - 5.1 mmol/L    Chloride 109 (H) 98 - 107 mmol/L    Carbon Dioxide 22 (L) 23 - 31 mmol/L    Glucose Level 130 (H) 82 - 115 mg/dL    Blood Urea Nitrogen 26.1 (H) 9.8 - 20.1 mg/dL    Creatinine 1.27 (H) 0.55 - 1.02 mg/dL    Calcium Level Total 8.9 8.4 - 10.2 mg/dL    Protein Total 6.3 5.8 - 7.6 gm/dL    Albumin Level 3.1 (L) 3.4 - 4.8 g/dL    Globulin 3.2 2.4 - 3.5 gm/dL    Albumin/Globulin Ratio 1.0 (L) 1.1 - 2.0 ratio    Bilirubin Total 0.6 <=1.5 mg/dL    Alkaline Phosphatase 111 40 - 150 unit/L    Alanine Aminotransferase 9 0 - 55 unit/L    Aspartate Aminotransferase 17 5 - 34 unit/L    eGFR 43 mls/min/1.73/m2   CBC with Differential    Collection Time: 07/05/23  2:27 AM   Result Value Ref Range    WBC 12.90 (H) 4.50 - 11.50 x10(3)/mcL    RBC 3.75 (L) 4.20 - 5.40 x10(6)/mcL    Hgb 11.9 (L) 12.0 - 16.0 g/dL    Hct 36.2 (L) 37.0 - 47.0 %    MCV 96.5 (H) 80.0 - 94.0 fL    MCH 31.7 (H) 27.0 - 31.0 pg    MCHC 32.9 (L) 33.0 - 36.0 g/dL    RDW 13.3 11.5 - 17.0 %    Platelet 148 130 - 400 x10(3)/mcL    MPV 10.5 (H) 7.4 - 10.4 fL    Neut % 76.1 %    Lymph % 9.8 %    Mono % 12.5 %    Eos % 0.2 %    Basophil % 0.3 %    Lymph # 1.27 0.6 - 4.6 x10(3)/mcL    Neut # 9.81 (H) 2.1 - 9.2 x10(3)/mcL    Mono # 1.61 (H) 0.1 - 1.3 x10(3)/mcL    Eos # 0.03 0 - 0.9 x10(3)/mcL    Baso # 0.04 <=0.2 x10(3)/mcL    IG# 0.14 (H) 0 - 0.04 x10(3)/mcL    IG% 1.1 %    NRBC% 0.0 %   Urinalysis, Reflex to Urine Culture    Collection Time: 07/05/23  3:09 AM    Specimen: Urine   Result Value Ref Range    Color, UA Yellow Yellow, Light-Yellow, Dark Yellow, Екатерина, Straw    Appearance, UA Clear Clear    Specific Gravity, UA 1.017 1.005 - 1.030     pH, UA 5.0 5.0 - 8.5    Protein, UA Negative Negative mg/dL    Glucose, UA Negative Negative, Normal mg/dL    Ketones, UA Trace (A) Negative mg/dL    Blood, UA 3+ (A) Negative unit/L    Bilirubin, UA Negative Negative mg/dL    Urobilinogen, UA 0.2 0.2, 1.0, Normal mg/dL    Nitrites, UA Negative Negative    Leukocyte Esterase, UA Trace (A) Negative unit/L   Urinalysis, Microscopic    Collection Time: 07/05/23  3:09 AM   Result Value Ref Range    RBC, UA 19 (H) <=5 /HPF    WBC, UA <5 <=5 /HPF    Squamous Epithelial Cells, UA <5 <=5 /HPF    Bacteria, UA None Seen None Seen, Rare, Occasional /HPF       IMAGING:  Imaging Results              CT Abdomen Pelvis  Without Contrast (Final result)  Result time 07/05/23 06:17:18      Final result by Marc Benedict MD (07/05/23 06:17:18)                   Impression:      6 mm proximal left ureteral stone shows inferior migration of a few cm since 07/01/2023.  Persistent mild left hydronephrosis with mild increase in perinephric inflammation since prior exam.      Electronically signed by: Marc Benedict  Date:    07/05/2023  Time:    06:17               Narrative:    EXAMINATION:  CT ABDOMEN PELVIS WITHOUT CONTRAST    CLINICAL HISTORY:  L flank pain;    TECHNIQUE:  CT imaging of the abdomen and pelvis without intravenous contrast. Axial, coronal and sagittal reformatted images reviewed. Dose length product is 508 mGycm. Automatic exposure control, adjustment of mA/kV or iterative reconstruction technique used to limit radiation dose.    COMPARISON:  CT 07/01/2023    FINDINGS:  Assessment of the visceral organs and vasculature is limited by the lack of IV contrast.    Liver/biliary: No concerning hepatic findings. No radiodense gallstone or biliary dilatation appreciated.    Pancreas: Normal.    Spleen: Normal.    Adrenals: Normal.    Genitourinary: The 6 mm proximal left ureteral stone has moved a few cm inferiorly since prior CT.  Mild left hydronephrosis.  Left perinephric  inflammation has mildly increased since prior CT.  No right sided urinary stone identified.  Bladder under distended with no definitive abnormality.    Stomach/bowel: No bowel obstruction. Normal appendix.    Lymph nodes and peritoneum: No pathologically enlarged lymph node identified with noncontrast technique. No ascites or free air.    Vasculature: Mild aortoiliac calcifications.    Abdominal wall: Normal.    Lung bases: Trace left pleural fluid.    Bones: No acute osseous findings.                                      ASSESSMENT:  6 mm left ureteral calculus with mild hydronephrosis and perinephric inflammation    PLAN:  -Plan is to take patient to the operating room today for cystoscopy with left ureteral stent placement.  The nature of the procedure as well as its attendant risks were discussed in detail with the patient.  She is in agreement with proceeding with surgery as planned.  -keep NPO      Екатерина Hogan NP

## 2023-07-06 LAB
ANION GAP SERPL CALC-SCNC: 8 MEQ/L
BASOPHILS # BLD AUTO: 0.02 X10(3)/MCL
BASOPHILS NFR BLD AUTO: 0.2 %
BUN SERPL-MCNC: 22.1 MG/DL (ref 9.8–20.1)
CALCIUM SERPL-MCNC: 8.7 MG/DL (ref 8.4–10.2)
CHLORIDE SERPL-SCNC: 105 MMOL/L (ref 98–107)
CO2 SERPL-SCNC: 21 MMOL/L (ref 23–31)
CREAT SERPL-MCNC: 1.3 MG/DL (ref 0.55–1.02)
CREAT/UREA NIT SERPL: 17
EOSINOPHIL # BLD AUTO: 0 X10(3)/MCL (ref 0–0.9)
EOSINOPHIL NFR BLD AUTO: 0 %
ERYTHROCYTE [DISTWIDTH] IN BLOOD BY AUTOMATED COUNT: 13 % (ref 11.5–17)
GFR SERPLBLD CREATININE-BSD FMLA CKD-EPI: 41 MLS/MIN/1.73/M2
GLUCOSE SERPL-MCNC: 196 MG/DL (ref 82–115)
HCT VFR BLD AUTO: 36.4 % (ref 37–47)
HGB BLD-MCNC: 11.8 G/DL (ref 12–16)
IMM GRANULOCYTES # BLD AUTO: 0.08 X10(3)/MCL (ref 0–0.04)
IMM GRANULOCYTES NFR BLD AUTO: 0.7 %
LYMPHOCYTES # BLD AUTO: 0.69 X10(3)/MCL (ref 0.6–4.6)
LYMPHOCYTES NFR BLD AUTO: 6 %
MCH RBC QN AUTO: 31.5 PG (ref 27–31)
MCHC RBC AUTO-ENTMCNC: 32.4 G/DL (ref 33–36)
MCV RBC AUTO: 97.1 FL (ref 80–94)
MONOCYTES # BLD AUTO: 0.48 X10(3)/MCL (ref 0.1–1.3)
MONOCYTES NFR BLD AUTO: 4.1 %
NEUTROPHILS # BLD AUTO: 10.31 X10(3)/MCL (ref 2.1–9.2)
NEUTROPHILS NFR BLD AUTO: 89 %
NRBC BLD AUTO-RTO: 0 %
PLATELET # BLD AUTO: 169 X10(3)/MCL (ref 130–400)
PMV BLD AUTO: 10.1 FL (ref 7.4–10.4)
POTASSIUM SERPL-SCNC: 5.2 MMOL/L (ref 3.5–5.1)
RBC # BLD AUTO: 3.75 X10(6)/MCL (ref 4.2–5.4)
SODIUM SERPL-SCNC: 134 MMOL/L (ref 136–145)
WBC # SPEC AUTO: 11.58 X10(3)/MCL (ref 4.5–11.5)

## 2023-07-06 PROCEDURE — 25000003 PHARM REV CODE 250: Performed by: NURSE PRACTITIONER

## 2023-07-06 PROCEDURE — 94799 UNLISTED PULMONARY SVC/PX: CPT

## 2023-07-06 PROCEDURE — 85025 COMPLETE CBC W/AUTO DIFF WBC: CPT | Performed by: UROLOGY

## 2023-07-06 PROCEDURE — 27000221 HC OXYGEN, UP TO 24 HOURS

## 2023-07-06 PROCEDURE — 80048 BASIC METABOLIC PNL TOTAL CA: CPT | Performed by: UROLOGY

## 2023-07-06 PROCEDURE — 25000003 PHARM REV CODE 250: Performed by: INTERNAL MEDICINE

## 2023-07-06 PROCEDURE — 25000003 PHARM REV CODE 250: Performed by: UROLOGY

## 2023-07-06 PROCEDURE — 11000001 HC ACUTE MED/SURG PRIVATE ROOM

## 2023-07-06 PROCEDURE — 99238 HOSP IP/OBS DSCHRG MGMT 30/<: CPT | Mod: ,,, | Performed by: INTERNAL MEDICINE

## 2023-07-06 PROCEDURE — 99238 PR HOSPITAL DISCHARGE DAY,<30 MIN: ICD-10-PCS | Mod: ,,, | Performed by: INTERNAL MEDICINE

## 2023-07-06 RX ORDER — POLYETHYLENE GLYCOL 3350 17 G/17G
17 POWDER, FOR SOLUTION ORAL DAILY
Status: DISCONTINUED | OUTPATIENT
Start: 2023-07-06 | End: 2023-07-09 | Stop reason: HOSPADM

## 2023-07-06 RX ADMIN — HYDROCODONE BITARTRATE AND ACETAMINOPHEN 1 TABLET: 5; 325 TABLET ORAL at 09:07

## 2023-07-06 RX ADMIN — POLYETHYLENE GLYCOL 3350 17 G: 17 POWDER, FOR SOLUTION ORAL at 01:07

## 2023-07-06 RX ADMIN — FLECAINIDE ACETATE 50 MG: 50 TABLET ORAL at 10:07

## 2023-07-06 RX ADMIN — METOPROLOL SUCCINATE 100 MG: 50 TABLET, EXTENDED RELEASE ORAL at 10:07

## 2023-07-06 RX ADMIN — PHENAZOPYRIDINE HYDROCHLORIDE 100 MG: 100 TABLET ORAL at 09:07

## 2023-07-06 RX ADMIN — MELOXICAM 15 MG: 7.5 TABLET ORAL at 10:07

## 2023-07-06 RX ADMIN — FLECAINIDE ACETATE 50 MG: 50 TABLET ORAL at 03:07

## 2023-07-06 RX ADMIN — MUPIROCIN: 20 OINTMENT TOPICAL at 09:07

## 2023-07-06 RX ADMIN — LOSARTAN POTASSIUM 50 MG: 50 TABLET, FILM COATED ORAL at 09:07

## 2023-07-06 RX ADMIN — PHENAZOPYRIDINE HYDROCHLORIDE 100 MG: 100 TABLET ORAL at 03:07

## 2023-07-06 RX ADMIN — TAMSULOSIN HYDROCHLORIDE 0.4 MG: 0.4 CAPSULE ORAL at 10:07

## 2023-07-06 RX ADMIN — ATORVASTATIN CALCIUM 10 MG: 10 TABLET, FILM COATED ORAL at 09:07

## 2023-07-06 RX ADMIN — FLECAINIDE ACETATE 50 MG: 50 TABLET ORAL at 09:07

## 2023-07-06 RX ADMIN — RIVAROXABAN 20 MG: 10 TABLET, FILM COATED ORAL at 10:07

## 2023-07-06 RX ADMIN — LOSARTAN POTASSIUM 50 MG: 50 TABLET, FILM COATED ORAL at 10:07

## 2023-07-06 RX ADMIN — DOXAZOSIN 4 MG: 1 TABLET ORAL at 10:07

## 2023-07-06 NOTE — ANESTHESIA POSTPROCEDURE EVALUATION
Anesthesia Post Evaluation    Patient: Sumaya Moss    Procedure(s) Performed: Procedure(s) (LRB):  CYSTOSCOPY, WITH URETERAL STENT INSERTION (Left)    Final Anesthesia Type: general      Patient location during evaluation: PACU  Patient participation: Yes- Able to Participate  Level of consciousness: awake and alert  Post-procedure vital signs: reviewed and stable  Pain management: adequate  Airway patency: patent  DANIEL mitigation strategies: Multimodal analgesia  PONV status at discharge: No PONV  Anesthetic complications: no      Cardiovascular status: blood pressure returned to baseline and hemodynamically stable  Respiratory status: unassisted  Hydration status: euvolemic  Follow-up not needed.          Vitals Value Taken Time   /78 07/05/23 2202   Temp 36.5 °C (97.7 °F) 07/05/23 2130   Pulse 87 07/05/23 2205   Resp 20 07/05/23 2205   SpO2 94 % 07/05/23 2205   Vitals shown include unvalidated device data.      Event Time   Out of Recovery 22:05:07         Pain/Seth Score: Pain Rating Prior to Med Admin: 10 (7/5/2023  7:04 PM)  Seth Score: 10 (7/5/2023  9:53 PM)

## 2023-07-06 NOTE — NURSING
Nurses Note -- 4 Eyes      7/6/2023   7:55 AM      Skin assessed during: Admit      [x] No Altered Skin Integrity Present    []Prevention Measures Documented      [] Yes- Altered Skin Integrity Present or Discovered   [] LDA Added if Not in Epic (Describe Wound)   [] New Altered Skin Integrity was Present on Admit and Documented in LDA   [] Wound Image Taken    Wound Care Consulted? No    Attending Nurse:  Camille Stroud RN     Second RN/Staff Member:  Nadiya

## 2023-07-06 NOTE — TRANSFER OF CARE
"Anesthesia Transfer of Care Note    Patient: Sumaya Moss    Procedure(s) Performed: Procedure(s) (LRB):  CYSTOSCOPY, WITH URETERAL STENT INSERTION (Left)    Patient location: PACU    Anesthesia Type: general    Transport from OR: Transported from OR on room air with adequate spontaneous ventilation    Post pain: adequate analgesia    Post assessment: no apparent anesthetic complications    Post vital signs: stable    Level of consciousness: awake and alert    Nausea/Vomiting: no nausea/vomiting    Complications: none    Transfer of care protocol was followed      Last vitals:   Visit Vitals  BP (!) 150/97   Pulse 75   Temp 36.9 °C (98.5 °F) (Oral)   Resp 20   Ht 5' 5" (1.651 m)   Wt 79.4 kg (175 lb)   SpO2 99%   BMI 29.12 kg/m²     "

## 2023-07-06 NOTE — PROGRESS NOTES
UROLOGY  PROGRESS  NOTE    Sumaya Moss 1940  [id number]  7/6/2023    POD 1 s/p cystoscopy with left stent    Patient sitting up at bedside  Pain much improved  Pardo removed, reports she voided a small amount, bloody urine.   Feels she is not emptying bladder completely  Also c/o constipation, last BM 4-5 days ago   at bedside    VSS, afebrile  500ml UOP overnight  WBC 11.58  BUN/Cr 22.1/1.30    Intake/Output:  No intake/output data recorded.  I/O last 3 completed shifts:  In: 920 [P.O.:120; IV Piggyback:800]  Out: 500 [Urine:500]     Exam:    NAD  Card: RRR  Resp: unlabored  Abd: soft, NTND  : No urine available for assessment  Extremity: no C/C/E    Recent Results (from the past 24 hour(s))   Basic metabolic panel    Collection Time: 07/06/23  4:25 AM   Result Value Ref Range    Sodium Level 134 (L) 136 - 145 mmol/L    Potassium Level 5.2 (H) 3.5 - 5.1 mmol/L    Chloride 105 98 - 107 mmol/L    Carbon Dioxide 21 (L) 23 - 31 mmol/L    Glucose Level 196 (H) 82 - 115 mg/dL    Blood Urea Nitrogen 22.1 (H) 9.8 - 20.1 mg/dL    Creatinine 1.30 (H) 0.55 - 1.02 mg/dL    BUN/Creatinine Ratio 17     Calcium Level Total 8.7 8.4 - 10.2 mg/dL    Anion Gap 8.0 mEq/L    eGFR 41 mls/min/1.73/m2   CBC with Differential    Collection Time: 07/06/23  4:25 AM   Result Value Ref Range    WBC 11.58 (H) 4.50 - 11.50 x10(3)/mcL    RBC 3.75 (L) 4.20 - 5.40 x10(6)/mcL    Hgb 11.8 (L) 12.0 - 16.0 g/dL    Hct 36.4 (L) 37.0 - 47.0 %    MCV 97.1 (H) 80.0 - 94.0 fL    MCH 31.5 (H) 27.0 - 31.0 pg    MCHC 32.4 (L) 33.0 - 36.0 g/dL    RDW 13.0 11.5 - 17.0 %    Platelet 169 130 - 400 x10(3)/mcL    MPV 10.1 7.4 - 10.4 fL    Neut % 89.0 %    Lymph % 6.0 %    Mono % 4.1 %    Eos % 0.0 %    Basophil % 0.2 %    Lymph # 0.69 0.6 - 4.6 x10(3)/mcL    Neut # 10.31 (H) 2.1 - 9.2 x10(3)/mcL    Mono # 0.48 0.1 - 1.3 x10(3)/mcL    Eos # 0.00 0 - 0.9 x10(3)/mcL    Baso # 0.02 <=0.2 x10(3)/mcL    IG# 0.08 (H) 0 - 0.04 x10(3)/mcL    IG% 0.7 %     NRBC% 0.0 %       Assessment:  6 mm left ureteral calculus with mild hydronephrosis s/p stent    Plan:  Will check PVR after next void. If >350ml will reinsert rodriguez  Miralax ordered  She will need to f/u in 1-2 weeks with Dr. Barkley for definitive stone treatment      Екатерина Hogan NP

## 2023-07-06 NOTE — ANESTHESIA PROCEDURE NOTES
Intubation    Date/Time: 7/5/2023 8:31 PM  Performed by: Tee Hyatt CRNA  Authorized by: Victor Hugo Seals MD     Intubation:     Induction:  Intravenous    Intubated:  Postinduction    Mask Ventilation:  Easy mask    Attempts:  1    Attempted By:  CRNA    Method of Intubation:  Direct    Blade:  Frye 2    Laryngeal View Grade: Grade I - full view of cords      Difficult Airway Encountered?: No      Complications:  None    Airway Device:  Oral endotracheal tube    Airway Device Size:  7.5    Style/Cuff Inflation:  Cuffed (inflated to minimal occlusive pressure)    Tube secured:  21    Secured at:  The lips    Placement Verified By:  Capnometry    Complicating Factors:  None    Findings Post-Intubation:  BS equal bilateral and atraumatic/condition of teeth unchanged

## 2023-07-06 NOTE — OP NOTE
operative note     Surgeon:  Viet Barkley MD     Date of procedure:  07/05/2023     Preop diagnosis:  Left renal calculus    Postop diagnosis:  Same     Procedure:  Cystoscopy with left-sided double-J stent placement including retrograde pyelogram     Preoperative history and indication for procedure:  This patient is a 82-year-old white female who is admitted to the hospital with left-sided colic symptoms related to a large stone in her left renal pelvis.  She normally sees Dr. Cory Thomas I believe.  She did not appear to have urinary infection.  To alleviate her discomfort I recommended we take her for stent placement with definitive management of her stone later.      Procedure in detail:  After obtaining proper consent the patient was taken to the procedure room where they cystoscopy equipment was available.  She was positioned in lithotomy under general anesthesia and prepped and draped in a sterile fashion.  She received ciprofloxacin 400 mg IV on-call and had sequential compression devices in place.  The 22 French cystoscope was introduced per urethra.  She had marked atrophy changes with urethral caruncle.  The bladder appeared healthy without inflammation.  The left ureteral orifice was cannulated with some difficulty.  There seemed to be something in the distal ureter but it was not radiographically apparent and there was no hydroureter above it.  Ultimately the guidewire easily passed up into the ureter and the open-ended catheter was advanced over it.  Retrograde pyelogram was completed and the ureter was measured and a 6 French 24 cm double-J stent was selected and I deployed it but was not satisfied with the upper coil and withdrew the stent and repositioned it.  Finally I was satisfied with its position.  The urine was bloody draining from the kidney and I was not sure if this was related to the obstruction or something traumatic with the stent placement.  It seemed to subside as the case  went on.  I placed a Pardo catheter to monitor her hematuria.  And that completed the procedure.  She was awakened and extubated and transported to recovery room with stable vital signs and in stable condition.  She will be taken to the floor for convalescence and she should be stable for discharge urologically tomorrow.    Viet Barkley MD

## 2023-07-06 NOTE — DISCHARGE SUMMARY
[facility] - 8th Floor Med Surg  [facility] Medicine  Discharge Summary      Patient Name: Sumaya Moss  MRN: [id number]  Admission Date: 7/5/2023  Hospital Length of Stay: 1 days  Discharge Date and Time:  07/06/2023 12:56 PM  Attending Physician: Jamison Jackson II, MD   Discharging Provider: JAMISON JACKSON MD  Discharge Provider Team: Networked reference to record PCT   Primary Care Provider: JAMISON JACKSON MD        HPI: Ms. Darnell is 82-year-old female presents to the emergency room this weekend with complaints of left sided flank pain was diagnosed with a kidney stone placed on Flomax and pain medications symptoms improved after treatment however symptoms did return last night where she received presented to the emergency room again with a 0.5 cm left kidney stone that had migrated slightly.  She is being admitted for evaluation by Urology pain was not controlled with oral medication at home but better with IV morphine.  Being admitted for kidney stone in going for and cysto.     Procedure(s) (LRB):  CYSTOSCOPY, WITH URETERAL STENT INSERTION (Left)      Hospital Course:  Status post cystoscopy yesterday.  Stent placement return to clinic with Urology in 1-2 weeks.  Pain is improved overall stable making urine no complaints vital signs stable afebrile.  Ready to go home.  Please see med rec for any medicine changes.    Consults:     Final Active Diagnoses:    Diagnosis Date Noted POA    PRINCIPAL PROBLEM:  Kidney stone [N20.0] 07/05/2023 Yes    Left flank pain [R10.9] 07/05/2023 Unknown    Atrial flutter, unspecified type [I48.92] 02/07/2023 Yes      Problems Resolved During this Admission:      Discharged Condition: good    Disposition: Home or Self Care    Follow Up:   Follow-up Information       Viet Barkley MD Follow up.    Specialty: Urology  Why: 1-2 weeks for stone treatment  Contact information:  [facility] [address]  [facility] [address]  [phone]                            Patient Instructions:   No discharge procedures on file.  Medications:  Reconciled Home Medications:      Medication List        CONTINUE taking these medications      atorvastatin 10 MG tablet  Commonly known as: LIPITOR  Take 10 mg by mouth once daily.     doxazosin 4 MG tablet  Commonly known as: CARDURA  Take 4 mg by mouth once daily.     flecainide 50 MG Tab  Commonly known as: TAMBOCOR  Take 50 mg by mouth 2 (two) times a day.     ketorolac 10 mg tablet  Commonly known as: TORADOL  Take 1 tablet (10 mg total) by mouth every 8 (eight) hours as needed for Pain. Do not take any other NSAIDs     losartan 50 MG tablet  Commonly known as: COZAAR  Take 50 mg by mouth 2 (two) times a day.     meloxicam 15 MG tablet  Commonly known as: MOBIC  Take 1 tablet (15 mg total) by mouth once daily.     metoprolol succinate 100 MG 24 hr tablet  Commonly known as: TOPROL-XL  100 mg once daily.     ondansetron 4 MG Tbdl  Commonly known as: ZOFRAN-ODT  Take 1 tablet (4 mg total) by mouth every 6 (six) hours as needed (nausea/vomiting).     tamsulosin 0.4 mg Cap  Commonly known as: FLOMAX  Take 1 capsule (0.4 mg total) by mouth once daily. for 14 days     vitamin D 1000 units Tab  Commonly known as: VITAMIN D3  Take 1,000 Units by mouth once daily.     XARELTO 20 mg Tab  Generic drug: rivaroxaban  Take 20 mg by mouth once daily.            ASK your doctor about these medications      HYDROcodone-acetaminophen 5-325 mg per tablet  Commonly known as: NORCO  Take 1 tablet by mouth every 4 (four) hours as needed for Pain.  Ask about: Should I take this medication?              Significant Diagnostic Studies: N/A    Pending Diagnostic Studies:       None          Indwelling Lines/Drains at time of discharge:   Lines/Drains/Airways       None                   Time spent on the discharge of patient: 31 minutes         JAMISON MARQUEZ MD  Department of Hospital Medicine  [facility] - 8th Floor [facility]  Surg

## 2023-07-07 LAB
ANION GAP SERPL CALC-SCNC: 2 MEQ/L
BASOPHILS # BLD AUTO: 0.02 X10(3)/MCL
BASOPHILS NFR BLD AUTO: 0.2 %
BUN SERPL-MCNC: 27.6 MG/DL (ref 9.8–20.1)
CALCIUM SERPL-MCNC: 8.5 MG/DL (ref 8.4–10.2)
CHLORIDE SERPL-SCNC: 108 MMOL/L (ref 98–107)
CO2 SERPL-SCNC: 28 MMOL/L (ref 23–31)
CREAT SERPL-MCNC: 1.33 MG/DL (ref 0.55–1.02)
CREAT/UREA NIT SERPL: 21
EOSINOPHIL # BLD AUTO: 0.05 X10(3)/MCL (ref 0–0.9)
EOSINOPHIL NFR BLD AUTO: 0.4 %
ERYTHROCYTE [DISTWIDTH] IN BLOOD BY AUTOMATED COUNT: 12.9 % (ref 11.5–17)
GFR SERPLBLD CREATININE-BSD FMLA CKD-EPI: 40 MLS/MIN/1.73/M2
GLUCOSE SERPL-MCNC: 115 MG/DL (ref 82–115)
HCT VFR BLD AUTO: 34.7 % (ref 37–47)
HGB BLD-MCNC: 11.4 G/DL (ref 12–16)
IMM GRANULOCYTES # BLD AUTO: 0.11 X10(3)/MCL (ref 0–0.04)
IMM GRANULOCYTES NFR BLD AUTO: 0.9 %
LYMPHOCYTES # BLD AUTO: 1.18 X10(3)/MCL (ref 0.6–4.6)
LYMPHOCYTES NFR BLD AUTO: 9.3 %
MCH RBC QN AUTO: 31.8 PG (ref 27–31)
MCHC RBC AUTO-ENTMCNC: 32.9 G/DL (ref 33–36)
MCV RBC AUTO: 96.7 FL (ref 80–94)
MONOCYTES # BLD AUTO: 1.7 X10(3)/MCL (ref 0.1–1.3)
MONOCYTES NFR BLD AUTO: 13.4 %
NEUTROPHILS # BLD AUTO: 9.62 X10(3)/MCL (ref 2.1–9.2)
NEUTROPHILS NFR BLD AUTO: 75.8 %
NRBC BLD AUTO-RTO: 0 %
PLATELET # BLD AUTO: 182 X10(3)/MCL (ref 130–400)
PMV BLD AUTO: 10 FL (ref 7.4–10.4)
POTASSIUM SERPL-SCNC: 4.8 MMOL/L (ref 3.5–5.1)
RBC # BLD AUTO: 3.59 X10(6)/MCL (ref 4.2–5.4)
SODIUM SERPL-SCNC: 138 MMOL/L (ref 136–145)
WBC # SPEC AUTO: 12.68 X10(3)/MCL (ref 4.5–11.5)

## 2023-07-07 PROCEDURE — 25000003 PHARM REV CODE 250: Performed by: NURSE PRACTITIONER

## 2023-07-07 PROCEDURE — 25000003 PHARM REV CODE 250: Performed by: INTERNAL MEDICINE

## 2023-07-07 PROCEDURE — 99232 PR SUBSEQUENT HOSPITAL CARE,LEVL II: ICD-10-PCS | Mod: ,,, | Performed by: INTERNAL MEDICINE

## 2023-07-07 PROCEDURE — 99232 SBSQ HOSP IP/OBS MODERATE 35: CPT | Mod: ,,, | Performed by: INTERNAL MEDICINE

## 2023-07-07 PROCEDURE — 80048 BASIC METABOLIC PNL TOTAL CA: CPT | Performed by: NURSE PRACTITIONER

## 2023-07-07 PROCEDURE — 85025 COMPLETE CBC W/AUTO DIFF WBC: CPT | Performed by: NURSE PRACTITIONER

## 2023-07-07 PROCEDURE — 25000003 PHARM REV CODE 250: Performed by: UROLOGY

## 2023-07-07 PROCEDURE — 11000001 HC ACUTE MED/SURG PRIVATE ROOM

## 2023-07-07 RX ORDER — HYOSCYAMINE SULFATE 0.12 MG/1
0.12 TABLET SUBLINGUAL EVERY 4 HOURS PRN
Qty: 3 TABLET | Refills: 0 | Status: SHIPPED | OUTPATIENT
Start: 2023-07-07 | End: 2024-02-23

## 2023-07-07 RX ADMIN — LOSARTAN POTASSIUM 50 MG: 50 TABLET, FILM COATED ORAL at 08:07

## 2023-07-07 RX ADMIN — MELOXICAM 15 MG: 7.5 TABLET ORAL at 09:07

## 2023-07-07 RX ADMIN — RIVAROXABAN 20 MG: 10 TABLET, FILM COATED ORAL at 09:07

## 2023-07-07 RX ADMIN — ATORVASTATIN CALCIUM 10 MG: 10 TABLET, FILM COATED ORAL at 09:07

## 2023-07-07 RX ADMIN — TAMSULOSIN HYDROCHLORIDE 0.4 MG: 0.4 CAPSULE ORAL at 09:07

## 2023-07-07 RX ADMIN — POLYETHYLENE GLYCOL 3350 17 G: 17 POWDER, FOR SOLUTION ORAL at 09:07

## 2023-07-07 RX ADMIN — DOXAZOSIN 4 MG: 1 TABLET ORAL at 09:07

## 2023-07-07 RX ADMIN — HYDROCODONE BITARTRATE AND ACETAMINOPHEN 1 TABLET: 5; 325 TABLET ORAL at 06:07

## 2023-07-07 RX ADMIN — LOSARTAN POTASSIUM 50 MG: 50 TABLET, FILM COATED ORAL at 09:07

## 2023-07-07 RX ADMIN — MUPIROCIN: 20 OINTMENT TOPICAL at 08:07

## 2023-07-07 RX ADMIN — METOPROLOL SUCCINATE 100 MG: 50 TABLET, EXTENDED RELEASE ORAL at 09:07

## 2023-07-07 RX ADMIN — FLECAINIDE ACETATE 50 MG: 50 TABLET ORAL at 10:07

## 2023-07-07 NOTE — PROGRESS NOTES
UROLOGY  PROGRESS  NOTE    Sumaya Felixand 1940  [id number]  7/7/2023    POD 2 s/p cystoscopy with left stent    Patient resting in bed   Complaining of some midline back pain which she and her  report is chronic   She is urinating without issues, continues with some hematuria but it is improving    VSS, afebrile  720 mL UOP overnight  WBC 12.68  BUN/Cr 27.6/1.33    Intake/Output:  No intake/output data recorded.  I/O last 3 completed shifts:  In: 2060 [P.O.:1260; IV Piggyback:800]  Out: 1220 [Urine:1220]     Exam:    NAD  Card: RRR  Resp: unlabored  Abd: soft, NTND  : No urine available for assessment  Extremity: no C/C/E    Recent Results (from the past 24 hour(s))   Basic Metabolic Panel    Collection Time: 07/07/23  5:41 AM   Result Value Ref Range    Sodium Level 138 136 - 145 mmol/L    Potassium Level 4.8 3.5 - 5.1 mmol/L    Chloride 108 (H) 98 - 107 mmol/L    Carbon Dioxide 28 23 - 31 mmol/L    Glucose Level 115 82 - 115 mg/dL    Blood Urea Nitrogen 27.6 (H) 9.8 - 20.1 mg/dL    Creatinine 1.33 (H) 0.55 - 1.02 mg/dL    BUN/Creatinine Ratio 21     Calcium Level Total 8.5 8.4 - 10.2 mg/dL    Anion Gap 2.0 mEq/L    eGFR 40 mls/min/1.73/m2   CBC with Differential    Collection Time: 07/07/23  5:41 AM   Result Value Ref Range    WBC 12.68 (H) 4.50 - 11.50 x10(3)/mcL    RBC 3.59 (L) 4.20 - 5.40 x10(6)/mcL    Hgb 11.4 (L) 12.0 - 16.0 g/dL    Hct 34.7 (L) 37.0 - 47.0 %    MCV 96.7 (H) 80.0 - 94.0 fL    MCH 31.8 (H) 27.0 - 31.0 pg    MCHC 32.9 (L) 33.0 - 36.0 g/dL    RDW 12.9 11.5 - 17.0 %    Platelet 182 130 - 400 x10(3)/mcL    MPV 10.0 7.4 - 10.4 fL    Neut % 75.8 %    Lymph % 9.3 %    Mono % 13.4 %    Eos % 0.4 %    Basophil % 0.2 %    Lymph # 1.18 0.6 - 4.6 x10(3)/mcL    Neut # 9.62 (H) 2.1 - 9.2 x10(3)/mcL    Mono # 1.70 (H) 0.1 - 1.3 x10(3)/mcL    Eos # 0.05 0 - 0.9 x10(3)/mcL    Baso # 0.02 <=0.2 x10(3)/mcL    IG# 0.11 (H) 0 - 0.04 x10(3)/mcL    IG% 0.9 %    NRBC% 0.0 %       Assessment:  6 mm  left ureteral calculus with mild hydronephrosis s/p stent    Plan:  Slight increasing creatinine again today.   Nursing reports she was drinking plenty of water  KUB ordered to assess position of stent.       Addendum:  Stent in good position. From Urology standpoint, Ok for discharge home once she is cleared medically. She will need to f/u with Dr. Barkley for definitive stone treatment.       Екатерина Hogan, SANDRA

## 2023-07-07 NOTE — PROGRESS NOTES
[facility] - 8th Floor [facility] Medicine  Progress Note    Patient Name: Sumaya Moss  MRN: [id number]  Patient Class: IP- Inpatient   Admission Date: 7/5/2023  Length of Stay: 2 days  Attending Physician: Jamison Marquez II, MD  Primary Care Provider: JAMISON MARQUEZ MD        Subjective:     Principal Problem:Kidney stone        HPI:  Ms. Darnell is 82-year-old female presents to the emergency room this weekend with complaints of left sided flank pain was diagnosed with a kidney stone placed on Flomax and pain medications symptoms improved after treatment however symptoms did return last night where she received presented to the emergency room again with a 0.5 cm left kidney stone that had migrated slightly.  She is being admitted for evaluation by Urology pain was not controlled with oral medication at home but better with IV morphine.  Being admitted for kidney stone in going for and cysto.  ureteroscopy      Overview/Hospital Course:  No notes on file    Interval History:  Discharge was held yesterday  Complaining of some flank pain, going for KUB for stent assessment.  Vital signs are stable  Main concern of the patient is some constipation.    Review of Systems   Constitutional:  Negative for fatigue and fever.   Respiratory:  Negative for shortness of breath.    Cardiovascular:  Negative for chest pain.   Psychiatric/Behavioral:  Negative for confusion.    Objective:     Vital Signs (Most Recent):  Temp: 97.9 °F (36.6 °C) (07/07/23 1132)  Pulse: 65 (07/07/23 1132)  Resp: 18 (07/07/23 0700)  BP: (!) 165/89 (07/07/23 1132)  SpO2: 95 % (07/07/23 1132) Vital Signs (24h Range):  Temp:  [97.7 °F (36.5 °C)-98.2 °F (36.8 °C)] 97.9 °F (36.6 °C)  Pulse:  [61-69] 65  Resp:  [18-19] 18  SpO2:  [92 %-96 %] 95 %  BP: (142-165)/(68-89) 165/89     Weight: 79.4 kg (175 lb)  Body mass index is 29.12 kg/m².    Intake/Output Summary (Last 24 hours) at 7/7/2023 1326  Last data filed at 7/7/2023  0614  Gross per 24 hour   Intake 1140 ml   Output 720 ml   Net 420 ml         Physical Exam  Eyes:      Pupils: Pupils are equal, round, and reactive to light.   Cardiovascular:      Rate and Rhythm: Normal rate.      Pulses: Normal pulses.      Heart sounds: No murmur heard.  Pulmonary:      Effort: Pulmonary effort is normal.      Breath sounds: Normal breath sounds.   Abdominal:      General: Abdomen is flat. Bowel sounds are normal. There is no distension.      Palpations: Abdomen is soft.      Tenderness: There is no guarding.   Musculoskeletal:         General: Normal range of motion.      Cervical back: Normal range of motion and neck supple.   Skin:     General: Skin is warm.   Neurological:      General: No focal deficit present.      Mental Status: She is alert.   Psychiatric:         Mood and Affect: Mood normal.         Behavior: Behavior normal.           Significant Labs: All pertinent labs within the past 24 hours have been reviewed.  Recent Lab Results         07/07/23  0541        Anion Gap 2.0       Baso # 0.02       Basophil % 0.2       BUN 27.6       BUN/CREAT RATIO 21       Calcium 8.5       Chloride 108       CO2 28       Creatinine 1.33       eGFR 40       Eos # 0.05       Eosinophil % 0.4       Glucose 115       Hematocrit 34.7       Hemoglobin 11.4       Immature Grans (Abs) 0.11       Immature Granulocytes 0.9       Lymph # 1.18       LYMPH % 9.3       MCH 31.8       MCHC 32.9       MCV 96.7       Mono # 1.70       Mono % 13.4       MPV 10.0       Neut # 9.62       Neut % 75.8       nRBC 0.0       Platelets 182       Potassium 4.8       RBC 3.59       RDW 12.9       Sodium 138       WBC 12.68               Significant Imaging: I have reviewed all pertinent imaging results/findings within the past 24 hours.      Assessment/Plan:      * Kidney stone  Initially was discharged yesterday after stent placement  Increase fluids, patient's serum creatinine 1.3 stable compared to yesterday.    KUB  for stent placement  Likely can go home tomorrow.    Left flank pain  Kidney stone      Atrial flutter, unspecified type  Resume home meds resume anticoagulation after procedure      VTE Risk Mitigation (From admission, onward)         Ordered     rivaroxaban tablet 20 mg  Daily         07/05/23 1701                Discharge Planning   TIKI: 7/7/2023     Code Status: Not on file   Is the patient medically ready for discharge?:     Reason for patient still in hospital (select all that apply): Treatment  Discharge Plan A: Home with family                  JAMISON MARQUEZ MD  Department of Hospital Medicine   [facility] - 8th Floor Med Surg

## 2023-07-07 NOTE — ASSESSMENT & PLAN NOTE
Initially was discharged yesterday after stent placement  Increase fluids, patient's serum creatinine 1.3 stable compared to yesterday.    KUB for stent placement  Likely can go home tomorrow.

## 2023-07-07 NOTE — NURSING
Pt noted to urinate and reported no s/s of retention at this time   Jennifer NP instructed to bladder scan   44cc noted with scan   Urine is noted to be red in color   Suspected due to procedure

## 2023-07-07 NOTE — SUBJECTIVE & OBJECTIVE
Interval History:  Discharge was held yesterday  Complaining of some flank pain, going for KUB for stent assessment.  Vital signs are stable  Main concern of the patient is some constipation.    Review of Systems   Constitutional:  Negative for fatigue and fever.   Respiratory:  Negative for shortness of breath.    Cardiovascular:  Negative for chest pain.   Psychiatric/Behavioral:  Negative for confusion.    Objective:     Vital Signs (Most Recent):  Temp: 97.9 °F (36.6 °C) (07/07/23 1132)  Pulse: 65 (07/07/23 1132)  Resp: 18 (07/07/23 0700)  BP: (!) 165/89 (07/07/23 1132)  SpO2: 95 % (07/07/23 1132) Vital Signs (24h Range):  Temp:  [97.7 °F (36.5 °C)-98.2 °F (36.8 °C)] 97.9 °F (36.6 °C)  Pulse:  [61-69] 65  Resp:  [18-19] 18  SpO2:  [92 %-96 %] 95 %  BP: (142-165)/(68-89) 165/89     Weight: 79.4 kg (175 lb)  Body mass index is 29.12 kg/m².    Intake/Output Summary (Last 24 hours) at 7/7/2023 1326  Last data filed at 7/7/2023 0614  Gross per 24 hour   Intake 1140 ml   Output 720 ml   Net 420 ml         Physical Exam  Eyes:      Pupils: Pupils are equal, round, and reactive to light.   Cardiovascular:      Rate and Rhythm: Normal rate.      Pulses: Normal pulses.      Heart sounds: No murmur heard.  Pulmonary:      Effort: Pulmonary effort is normal.      Breath sounds: Normal breath sounds.   Abdominal:      General: Abdomen is flat. Bowel sounds are normal. There is no distension.      Palpations: Abdomen is soft.      Tenderness: There is no guarding.   Musculoskeletal:         General: Normal range of motion.      Cervical back: Normal range of motion and neck supple.   Skin:     General: Skin is warm.   Neurological:      General: No focal deficit present.      Mental Status: She is alert.   Psychiatric:         Mood and Affect: Mood normal.         Behavior: Behavior normal.           Significant Labs: All pertinent labs within the past 24 hours have been reviewed.  Recent Lab Results         07/07/23  0541         Anion Gap 2.0       Baso # 0.02       Basophil % 0.2       BUN 27.6       BUN/CREAT RATIO 21       Calcium 8.5       Chloride 108       CO2 28       Creatinine 1.33       eGFR 40       Eos # 0.05       Eosinophil % 0.4       Glucose 115       Hematocrit 34.7       Hemoglobin 11.4       Immature Grans (Abs) 0.11       Immature Granulocytes 0.9       Lymph # 1.18       LYMPH % 9.3       MCH 31.8       MCHC 32.9       MCV 96.7       Mono # 1.70       Mono % 13.4       MPV 10.0       Neut # 9.62       Neut % 75.8       nRBC 0.0       Platelets 182       Potassium 4.8       RBC 3.59       RDW 12.9       Sodium 138       WBC 12.68               Significant Imaging: I have reviewed all pertinent imaging results/findings within the past 24 hours.

## 2023-07-08 PROCEDURE — 99232 PR SUBSEQUENT HOSPITAL CARE,LEVL II: ICD-10-PCS | Mod: ,,, | Performed by: INTERNAL MEDICINE

## 2023-07-08 PROCEDURE — 99232 SBSQ HOSP IP/OBS MODERATE 35: CPT | Mod: ,,, | Performed by: INTERNAL MEDICINE

## 2023-07-08 PROCEDURE — 63600175 PHARM REV CODE 636 W HCPCS: Performed by: INTERNAL MEDICINE

## 2023-07-08 PROCEDURE — 25000003 PHARM REV CODE 250: Performed by: INTERNAL MEDICINE

## 2023-07-08 PROCEDURE — 11000001 HC ACUTE MED/SURG PRIVATE ROOM

## 2023-07-08 PROCEDURE — 25000003 PHARM REV CODE 250: Performed by: NURSE PRACTITIONER

## 2023-07-08 PROCEDURE — 25000003 PHARM REV CODE 250: Performed by: UROLOGY

## 2023-07-08 RX ORDER — KETOROLAC TROMETHAMINE 30 MG/ML
30 INJECTION, SOLUTION INTRAMUSCULAR; INTRAVENOUS ONCE
Status: COMPLETED | OUTPATIENT
Start: 2023-07-08 | End: 2023-07-08

## 2023-07-08 RX ORDER — SODIUM CHLORIDE 9 MG/ML
INJECTION, SOLUTION INTRAVENOUS CONTINUOUS
Status: DISCONTINUED | OUTPATIENT
Start: 2023-07-08 | End: 2023-07-09 | Stop reason: HOSPADM

## 2023-07-08 RX ORDER — ACETAMINOPHEN 500 MG
1000 TABLET ORAL EVERY 8 HOURS PRN
Status: DISCONTINUED | OUTPATIENT
Start: 2023-07-08 | End: 2023-07-09 | Stop reason: HOSPADM

## 2023-07-08 RX ADMIN — DOXAZOSIN 4 MG: 1 TABLET ORAL at 08:07

## 2023-07-08 RX ADMIN — POLYETHYLENE GLYCOL 3350 17 G: 17 POWDER, FOR SOLUTION ORAL at 08:07

## 2023-07-08 RX ADMIN — FLECAINIDE ACETATE 50 MG: 50 TABLET ORAL at 08:07

## 2023-07-08 RX ADMIN — SODIUM CHLORIDE: 9 INJECTION, SOLUTION INTRAVENOUS at 11:07

## 2023-07-08 RX ADMIN — METOPROLOL SUCCINATE 100 MG: 50 TABLET, EXTENDED RELEASE ORAL at 08:07

## 2023-07-08 RX ADMIN — HYDROCODONE BITARTRATE AND ACETAMINOPHEN 1 TABLET: 5; 325 TABLET ORAL at 02:07

## 2023-07-08 RX ADMIN — MUPIROCIN: 20 OINTMENT TOPICAL at 08:07

## 2023-07-08 RX ADMIN — MUPIROCIN: 20 OINTMENT TOPICAL at 09:07

## 2023-07-08 RX ADMIN — LOSARTAN POTASSIUM 50 MG: 50 TABLET, FILM COATED ORAL at 09:07

## 2023-07-08 RX ADMIN — LOSARTAN POTASSIUM 50 MG: 50 TABLET, FILM COATED ORAL at 08:07

## 2023-07-08 RX ADMIN — MELOXICAM 15 MG: 7.5 TABLET ORAL at 08:07

## 2023-07-08 RX ADMIN — SODIUM CHLORIDE: 9 INJECTION, SOLUTION INTRAVENOUS at 07:07

## 2023-07-08 RX ADMIN — ATORVASTATIN CALCIUM 10 MG: 10 TABLET, FILM COATED ORAL at 08:07

## 2023-07-08 RX ADMIN — TAMSULOSIN HYDROCHLORIDE 0.4 MG: 0.4 CAPSULE ORAL at 08:07

## 2023-07-08 RX ADMIN — FLECAINIDE ACETATE 50 MG: 50 TABLET ORAL at 09:07

## 2023-07-08 RX ADMIN — RIVAROXABAN 20 MG: 10 TABLET, FILM COATED ORAL at 08:07

## 2023-07-08 RX ADMIN — KETOROLAC TROMETHAMINE 30 MG: 30 INJECTION, SOLUTION INTRAMUSCULAR; INTRAVENOUS at 09:07

## 2023-07-08 RX ADMIN — ACETAMINOPHEN 1000 MG: 500 TABLET, FILM COATED ORAL at 09:07

## 2023-07-08 NOTE — ASSESSMENT & PLAN NOTE
- s/p ureteral stent  - Still with severe pain L flank last night  - No BM in 7 days  - Decided to keep another day. Will try Toradol sparingly and normal saline

## 2023-07-08 NOTE — SUBJECTIVE & OBJECTIVE
Interval History: Severe L flank pain last night    Review of Systems   Constitutional:  Negative for chills.   HENT:  Negative for nosebleeds.    Eyes:  Negative for redness.   Respiratory:  Negative for shortness of breath.    Cardiovascular:  Negative for chest pain.   Gastrointestinal:  Negative for abdominal pain.   Genitourinary:  Negative for dysuria.   Musculoskeletal:  Negative for back pain.   Neurological:  Negative for headaches.   Psychiatric/Behavioral:  Negative for behavioral problems.    Objective:     Vital Signs (Most Recent):  Temp: 98.1 °F (36.7 °C) (07/08/23 0753)  Pulse: 68 (07/08/23 0818)  Resp: (!) 22 (07/08/23 0753)  BP: (!) 148/73 (07/08/23 0819)  SpO2: (!) 94 % (07/08/23 0753) Vital Signs (24h Range):  Temp:  [97.9 °F (36.6 °C)-98.2 °F (36.8 °C)] 98.1 °F (36.7 °C)  Pulse:  [62-68] 68  Resp:  [18-22] 22  SpO2:  [93 %-95 %] 94 %  BP: (130-176)/(65-89) 148/73     Weight: 79.4 kg (175 lb)  Body mass index is 29.12 kg/m².    Intake/Output Summary (Last 24 hours) at 7/8/2023 1003  Last data filed at 7/8/2023 0256  Gross per 24 hour   Intake 540 ml   Output 675 ml   Net -135 ml         Physical Exam  Vitals reviewed.   Eyes:      Extraocular Movements: Extraocular movements intact.   Cardiovascular:      Rate and Rhythm: Normal rate and regular rhythm.   Pulmonary:      Effort: Pulmonary effort is normal.      Breath sounds: Normal breath sounds.   Abdominal:      Palpations: Abdomen is soft.      Tenderness: There is no abdominal tenderness.   Neurological:      Mental Status: She is alert.   Psychiatric:         Mood and Affect: Mood normal.           Significant Labs: All pertinent labs within the past 24 hours have been reviewed.    Significant Imaging: I have reviewed all pertinent imaging results/findings within the past 24 hours.

## 2023-07-08 NOTE — PROGRESS NOTES
[facility] 8[facility] Medicine  Progress Note    Patient Name: Sumaya Moss  MRN: [id number]  Patient Class: IP- Inpatient   Admission Date: 7/5/2023  Length of Stay: 3 days  Attending Physician: Jamison Marquez II, MD  Primary Care Provider: JAMISON MARQUEZ MD        Subjective:     Principal Problem:Kidney stone        HPI:  Ms. Darnell is 82-year-old female presents to the emergency room this weekend with complaints of left sided flank pain was diagnosed with a kidney stone placed on Flomax and pain medications symptoms improved after treatment however symptoms did return last night where she received presented to the emergency room again with a 0.5 cm left kidney stone that had migrated slightly.  She is being admitted for evaluation by Urology pain was not controlled with oral medication at home but better with IV morphine.  Being admitted for kidney stone in going for and cysto.  ureteroscopy      Overview/Hospital Course:  No notes on file    Interval History: Severe L flank pain last night    Review of Systems   Constitutional:  Negative for chills.   HENT:  Negative for nosebleeds.    Eyes:  Negative for redness.   Respiratory:  Negative for shortness of breath.    Cardiovascular:  Negative for chest pain.   Gastrointestinal:  Negative for abdominal pain.   Genitourinary:  Negative for dysuria.   Musculoskeletal:  Negative for back pain.   Neurological:  Negative for headaches.   Psychiatric/Behavioral:  Negative for behavioral problems.    Objective:     Vital Signs (Most Recent):  Temp: 98.1 °F (36.7 °C) (07/08/23 0753)  Pulse: 68 (07/08/23 0818)  Resp: (!) 22 (07/08/23 0753)  BP: (!) 148/73 (07/08/23 0819)  SpO2: (!) 94 % (07/08/23 0753) Vital Signs (24h Range):  Temp:  [97.9 °F (36.6 °C)-98.2 °F (36.8 °C)] 98.1 °F (36.7 °C)  Pulse:  [62-68] 68  Resp:  [18-22] 22  SpO2:  [93 %-95 %] 94 %  BP: (130-176)/(65-89) 148/73     Weight: 79.4 kg (175 lb)  Body mass  index is 29.12 kg/m².    Intake/Output Summary (Last 24 hours) at 7/8/2023 1003  Last data filed at 7/8/2023 0256  Gross per 24 hour   Intake 540 ml   Output 675 ml   Net -135 ml         Physical Exam  Vitals reviewed.   Eyes:      Extraocular Movements: Extraocular movements intact.   Cardiovascular:      Rate and Rhythm: Normal rate and regular rhythm.   Pulmonary:      Effort: Pulmonary effort is normal.      Breath sounds: Normal breath sounds.   Abdominal:      Palpations: Abdomen is soft.      Tenderness: There is no abdominal tenderness.   Neurological:      Mental Status: She is alert.   Psychiatric:         Mood and Affect: Mood normal.           Significant Labs: All pertinent labs within the past 24 hours have been reviewed.    Significant Imaging: I have reviewed all pertinent imaging results/findings within the past 24 hours.      Assessment/Plan:      * Kidney stone  - s/p ureteral stent  - Still with severe pain L flank last night  - No BM in 7 days  - Decided to keep another day. Will try Toradol sparingly and normal saline    Left flank pain  Kidney stone      Atrial flutter, unspecified type  - Continue Xarelto      VTE Risk Mitigation (From admission, onward)         Ordered     rivaroxaban tablet 20 mg  Daily         07/05/23 1701                Discharge Planning   TIKI: 7/8/2023     Code Status: Not on file   Is the patient medically ready for discharge?:     Reason for patient still in hospital (select all that apply): Treatment  Discharge Plan A: Home with family                  Eliot Woody II, MD  Department of Hospital Medicine   [facility] - 8th Floor Med Surg

## 2023-07-09 VITALS
RESPIRATION RATE: 18 BRPM | BODY MASS INDEX: 29.16 KG/M2 | WEIGHT: 175 LBS | SYSTOLIC BLOOD PRESSURE: 131 MMHG | TEMPERATURE: 98 F | DIASTOLIC BLOOD PRESSURE: 60 MMHG | OXYGEN SATURATION: 96 % | HEIGHT: 65 IN | HEART RATE: 66 BPM

## 2023-07-09 PROBLEM — R10.9 LEFT FLANK PAIN: Status: RESOLVED | Noted: 2023-07-05 | Resolved: 2023-07-09

## 2023-07-09 PROBLEM — I10 PRIMARY HYPERTENSION: Status: ACTIVE | Noted: 2023-07-09

## 2023-07-09 LAB
ALBUMIN SERPL-MCNC: 2.2 G/DL (ref 3.4–4.8)
ALBUMIN/GLOB SERPL: 0.7 RATIO (ref 1.1–2)
ALP SERPL-CCNC: 88 UNIT/L (ref 40–150)
ALT SERPL-CCNC: 7 UNIT/L (ref 0–55)
AST SERPL-CCNC: 12 UNIT/L (ref 5–34)
BASOPHILS # BLD AUTO: 0.05 X10(3)/MCL
BASOPHILS NFR BLD AUTO: 0.4 %
BILIRUBIN DIRECT+TOT PNL SERPL-MCNC: 0.5 MG/DL
BUN SERPL-MCNC: 24 MG/DL (ref 9.8–20.1)
CALCIUM SERPL-MCNC: 8.3 MG/DL (ref 8.4–10.2)
CHLORIDE SERPL-SCNC: 108 MMOL/L (ref 98–107)
CO2 SERPL-SCNC: 22 MMOL/L (ref 23–31)
CREAT SERPL-MCNC: 0.93 MG/DL (ref 0.55–1.02)
EOSINOPHIL # BLD AUTO: 0.37 X10(3)/MCL (ref 0–0.9)
EOSINOPHIL NFR BLD AUTO: 3.1 %
ERYTHROCYTE [DISTWIDTH] IN BLOOD BY AUTOMATED COUNT: 13.1 % (ref 11.5–17)
GFR SERPLBLD CREATININE-BSD FMLA CKD-EPI: >60 MLS/MIN/1.73/M2
GLOBULIN SER-MCNC: 3.1 GM/DL (ref 2.4–3.5)
GLUCOSE SERPL-MCNC: 97 MG/DL (ref 82–115)
HCT VFR BLD AUTO: 31.8 % (ref 37–47)
HGB BLD-MCNC: 10.5 G/DL (ref 12–16)
IMM GRANULOCYTES # BLD AUTO: 0.1 X10(3)/MCL (ref 0–0.04)
IMM GRANULOCYTES NFR BLD AUTO: 0.8 %
LYMPHOCYTES # BLD AUTO: 1.85 X10(3)/MCL (ref 0.6–4.6)
LYMPHOCYTES NFR BLD AUTO: 15.6 %
MCH RBC QN AUTO: 31 PG (ref 27–31)
MCHC RBC AUTO-ENTMCNC: 33 G/DL (ref 33–36)
MCV RBC AUTO: 93.8 FL (ref 80–94)
MONOCYTES # BLD AUTO: 1.55 X10(3)/MCL (ref 0.1–1.3)
MONOCYTES NFR BLD AUTO: 13.1 %
NEUTROPHILS # BLD AUTO: 7.94 X10(3)/MCL (ref 2.1–9.2)
NEUTROPHILS NFR BLD AUTO: 67 %
NRBC BLD AUTO-RTO: 0 %
PLATELET # BLD AUTO: 190 X10(3)/MCL (ref 130–400)
PMV BLD AUTO: 9.7 FL (ref 7.4–10.4)
POTASSIUM SERPL-SCNC: 4.4 MMOL/L (ref 3.5–5.1)
PROT SERPL-MCNC: 5.3 GM/DL (ref 5.8–7.6)
RBC # BLD AUTO: 3.39 X10(6)/MCL (ref 4.2–5.4)
SODIUM SERPL-SCNC: 137 MMOL/L (ref 136–145)
WBC # SPEC AUTO: 11.86 X10(3)/MCL (ref 4.5–11.5)

## 2023-07-09 PROCEDURE — 63600175 PHARM REV CODE 636 W HCPCS: Performed by: INTERNAL MEDICINE

## 2023-07-09 PROCEDURE — 99239 PR HOSPITAL DISCHARGE DAY,>30 MIN: ICD-10-PCS | Mod: ,,, | Performed by: INTERNAL MEDICINE

## 2023-07-09 PROCEDURE — 25000003 PHARM REV CODE 250: Performed by: UROLOGY

## 2023-07-09 PROCEDURE — 80053 COMPREHEN METABOLIC PANEL: CPT | Performed by: INTERNAL MEDICINE

## 2023-07-09 PROCEDURE — 99239 HOSP IP/OBS DSCHRG MGMT >30: CPT | Mod: ,,, | Performed by: INTERNAL MEDICINE

## 2023-07-09 PROCEDURE — 25000003 PHARM REV CODE 250: Performed by: INTERNAL MEDICINE

## 2023-07-09 PROCEDURE — 85025 COMPLETE CBC W/AUTO DIFF WBC: CPT | Performed by: INTERNAL MEDICINE

## 2023-07-09 RX ORDER — KETOROLAC TROMETHAMINE 30 MG/ML
10 INJECTION, SOLUTION INTRAMUSCULAR; INTRAVENOUS ONCE
Status: COMPLETED | OUTPATIENT
Start: 2023-07-09 | End: 2023-07-09

## 2023-07-09 RX ADMIN — FLECAINIDE ACETATE 50 MG: 50 TABLET ORAL at 09:07

## 2023-07-09 RX ADMIN — RIVAROXABAN 20 MG: 10 TABLET, FILM COATED ORAL at 09:07

## 2023-07-09 RX ADMIN — ATORVASTATIN CALCIUM 10 MG: 10 TABLET, FILM COATED ORAL at 09:07

## 2023-07-09 RX ADMIN — DOXAZOSIN 4 MG: 1 TABLET ORAL at 09:07

## 2023-07-09 RX ADMIN — KETOROLAC TROMETHAMINE 10 MG: 30 INJECTION, SOLUTION INTRAMUSCULAR; INTRAVENOUS at 12:07

## 2023-07-09 RX ADMIN — METOPROLOL SUCCINATE 100 MG: 50 TABLET, EXTENDED RELEASE ORAL at 09:07

## 2023-07-09 RX ADMIN — ACETAMINOPHEN 1000 MG: 500 TABLET, FILM COATED ORAL at 07:07

## 2023-07-09 RX ADMIN — TAMSULOSIN HYDROCHLORIDE 0.4 MG: 0.4 CAPSULE ORAL at 09:07

## 2023-07-09 RX ADMIN — MUPIROCIN: 20 OINTMENT TOPICAL at 09:07

## 2023-07-09 RX ADMIN — LOSARTAN POTASSIUM 50 MG: 50 TABLET, FILM COATED ORAL at 09:07

## 2023-07-09 NOTE — SUBJECTIVE & OBJECTIVE
Interval History: Had large BM today. Still having L flank pain and hematuria    Review of Systems   Constitutional:  Negative for chills.   HENT:  Negative for nosebleeds.    Eyes:  Negative for redness.   Respiratory:  Negative for shortness of breath.    Cardiovascular:  Negative for chest pain.   Gastrointestinal:  Negative for abdominal pain.   Genitourinary:  Positive for flank pain and hematuria. Negative for dysuria.   Musculoskeletal:  Negative for back pain.   Neurological:  Negative for headaches.   Psychiatric/Behavioral:  Negative for behavioral problems.    Objective:     Vital Signs (Most Recent):  Temp: 97.5 °F (36.4 °C) (07/09/23 0738)  Pulse: 70 (07/09/23 0906)  Resp: 18 (07/09/23 0738)  BP: (!) 157/75 (07/09/23 0907)  SpO2: (!) 94 % (07/09/23 0738) Vital Signs (24h Range):  Temp:  [97.5 °F (36.4 °C)-100.2 °F (37.9 °C)] 97.5 °F (36.4 °C)  Pulse:  [67-77] 70  Resp:  [18] 18  SpO2:  [93 %-96 %] 94 %  BP: (130-166)/(71-77) 157/75     Weight: 79.4 kg (175 lb)  Body mass index is 29.12 kg/m².    Intake/Output Summary (Last 24 hours) at 7/9/2023 0945  Last data filed at 7/9/2023 0600  Gross per 24 hour   Intake 1300 ml   Output 1630 ml   Net -330 ml         Physical Exam  Vitals reviewed.   Constitutional:       General: She is not in acute distress.  Eyes:      Extraocular Movements: Extraocular movements intact.   Cardiovascular:      Rate and Rhythm: Normal rate.   Pulmonary:      Effort: Pulmonary effort is normal.      Breath sounds: Normal breath sounds.   Abdominal:      Tenderness: There is left CVA tenderness.   Neurological:      Mental Status: She is alert and oriented to person, place, and time.   Psychiatric:         Mood and Affect: Mood normal.           Significant Labs: All pertinent labs within the past 24 hours have been reviewed.    Significant Imaging: I have reviewed all pertinent imaging results/findings within the past 24 hours.

## 2023-07-09 NOTE — ASSESSMENT & PLAN NOTE
- s/p ureteral stent  - Still with pain L flank and gross hematuria (on Xarelto); re consult urology  - Continue fluids

## 2023-07-09 NOTE — DISCHARGE SUMMARY
[facility] - 8th Floor [facility] Medicine  Discharge Summary      Patient Name: Sumaya Moss  MRN: [id number]  [facility]: [id number]  Patient Class: IP- Inpatient  Admission Date: 7/5/2023  Hospital Length of Stay: 4 days  Discharge Date and Time:  07/09/2023 12:10 PM  Attending Physician: Jamison Jackson II, MD   Discharging Provider: Eliot Woody II, MD  Primary Care Provider: JAMISON JACKSON MD    Primary Care Team: Networked reference to record PCT     HPI:   Ms. Darnell is 82-year-old female presents to the emergency room this weekend with complaints of left sided flank pain was diagnosed with a kidney stone placed on Flomax and pain medications symptoms improved after treatment however symptoms did return last night where she received presented to the emergency room again with a 0.5 cm left kidney stone that had migrated slightly.  She is being admitted for evaluation by Urology pain was not controlled with oral medication at home but better with IV morphine.  Being admitted for kidney stone in going for and cysto.  ureteroscopy      Procedure(s) (LRB):  CYSTOSCOPY, WITH URETERAL STENT INSERTION (Left)      Hospital Course:   Discharge diagnoses:   1. Nephrolithiasis  2. Hypertension   3. 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  She had previously been seen emergency room a few days earlier and was discharged home with Flomax and pain medication.  She came back due to unrelenting L flank pain.  She underwent cystoscopy with left sided double-J stent placement 7/5 without complication.  She suffered with constipation the past 7 days because of narcotic pain medication.  She finally had a large bowel yesterday.  Because of gross hematuria today, without clots, we contacted Urology who cleared her for discharge today.  She is on Xarelto and will have gross hematuria.  L flank pain has  nearly resolved. She is scheduled for follow-up with urology tomorrow clinic.       Goals of Care Treatment Preferences:         Consults:     No new Assessment & Plan notes have been filed under this hospital service since the last note was generated.  Service: Hospital Medicine    Final Active Diagnoses:    Diagnosis Date Noted POA    PRINCIPAL PROBLEM:  Kidney stone [N20.0] 07/05/2023 Yes    Atrial flutter, unspecified type [I48.92] 02/07/2023 Yes    Primary hypertension [I10] 07/09/2023 Yes      Problems Resolved During this Admission:    Diagnosis Date Noted Date Resolved POA    Left flank pain [R10.9] 07/05/2023 07/09/2023 Unknown       Discharged Condition: stable    Disposition: Home or Self Care    Follow Up:   Follow-up Information     Viet Barkley MD Follow up on 7/10/2023.    Specialty: Urology  Why: Appointment is on 7/10 at 1:00pm  Contact information:  [address] [id number] [phone]             JAMISON MARQUEZ MD Follow up in 1 week(s).    Specialty: Internal Medicine  Contact information:  [address] [facility] [address] [phone]                       Patient Instructions:   No discharge procedures on file.    Significant Diagnostic Studies: Labs:   BMP:   Recent Labs   Lab 07/09/23  0438      K 4.4   CO2 22*   BUN 24.0*   CREATININE 0.93   CALCIUM 8.3*       Pending Diagnostic Studies:     None         Medications:  Reconciled Home Medications:      Medication List      START taking these medications    hyoscyamine 0.125 mg Subl  Commonly known as: LEVSIN/SL  Place 1 tablet (0.125 mg total) under the tongue every 4 (four) hours as needed (spasms).        CONTINUE taking these medications    atorvastatin 10 MG tablet  Commonly known as: LIPITOR  Take 10 mg by mouth once daily.     doxazosin 4 MG tablet  Commonly known as: CARDURA  Take 4 mg by mouth once daily.     flecainide 50 MG Tab  Commonly known as: TAMBOCOR  Take 50 mg by mouth 2  (two) times a day.     losartan 50 MG tablet  Commonly known as: COZAAR  Take 50 mg by mouth 2 (two) times a day.     metoprolol succinate 100 MG 24 hr tablet  Commonly known as: TOPROL-XL  100 mg once daily.     ondansetron 4 MG Tbdl  Commonly known as: ZOFRAN-ODT  Take 1 tablet (4 mg total) by mouth every 6 (six) hours as needed (nausea/vomiting).     tamsulosin 0.4 mg Cap  Commonly known as: FLOMAX  Take 1 capsule (0.4 mg total) by mouth once daily. for 14 days     vitamin D 1000 units Tab  Commonly known as: VITAMIN D3  Take 1,000 Units by mouth once daily.     XARELTO 20 mg Tab  Generic drug: rivaroxaban  Take 20 mg by mouth once daily.        STOP taking these medications    HYDROcodone-acetaminophen 5-325 mg per tablet  Commonly known as: NORCO     ketorolac 10 mg tablet  Commonly known as: TORADOL     meloxicam 15 MG tablet  Commonly known as: MOBIC            Indwelling Lines/Drains at time of discharge:   Lines/Drains/Airways     None                 Time spent on the discharge of patient: 45 minutes         Eliot Woody II, MD  Department of Hospital Medicine  [facility] - 8th Floor Med Surg

## 2023-07-09 NOTE — NURSING
"Patient discharged home. All post op instructions, Rx and follow ups given and explained to patient and  @ bedside. I educated patient and  to continue oral hydration and monitor urine output. I also educated the patient and  to notified urologist if the patient begins to experience uncontrollable  bladder spasms, unable to urinate or passing clots when urinating or a change in color of urine. Patient will follow up with urologist tomorrow. Prior to patient discharge I reached out to the urology team to notified them of hematuria the patient was having. Brittaney Hernandez NP stated "Its normal to have some hematuria with stent and xarelto will make it worse. As long as she isnt in clot retention its ok. As long as shes emptying her bladder let it ride. Increase PO water intake." Ok for discharge from urology standpoint per Brittaney Hernandez NP. Patient is voiding appropriately with no clots present.   "

## 2023-07-09 NOTE — HOSPITAL COURSE
Discharge diagnoses:   1. Nephrolithiasis  2. Hypertension   3. 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  She had previously been seen emergency room a few days earlier and was discharged home with Flomax and pain medication.  She came back due to unrelenting L flank pain.  She underwent cystoscopy with left sided double-J stent placement 7/5 without complication.  She suffered with constipation the past 7 days because of narcotic pain medication.  She finally had a large bowel yesterday.  Because of gross hematuria today, without clots, we contacted Urology who cleared her for discharge today.  She is on Xarelto and will have gross hematuria.  L flank pain has nearly resolved. She is scheduled for follow-up with urology tomorrow clinic.

## 2023-07-09 NOTE — PROGRESS NOTES
[facility] - 8th Floor [facility] Medicine  Progress Note    Patient Name: Sumaya Moss  MRN: [id number]  Patient Class: IP- Inpatient   Admission Date: 7/5/2023  Length of Stay: 4 days  Attending Physician: Jamison Marquez II, MD  Primary Care Provider: JAMISON MARQUEZ MD        Subjective:     Principal Problem:Kidney stone        HPI:  Ms. Darnell is 82-year-old female presents to the emergency room this weekend with complaints of left sided flank pain was diagnosed with a kidney stone placed on Flomax and pain medications symptoms improved after treatment however symptoms did return last night where she received presented to the emergency room again with a 0.5 cm left kidney stone that had migrated slightly.  She is being admitted for evaluation by Urology pain was not controlled with oral medication at home but better with IV morphine.  Being admitted for kidney stone in going for and cysto.  ureteroscopy      Overview/Hospital Course:  No notes on file    Interval History: Had large BM today. Still having L flank pain and hematuria    Review of Systems   Constitutional:  Negative for chills.   HENT:  Negative for nosebleeds.    Eyes:  Negative for redness.   Respiratory:  Negative for shortness of breath.    Cardiovascular:  Negative for chest pain.   Gastrointestinal:  Negative for abdominal pain.   Genitourinary:  Positive for flank pain and hematuria. Negative for dysuria.   Musculoskeletal:  Negative for back pain.   Neurological:  Negative for headaches.   Psychiatric/Behavioral:  Negative for behavioral problems.    Objective:     Vital Signs (Most Recent):  Temp: 97.5 °F (36.4 °C) (07/09/23 0738)  Pulse: 70 (07/09/23 0906)  Resp: 18 (07/09/23 0738)  BP: (!) 157/75 (07/09/23 0907)  SpO2: (!) 94 % (07/09/23 0738) Vital Signs (24h Range):  Temp:  [97.5 °F (36.4 °C)-100.2 °F (37.9 °C)] 97.5 °F (36.4 °C)  Pulse:  [67-77] 70  Resp:  [18] 18  SpO2:  [93 %-96 %] 94 %  BP:  (130-166)/(71-77) 157/75     Weight: 79.4 kg (175 lb)  Body mass index is 29.12 kg/m².    Intake/Output Summary (Last 24 hours) at 7/9/2023 0945  Last data filed at 7/9/2023 0600  Gross per 24 hour   Intake 1300 ml   Output 1630 ml   Net -330 ml         Physical Exam  Vitals reviewed.   Constitutional:       General: She is not in acute distress.  Eyes:      Extraocular Movements: Extraocular movements intact.   Cardiovascular:      Rate and Rhythm: Normal rate.   Pulmonary:      Effort: Pulmonary effort is normal.      Breath sounds: Normal breath sounds.   Abdominal:      Tenderness: There is left CVA tenderness.   Neurological:      Mental Status: She is alert and oriented to person, place, and time.   Psychiatric:         Mood and Affect: Mood normal.           Significant Labs: All pertinent labs within the past 24 hours have been reviewed.    Significant Imaging: I have reviewed all pertinent imaging results/findings within the past 24 hours.      Assessment/Plan:      * Kidney stone  - s/p ureteral stent  - Still with pain L flank and gross hematuria (on Xarelto); re consult urology  - Continue fluids    Atrial flutter, unspecified type  - Continue Xarelto    Primary hypertension  - On losartan and metoprolol  - Elevated because of pain; monitor      VTE Risk Mitigation (From admission, onward)         Ordered     rivaroxaban tablet 20 mg  Daily         07/05/23 1701                Discharge Planning   TIKI: 7/9/2023     Code Status: Not on file   Is the patient medically ready for discharge?:     Reason for patient still in hospital (select all that apply): Treatment  Discharge Plan A: Home with family                  Eliot Woody II, MD  Department of Hospital Medicine   [facility] - 8th Floor Med Surg

## 2023-07-09 NOTE — PLAN OF CARE
Problem: Infection  Goal: Absence of Infection Signs and Symptoms  7/9/2023 0809 by Rafael Raoz LPN  Outcome: Ongoing, Progressing  7/9/2023 0809 by Rafael Razo LPN  Outcome: Ongoing, Progressing     Problem: Adult Inpatient Plan of Care  Goal: Plan of Care Review  7/9/2023 0809 by Rafael Razo LPN  Outcome: Ongoing, Progressing  7/9/2023 0809 by Rafael Razo LPN  Outcome: Ongoing, Progressing  Goal: Patient-Specific Goal (Individualized)  7/9/2023 0809 by Rafael Razo LPN  Outcome: Ongoing, Progressing  7/9/2023 0809 by Rafael Razo LPN  Outcome: Ongoing, Progressing  Goal: Absence of Hospital-Acquired Illness or Injury  7/9/2023 0809 by Rafael Razo LPN  Outcome: Ongoing, Progressing  7/9/2023 0809 by Rafael Razo LPN  Outcome: Ongoing, Progressing  Goal: Optimal Comfort and Wellbeing  7/9/2023 0809 by Rafael Razo LPN  Outcome: Ongoing, Progressing  7/9/2023 0809 by Rafael Razo LPN  Outcome: Ongoing, Progressing  Goal: Readiness for Transition of Care  7/9/2023 0809 by Rafael Razo LPN  Outcome: Ongoing, Progressing  7/9/2023 0809 by Rafael Razo LPN  Outcome: Ongoing, Progressing     Problem: Fall Injury Risk  Goal: Absence of Fall and Fall-Related Injury  7/9/2023 0809 by Rafael Razo LPN  Outcome: Ongoing, Progressing  7/9/2023 0809 by Rafael Razo LPN  Outcome: Ongoing, Progressing     Problem: Skin Injury Risk Increased  Goal: Skin Health and Integrity  7/9/2023 0809 by Rafael Razo LPN  Outcome: Ongoing, Progressing  7/9/2023 0809 by Rafael Razo LPN  Outcome: Ongoing, Progressing

## 2023-07-10 ENCOUNTER — PATIENT OUTREACH (OUTPATIENT)
Dept: ADMINISTRATIVE | Facility: CLINIC | Age: 83
End: 2023-07-10
Payer: COMMERCIAL

## 2023-07-10 NOTE — PROGRESS NOTES
C3 nurse attempted to contact Sumaya Moss  for a TCC post hospital discharge follow up call. No answer. Left voicemail with callback information. The patient has a scheduled HOSFU appointment with JAMISON MARQUEZ MD  on 7/13/23 @ 3:20.

## 2023-07-10 NOTE — PROGRESS NOTES
C3 nurse spoke with Sumaya Noriega for a TCC post hospital discharge follow up call. The patient has a scheduled HOSFU appointment with JAMISON MARQUEZ MD  on 7/13/23 @ 3:20pm.

## 2023-07-13 ENCOUNTER — OFFICE VISIT (OUTPATIENT)
Dept: INTERNAL MEDICINE | Facility: CLINIC | Age: 83
End: 2023-07-13
Payer: MEDICARE

## 2023-07-13 VITALS
OXYGEN SATURATION: 97 % | WEIGHT: 183 LBS | SYSTOLIC BLOOD PRESSURE: 122 MMHG | RESPIRATION RATE: 18 BRPM | BODY MASS INDEX: 30.49 KG/M2 | HEART RATE: 64 BPM | DIASTOLIC BLOOD PRESSURE: 76 MMHG | HEIGHT: 65 IN

## 2023-07-13 DIAGNOSIS — N20.0 KIDNEY STONE: Primary | ICD-10-CM

## 2023-07-13 DIAGNOSIS — Z09 HOSPITAL DISCHARGE FOLLOW-UP: ICD-10-CM

## 2023-07-13 PROCEDURE — 99213 OFFICE O/P EST LOW 20 MIN: CPT | Mod: ,,, | Performed by: INTERNAL MEDICINE

## 2023-07-13 PROCEDURE — 99213 PR OFFICE/OUTPT VISIT, EST, LEVL III, 20-29 MIN: ICD-10-PCS | Mod: ,,, | Performed by: INTERNAL MEDICINE

## 2023-07-13 NOTE — ASSESSMENT & PLAN NOTE
Hospital stay did have a low slight bump in his serum creatinine which is back to baseline.  Did have some hematuria that is still ongoing will be seeing Urology for stent removal and possible stone extraction next week.

## 2023-07-13 NOTE — PROGRESS NOTES
Patient ID: Sumaya Moss is a 82 y.o. female.    Chief Complaint: Transitional Care      HPI:   Patient presents here today for above reason.     Ms. Darnell is a 82-year-old female presenting today hospital follow-up for kidney stone.  Did undergo ureteral stent.  Is still complaining of some left flank pain.  Going for cystoscopy next week.  Back back to her baseline no fever.  Still with some hematuria.    The patient's Health Maintenance was reviewed and the following appears to be due at this time:   Health Maintenance Due   Topic Date Due    TETANUS VACCINE  Never done    Shingles Vaccine (1 of 2) Never done    Pneumococcal Vaccines (Age 65+) (2 - PCV) 12/31/2020    COVID-19 Vaccine (4 - Pfizer series) 12/10/2021        Past Medical History:  Past Medical History:   Diagnosis Date    Coronary artery disease     Fractures     Hypercholesterolemia     Hypertension     Respiratory distress      Past Surgical History:   Procedure Laterality Date    ABLATION      BRAIN SURGERY      aneurysm    COLONOSCOPY      CYSTOSCOPY W/ URETERAL STENT PLACEMENT Left 7/5/2023    Procedure: CYSTOSCOPY, WITH URETERAL STENT INSERTION;  Surgeon: Viet Barkley MD;  Location: [facility];  Service: Urology;  Laterality: Left;  LEFT    HYSTERECTOMY      INTRAMEDULLARY RODDING OF HUMERUS Left 10/26/2022    Procedure: INSERTION, INTRAMEDULLARY LEATHA, HUMERUS;  Surgeon: Doc Scherer DO;  Location: [facility] OR;  Service: Orthopedics;  Laterality: Left;     Review of patient's allergies indicates:  No Known Allergies  Current Outpatient Medications on File Prior to Visit   Medication Sig Dispense Refill    atorvastatin (LIPITOR) 10 MG tablet Take 10 mg by mouth once daily.      doxazosin (CARDURA) 4 MG tablet Take 4 mg by mouth once daily.      flecainide (TAMBOCOR) 50 MG Tab Take 50 mg by mouth 2 (two) times a day.      losartan (COZAAR) 50 MG tablet Take 50 mg by mouth 2 (two) times a day.      metoprolol succinate (TOPROL-XL) 100 MG  "24 hr tablet 100 mg once daily.      vitamin D (VITAMIN D3) 1000 units Tab Take 1,000 Units by mouth once daily.      XARELTO 20 mg Tab Take 20 mg by mouth once daily.      hyoscyamine (LEVSIN/SL) 0.125 mg Subl Place 1 tablet (0.125 mg total) under the tongue every 4 (four) hours as needed (spasms). (Patient not taking: Reported on 7/10/2023) 3 tablet 0    ondansetron (ZOFRAN-ODT) 4 MG TbDL Take 1 tablet (4 mg total) by mouth every 6 (six) hours as needed (nausea/vomiting). (Patient not taking: Reported on 7/10/2023) 20 tablet 0    tamsulosin (FLOMAX) 0.4 mg Cap Take 1 capsule (0.4 mg total) by mouth once daily. for 14 days (Patient not taking: Reported on 7/10/2023) 14 capsule 0     No current facility-administered medications on file prior to visit.     Social History     Socioeconomic History    Marital status:    Tobacco Use    Smoking status: Never     Passive exposure: Never    Smokeless tobacco: Never   Substance and Sexual Activity    Alcohol use: Never    Drug use: Never    Sexual activity: Yes   Social History Narrative    ** Merged History Encounter **          Family History   Problem Relation Age of Onset    No Known Problems Mother     No Known Problems Father        ROS:   Comprehensive review of systems was performed and is negative except as noted above    Vitals/PE:   /76 (BP Location: Left arm, Patient Position: Sitting)   Pulse 64   Resp 18   Ht 5' 5" (1.651 m)   Wt 83 kg (183 lb)   SpO2 97%   BMI 30.45 kg/m²   Physical Exam    General: Alert and oriented, No acute distress.   Eye: Normal conjunctiva without exudate.  HENMT: Normocephalic/AT, Normal hearing, Oral mucosa is moist and pink   Neck: No goiter visualized.   Respiratory: Lungs CTAB, Respirations are non-labored, Breath sounds are equal, Symmetrical chest wall expansion.  Cardiovascular: Normal rate, Regular rhythm, No murmur, No edema.   Gastrointestinal: Non-distended.   Genitourinary: Deferred.  Musculoskeletal: " Normal ROM, Normal gait, No deformities or amputations.  Integumentary: Warm, Dry, Intact. No diaphoresis, or flushing.  Neurologic: No focal deficits, Cranial Nerves II-XII are grossly intact.   Psychiatric: Cooperative, Appropriate mood & affect, Normal judgment, Non-suicidal.    Assessment/Plan:       1. Kidney stone  Assessment & Plan:  Going for cystoscopy next week.  Continue current therapy stay well hydrated.  She did have a 6 mm mid ureteral stone on the left.  Did undergo stenting going for stent removal.  Sees urology recommended either stone extraction versus lithotripsy however given her history of being on blood thinners lithotripsy may not be the best option.      2. Hospital discharge follow-up  Assessment & Plan:  Hospital stay did have a low slight bump in his serum creatinine which is back to baseline.  Did have some hematuria that is still ongoing will be seeing Urology for stent removal and possible stone extraction next week.               Education and counseling done face to face regarding medical conditions and plan. Contact office if new symptoms develop. Should any symptoms ever significantly worsen seek emergency medical attention/go to ER. Follow up at least yearly for wellness or sooner PRN. Nurse to call patient with any results. The patient is receptive, expresses understanding and is agreeable to plan. All questions have been answered.    No follow-ups on file.

## 2023-07-13 NOTE — ASSESSMENT & PLAN NOTE
Going for cystoscopy next week.  Continue current therapy stay well hydrated.  She did have a 6 mm mid ureteral stone on the left.  Did undergo stenting going for stent removal.  Sees urology recommended either stone extraction versus lithotripsy however given her history of being on blood thinners lithotripsy may not be the best option.

## 2023-07-14 NOTE — PHYSICIAN QUERY
PT Name: Sumaya Moss  MR #: [id number]     DOCUMENTATION CLARIFICATION     CDS/: Jose Mckeon, RN, BSN   Contact information: [email or web address]       This form is a permanent document in the medical record.    Query Date: July 14, 2023    By submitting this query, we are merely seeking further clarification of documentation.  Please utilize your independent clinical judgment when addressing the question(s) below.    The Medical Record contains the following:   Indicator Supporting Clinical Findings Location in Medical Record    Kidney (Renal) Insufficiency     X Kidney (Renal) Failure/Injury -- Lab work notable for RISHABH.     -- Problems Addressed:  RISHABH (acute kidney injury): acute illness or injury that poses a threat to life or bodily functions    Final diagnoses:  Nephrolithiasis  RISHABH (acute kidney injury)  Kidney stone    Emergency Physician Note 7/5/2023     Nephrotoxic Agents     X BUN/Creatinine           GFR  BUN and creatinine 26.1/1.27    (creatinine 0.9 on 07/01/2023);             Creatinine    Latest Reference Range & Units 07/05/23 02:27 07/06/23 04:25 07/07/23 05:41 07/09/23 04:38   Creatinine 0.55 - 1.02 mg/dL 1.27 (H) 1.30 (H) 1.33 (H) 0.93   (H): Data is abnormally high      BUN    Latest Reference Range & Units 07/05/23 02:27 07/06/23 04:25 07/07/23 05:41 07/09/23 04:38   BUN 9.8 - 20.1 mg/dL 26.1 (H) 22.1 (H) 27.6 (H) 24.0 (H)   (H): Data is abnormally high      eGFR   Latest Reference Range & Units 07/05/23 02:27 07/06/23 04:25 07/07/23 05:41 07/09/23 04:38   eGFR mls/min/1.73/m2 43 41 40 >60      Urology CN 7/5/2023              Lab Results 7/5/2023 - 7/9/2023                       Lab Results 7/5/2023 - 7/9/2023                     Lab Results 7/5/2023 - 7/9/2023     Urine: Casts         Eosinophils     X Urine Output 500ml UOP overnight    Intake/Output:  No intake/output data recorded.  I/O last 3 completed shifts:  In: 920 [P.O.:120; IV Piggyback:800]  Out: 500 [Urine:500]     Urology PN 7/6/2023     Dehydration      Nausea/Vomiting      Dialysis/CRRT     X Treatment Lactated Ringer's Bolus 1,000 ml IV once in ED         0.9% NaCl Infusion 125 ml/hr IV continuous   MAR 7/5/2023        MAR 7/8/2023    Other         [facility] approved diagnostic criteria for acute kidney injury is based on KDIGO criteria:    An increase in serum creatinine > 0.3mg/dl within 48 hours  OR  Increase in serum creatinine to > 1.5x baseline, which is known or presumed to have occurred within the prior 7 days  OR  Urine volume <0.5 ml/kg/hr for 6 hours       The clinical guidelines noted above are only a system guideline. It does not replace the providers clinical judgment.       Due to the conflicting clinical picture, please clinically validate the diagnosis of _AKI (Acute Kidney Injury)____.      If validated, please provide additional clinical support for the diagnosis.       [ X  ] Above stated diagnosis is not confirmed and/or it has been ruled out     [   ] Above stated diagnosis is not confirmed and/or it has been ruled out, other diagnosis ruled in (please          specify):_______________     [   ] Above stated diagnosis is confirmed. Please specify clinical support (signs, symptoms, and treatment) for  the confirmed diagnosis: ____________________     [   ] Other clarification (please specify): ___________________        Please document in your progress notes daily for the duration of treatment until resolved and include in your discharge summary.    References:   KDIGO Clinical Practice Guideline for Acute Kidney Injury. (2012, March). Retrieved October 21, 2020, from https://kdigo.org/wp-content/uploads/2016/10/UNCML-1983-PQM-Guideline-English.pdf    BRITTANI Babb MD, & EBENEZER Grimes MD, MS. (2020, June 18). Definition and staging of chronic kidney disease in adults ([id number] SADIA Starks MD, ScD & [id number] LY Johns MD, MSc, Eds.). Retrieved October 21, 2020, from  https://www.[employer]/contents/definition-and-staging-of-chronic-kidney-disease-in-adults?search=ckd%20staging&source=search_result&selectedTitle=1~150&usage_type=default&display_rank=1     MEG Pereira MD, FACP. (2015, Morena 15). Acute kidney injury revisited. Retrieved October 21, 2020, from https://acphospitalist.org/archives/2015/06/coding-acute-kidney-injury.htm      Form No. 78928

## 2023-09-05 ENCOUNTER — DOCUMENTATION ONLY (OUTPATIENT)
Dept: INTERNAL MEDICINE | Facility: CLINIC | Age: 83
End: 2023-09-05
Payer: COMMERCIAL

## 2023-09-27 ENCOUNTER — TELEPHONE (OUTPATIENT)
Dept: INTERNAL MEDICINE | Facility: CLINIC | Age: 83
End: 2023-09-27
Payer: COMMERCIAL

## 2023-09-27 NOTE — TELEPHONE ENCOUNTER
----- Message from Chinmay Segura LPN sent at 9/27/2023  8:34 AM CDT -----  Regarding: FW: return call / chet    ----- Message -----  From: Betzy Parra  Sent: 9/27/2023   8:19 AM CDT  To: Manuel Mcmullen Staff  Subject: return call / chet                                Type:  Patient Returning Call    Who Called:pt's  sonam  Who Left Message for Patient:chet  Does the patient know what this is regarding?:  Would the patient rather a call back or a response via [facility]? C/b  Best Call Back Number:[phone]  Additional Information:

## 2023-10-06 ENCOUNTER — TELEPHONE (OUTPATIENT)
Dept: INTERNAL MEDICINE | Facility: CLINIC | Age: 83
End: 2023-10-06
Payer: COMMERCIAL

## 2023-10-06 NOTE — TELEPHONE ENCOUNTER
----- Message from Betzy Parra sent at 10/6/2023  8:18 AM CDT -----  Regardinnd opinion  Type:  Needs Medical Advice    Who Called: pt     Would the patient rather a call back or a response via [facility]? C/b  Best Call Back Number: [phone]    Additional Information: pt would like a c/b to discuss.  Pt wants a second opinion on program another doctor wants to put pt on

## 2023-10-16 PROBLEM — Z09 HOSPITAL DISCHARGE FOLLOW-UP: Status: RESOLVED | Noted: 2023-07-13 | Resolved: 2023-10-16

## 2024-02-15 DIAGNOSIS — I70.0 AORTIC ATHEROSCLEROSIS: ICD-10-CM

## 2024-02-15 DIAGNOSIS — I48.91 ATRIAL FIBRILLATION, UNSPECIFIED TYPE: ICD-10-CM

## 2024-02-15 DIAGNOSIS — E55.9 VITAMIN D DEFICIENCY: ICD-10-CM

## 2024-02-15 DIAGNOSIS — I10 HYPERTENSION, UNSPECIFIED TYPE: ICD-10-CM

## 2024-02-15 DIAGNOSIS — M81.0 OSTEOPOROSIS, UNSPECIFIED OSTEOPOROSIS TYPE, UNSPECIFIED PATHOLOGICAL FRACTURE PRESENCE: ICD-10-CM

## 2024-02-15 DIAGNOSIS — Z00.00 WELLNESS EXAMINATION: Primary | ICD-10-CM

## 2024-02-15 DIAGNOSIS — R53.83 FATIGUE, UNSPECIFIED TYPE: ICD-10-CM

## 2024-02-16 ENCOUNTER — TELEPHONE (OUTPATIENT)
Dept: INTERNAL MEDICINE | Facility: CLINIC | Age: 84
End: 2024-02-16
Payer: MEDICARE

## 2024-02-16 ENCOUNTER — LAB VISIT (OUTPATIENT)
Dept: LAB | Facility: HOSPITAL | Age: 84
End: 2024-02-16
Attending: INTERNAL MEDICINE
Payer: MEDICARE

## 2024-02-16 DIAGNOSIS — I48.91 ATRIAL FIBRILLATION, UNSPECIFIED TYPE: ICD-10-CM

## 2024-02-16 DIAGNOSIS — I70.0 AORTIC ATHEROSCLEROSIS: ICD-10-CM

## 2024-02-16 DIAGNOSIS — E55.9 VITAMIN D DEFICIENCY: ICD-10-CM

## 2024-02-16 DIAGNOSIS — I10 HYPERTENSION, UNSPECIFIED TYPE: ICD-10-CM

## 2024-02-16 DIAGNOSIS — Z00.00 WELLNESS EXAMINATION: ICD-10-CM

## 2024-02-16 DIAGNOSIS — R53.83 FATIGUE, UNSPECIFIED TYPE: ICD-10-CM

## 2024-02-16 DIAGNOSIS — M81.0 OSTEOPOROSIS, UNSPECIFIED OSTEOPOROSIS TYPE, UNSPECIFIED PATHOLOGICAL FRACTURE PRESENCE: ICD-10-CM

## 2024-02-16 LAB
ALBUMIN SERPL-MCNC: 3.5 G/DL (ref 3.4–4.8)
ALBUMIN/GLOB SERPL: 1.3 RATIO (ref 1.1–2)
ALP SERPL-CCNC: 130 UNIT/L (ref 40–150)
ALT SERPL-CCNC: 13 UNIT/L (ref 0–55)
AST SERPL-CCNC: 15 UNIT/L (ref 5–34)
BASOPHILS # BLD AUTO: 0.04 X10(3)/MCL
BASOPHILS NFR BLD AUTO: 0.5 %
BILIRUB SERPL-MCNC: 0.5 MG/DL
BUN SERPL-MCNC: 21.1 MG/DL (ref 9.8–20.1)
CALCIUM SERPL-MCNC: 9.4 MG/DL (ref 8.4–10.2)
CHLORIDE SERPL-SCNC: 110 MMOL/L (ref 98–107)
CHOLEST SERPL-MCNC: 131 MG/DL
CHOLEST/HDLC SERPL: 2 {RATIO} (ref 0–5)
CO2 SERPL-SCNC: 27 MMOL/L (ref 23–31)
CREAT SERPL-MCNC: 0.84 MG/DL (ref 0.55–1.02)
DEPRECATED CALCIDIOL+CALCIFEROL SERPL-MC: 26.8 NG/ML (ref 30–80)
EOSINOPHIL # BLD AUTO: 0.15 X10(3)/MCL (ref 0–0.9)
EOSINOPHIL NFR BLD AUTO: 1.9 %
ERYTHROCYTE [DISTWIDTH] IN BLOOD BY AUTOMATED COUNT: 13.7 % (ref 11.5–17)
GFR SERPLBLD CREATININE-BSD FMLA CKD-EPI: >60 MLS/MIN/1.73/M2
GLOBULIN SER-MCNC: 2.8 GM/DL (ref 2.4–3.5)
GLUCOSE SERPL-MCNC: 97 MG/DL (ref 82–115)
HCT VFR BLD AUTO: 42.1 % (ref 37–47)
HDLC SERPL-MCNC: 53 MG/DL (ref 35–60)
HGB BLD-MCNC: 13.5 G/DL (ref 12–16)
IMM GRANULOCYTES # BLD AUTO: 0.03 X10(3)/MCL (ref 0–0.04)
IMM GRANULOCYTES NFR BLD AUTO: 0.4 %
LDLC SERPL CALC-MCNC: 65 MG/DL (ref 50–140)
LYMPHOCYTES # BLD AUTO: 2.22 X10(3)/MCL (ref 0.6–4.6)
LYMPHOCYTES NFR BLD AUTO: 27.5 %
MCH RBC QN AUTO: 31.5 PG (ref 27–31)
MCHC RBC AUTO-ENTMCNC: 32.1 G/DL (ref 33–36)
MCV RBC AUTO: 98.1 FL (ref 80–94)
MONOCYTES # BLD AUTO: 0.9 X10(3)/MCL (ref 0.1–1.3)
MONOCYTES NFR BLD AUTO: 11.2 %
NEUTROPHILS # BLD AUTO: 4.72 X10(3)/MCL (ref 2.1–9.2)
NEUTROPHILS NFR BLD AUTO: 58.5 %
NRBC BLD AUTO-RTO: 0 %
PLATELET # BLD AUTO: 167 X10(3)/MCL (ref 130–400)
PMV BLD AUTO: 9.4 FL (ref 7.4–10.4)
POTASSIUM SERPL-SCNC: 4.5 MMOL/L (ref 3.5–5.1)
PROT SERPL-MCNC: 6.3 GM/DL (ref 5.8–7.6)
RBC # BLD AUTO: 4.29 X10(6)/MCL (ref 4.2–5.4)
SODIUM SERPL-SCNC: 141 MMOL/L (ref 136–145)
TRIGL SERPL-MCNC: 63 MG/DL (ref 37–140)
TSH SERPL-ACNC: 1.48 UIU/ML (ref 0.35–4.94)
VLDLC SERPL CALC-MCNC: 13 MG/DL
WBC # SPEC AUTO: 8.06 X10(3)/MCL (ref 4.5–11.5)

## 2024-02-16 PROCEDURE — 82306 VITAMIN D 25 HYDROXY: CPT

## 2024-02-16 PROCEDURE — 84443 ASSAY THYROID STIM HORMONE: CPT

## 2024-02-16 PROCEDURE — 85025 COMPLETE CBC W/AUTO DIFF WBC: CPT

## 2024-02-16 PROCEDURE — 36415 COLL VENOUS BLD VENIPUNCTURE: CPT

## 2024-02-16 PROCEDURE — 80053 COMPREHEN METABOLIC PANEL: CPT

## 2024-02-16 PROCEDURE — 80061 LIPID PANEL: CPT

## 2024-02-16 NOTE — TELEPHONE ENCOUNTER
----- Message from Chinmay Segura LPN sent at 2/15/2024  9:59 AM CST -----  Regarding: cesia barrientos 2/23 @10:20  Are there any outstanding tasks in patient chart? Needs fasting labs    Is there documentation of outstanding tasks in patient chart? no    Has patient been to the ER, urgent care, or another physician since last visit?    Has patient done any blood work or x-rays since last visit?    5. PLEASE HAVE PATIENT BRING MEDICATION LIST OR BOTTLES TO EVERY OFFICE VISIT

## 2024-02-19 ENCOUNTER — LAB VISIT (OUTPATIENT)
Dept: LAB | Facility: HOSPITAL | Age: 84
End: 2024-02-19
Attending: INTERNAL MEDICINE
Payer: MEDICARE

## 2024-02-19 DIAGNOSIS — R30.0 DYSURIA: Primary | ICD-10-CM

## 2024-02-19 DIAGNOSIS — R30.0 DYSURIA: ICD-10-CM

## 2024-02-19 LAB
APPEARANCE UR: CLEAR
BACTERIA #/AREA URNS AUTO: ABNORMAL /HPF
BILIRUB UR QL STRIP.AUTO: NEGATIVE
COLOR UR AUTO: ABNORMAL
GLUCOSE UR QL STRIP.AUTO: NORMAL
KETONES UR QL STRIP.AUTO: ABNORMAL
LEUKOCYTE ESTERASE UR QL STRIP.AUTO: 75
MUCOUS THREADS URNS QL MICRO: ABNORMAL /LPF
NITRITE UR QL STRIP.AUTO: NEGATIVE
PH UR STRIP.AUTO: 5 [PH]
PROT UR QL STRIP.AUTO: NEGATIVE
RBC #/AREA URNS AUTO: ABNORMAL /HPF
RBC UR QL AUTO: ABNORMAL
SP GR UR STRIP.AUTO: 1.02 (ref 1–1.03)
SQUAMOUS #/AREA URNS LPF: ABNORMAL /HPF
UROBILINOGEN UR STRIP-ACNC: NORMAL
WBC #/AREA URNS AUTO: ABNORMAL /HPF

## 2024-02-19 PROCEDURE — 81001 URINALYSIS AUTO W/SCOPE: CPT

## 2024-02-20 ENCOUNTER — DOCUMENTATION ONLY (OUTPATIENT)
Dept: INTERNAL MEDICINE | Facility: CLINIC | Age: 84
End: 2024-02-20
Payer: MEDICARE

## 2024-02-23 ENCOUNTER — OFFICE VISIT (OUTPATIENT)
Dept: INTERNAL MEDICINE | Facility: CLINIC | Age: 84
End: 2024-02-23
Payer: MEDICARE

## 2024-02-23 VITALS
HEART RATE: 69 BPM | BODY MASS INDEX: 30.66 KG/M2 | WEIGHT: 184 LBS | DIASTOLIC BLOOD PRESSURE: 80 MMHG | HEIGHT: 65 IN | RESPIRATION RATE: 18 BRPM | SYSTOLIC BLOOD PRESSURE: 124 MMHG

## 2024-02-23 DIAGNOSIS — I10 PRIMARY HYPERTENSION: ICD-10-CM

## 2024-02-23 DIAGNOSIS — Z00.00 ANNUAL PHYSICAL EXAM: ICD-10-CM

## 2024-02-23 DIAGNOSIS — I70.0 AORTIC ATHEROSCLEROSIS: ICD-10-CM

## 2024-02-23 DIAGNOSIS — I48.92 ATRIAL FLUTTER, UNSPECIFIED TYPE: Primary | ICD-10-CM

## 2024-02-23 PROCEDURE — G0439 PPPS, SUBSEQ VISIT: HCPCS | Mod: ,,, | Performed by: INTERNAL MEDICINE

## 2024-02-23 NOTE — PROGRESS NOTES
Patient ID: Sumaya Moss is a 83 y.o. female.    Chief Complaint: Medicare AWV (Labs 2/16)      Patient Care Team:  Benedict Jackson II, MD as PCP - General (Internal Medicine)     HPI:   Patient presents here today for above reason.     Ms. Garland 83-year-old female presents today in follow-up annual visit history of atrial flutter on anticoagulation she is currently rate controlled followed by Cardiology.  Cholesterol is at goal on statin therapy.  Blood pressure is under control with current therapy as well.  No further falls she did have an accidental fall sometime ago with a humeral fracture status post ORIF in [facility].  Otherwise ADLs are intact doing fairly well screening up-to-date here for follow-up    The patient's Health Maintenance was reviewed and the following appears to be due at this time:   Health Maintenance Due   Topic Date Due    Shingles Vaccine (1 of 2) Never done    RSV Vaccine (Age 60+ and Pregnant patients) (1 - 1-dose 60+ series) Never done    Pneumococcal Vaccines (Age 65+) (2 of 2 - PCV) 12/31/2020        Past Medical History:  Past Medical History:   Diagnosis Date    Coronary artery disease     Fractures     Hypercholesterolemia     Hypertension     Respiratory distress      Past Surgical History:   Procedure Laterality Date    ABLATION      BRAIN SURGERY      aneurysm    COLONOSCOPY      CYSTOSCOPY W/ URETERAL STENT PLACEMENT Left 7/5/2023    Procedure: CYSTOSCOPY, WITH URETERAL STENT INSERTION;  Surgeon: Viet Barkley MD;  Location: [facility];  Service: Urology;  Laterality: Left;  LEFT    HYSTERECTOMY      INTRAMEDULLARY RODDING OF HUMERUS Left 10/26/2022    Procedure: INSERTION, INTRAMEDULLARY LEATHA, HUMERUS;  Surgeon: Doc Scherer DO;  Location: [facility];  Service: Orthopedics;  Laterality: Left;     Review of patient's allergies indicates:  No Known Allergies  Current Outpatient Medications on File Prior to Visit   Medication Sig Dispense Refill    atorvastatin  (LIPITOR) 10 MG tablet Take 10 mg by mouth once daily.      doxazosin (CARDURA) 4 MG tablet Take 4 mg by mouth once daily.      flecainide (TAMBOCOR) 50 MG Tab Take 50 mg by mouth 2 (two) times a day.      losartan (COZAAR) 50 MG tablet Take 50 mg by mouth 2 (two) times a day.      metoprolol succinate (TOPROL-XL) 100 MG 24 hr tablet 100 mg once daily.      XARELTO 20 mg Tab Take 20 mg by mouth once daily.      [DISCONTINUED] hyoscyamine (LEVSIN/SL) 0.125 mg Subl Place 1 tablet (0.125 mg total) under the tongue every 4 (four) hours as needed (spasms). (Patient not taking: Reported on 7/10/2023) 3 tablet 0    [DISCONTINUED] ondansetron (ZOFRAN-ODT) 4 MG TbDL Take 1 tablet (4 mg total) by mouth every 6 (six) hours as needed (nausea/vomiting). (Patient not taking: Reported on 7/10/2023) 20 tablet 0    [DISCONTINUED] tamsulosin (FLOMAX) 0.4 mg Cap Take 1 capsule (0.4 mg total) by mouth once daily. for 14 days (Patient not taking: Reported on 7/10/2023) 14 capsule 0    [DISCONTINUED] vitamin D (VITAMIN D3) 1000 units Tab Take 1,000 Units by mouth once daily.       No current facility-administered medications on file prior to visit.     Social History     Socioeconomic History    Marital status:    Tobacco Use    Smoking status: Never     Passive exposure: Never    Smokeless tobacco: Never   Substance and Sexual Activity    Alcohol use: Never    Drug use: Never    Sexual activity: Yes   Social History Narrative    ** Merged History Encounter **          Family History   Problem Relation Age of Onset    No Known Problems Mother     No Known Problems Father        ROS:   Review of Systems  Constitutional: No weight gain, No fever, No chills, No fatigue.   Eyes: No blurring, No visual disturbances.   Ear/Nose/Mouth/Throat: No decreased hearing, No ear pain, No nasal congestion, No sore throat.   Respiratory: No shortness of breath, No cough, No wheezing.   Cardiovascular: No chest pain, No palpitations, No peripheral  edema.   Gastrointestinal: No nausea, No vomiting, No diarrhea, No constipation, No abdominal pain.   Genitourinary: No dysuria, No hematuria.   Hematology/Lymphatics: No bruising tendency, No bleeding tendency, No swollen lymph glands.   Endocrine: No excessive thirst, No polyuria, No excessive hunger.   Musculoskeletal: No joint pain, No muscle pain, No decreased range of motion.   Integumentary: No rash, No pruritus.   Neurologic: No abnormal balance, No confusion, No headache.   Psychiatric: No anxiety, No depression, Not suicidal, No hallucinations.        Patient Reported Health Risk Assessment  What is your age?: 80 or older  Are you male or female?: Female  During the past four weeks, how much have you been bothered by emotional problems such as feeling anxious, depressed, irritable, sad, or downhearted and blue?: Not at all  During the past five weeks, has your physical and/or emotional health limited your social activities with family, friends, neighbors, or groups?: Not at all  During the past four weeks, how much bodily pain have you generally had?: No pain  During the past four weeks, was someone available to help if you needed and wanted help?: Yes, as much as I wanted  During the past four weeks, what was the hardest physical activity you could do for at least two minutes?: Moderate  Can you get to places out of walking distance without help?  (For example, can you travel alone on buses or taxis, or drive your own car?): Yes  Can you go shopping for groceries or clothes without someone's help?: Yes  Can you prepare your own meals?: Yes  Can you do your own housework without help?: Yes  Because of any health problems, do you need the help of another person with your personal care needs such as eating, bathing, dressing, or getting around the house?: No  Can you handle your own money without help?: Yes  During the past four weeks, how would you rate your health in general?: Excellent  How have things  "been going for you during the past four weeks?: Very well  Are you having difficulties driving your car?: No  Do you always fasten your seat belt when you are in a car?: Yes, usually  How often in the past four weeks have you been bothered by falling or dizzy when standing up?: Never  How often in the past four weeks have you been bothered by sexual problems?: Never  How often in the past four weeks have you been bothered by trouble eating well?: Never  How often in the past four weeks have you been bothered by teeth or denture problems?: Never  How often in the past four weeks have you been bothered with problems using the telephone?: Never  How often in the past four weeks have you been bothered by tiredness or fatigue?: Never  Have you fallen two or more times in the past year?: No  Are you afraid of falling?: No  Are you a smoker?: No  During the past four weeks, how many drinks of wine, beer, or other alcoholic beverages did you have?: No alcohol at all  Do you exercise for about 20 minutes three or more days a week?: Yes, some of the time  Have you been given any information to help you with hazards in your house that might hurt you?: Yes  Have you been given any information to help you with keeping track of your medications?: Yes  How often do you have trouble taking medicines the way you've been told to take them?: I always take them as prescribed  How confident are you that you can control and manage most of your health problems?: Very confident  What is your race? (Check all that apply.):     Vitals/PE:   /80 (BP Location: Left arm, Patient Position: Sitting)   Pulse 69   Resp 18   Ht 5' 5" (1.651 m)   Wt 83.5 kg (184 lb)   BMI 30.62 kg/m²   Physical Exam    General: Alert and oriented, No acute distress.   Eye: Normal conjunctiva without exudate.  HENMT: Normocephalic/AT, Normal hearing, Oral mucosa is moist and pink   Neck: No goiter visualized.   Respiratory: Lungs CTAB, Respirations " "are non-labored, Breath sounds are equal, Symmetrical chest wall expansion.  Cardiovascular: Normal rate, Regular rhythm, No murmur, No edema.   Gastrointestinal: Non-distended.   Genitourinary: Deferred.  Musculoskeletal: Normal ROM, Normal gait, No deformities or amputations.  Integumentary: Warm, Dry, Intact. No diaphoresis, or flushing.  Neurologic: No focal deficits, Cranial Nerves II-XII are grossly intact.   Psychiatric: Cooperative, Appropriate mood & affect, Normal judgment, Non-suicidal.             No data to display                  2/23/2024    10:20 AM 7/13/2023     3:20 PM 3/6/2023     8:00 AM 2/7/2023     9:00 AM 1/5/2023     8:00 AM 11/16/2022     8:00 AM 10/10/2022     9:30 AM   Fall Risk Assessment - Outpatient   Mobility Status Ambulatory Ambulatory Wheelchair Bound Ambulatory Wheelchair Bound Wheelchair Bound Wheelchair Bound   Number of falls 0 0 0 0 0 1 with injury 1 with injury   Identified as fall risk False False True False True True True           Depression Screening  Over the past two weeks, has the patient felt down, depressed, or hopeless?: No  Over the past two weeks, has the patient felt little interest or pleasure in doing things?: No  Functional Ability/Safety Screening  Was the patient's timed Up & Go test unsteady or longer than 30 seconds?: No  Does the patient need help with phone, transportation, shopping, preparing meals, housework, laundry, meds, or managing money?: No  Does the patient's home have rugs in the hallway, lack grab bars in the bathroom, lack handrails on the stairs or have poor lighting?: No  Have you noticed any hearing difficulties?: No  A "Yes" response to any of the above questions should trigger further investigation.: 0  Cognitive Function (Assessed through direct observation with due consideration of information obtained by way of patient reports and/or concerns raised by family, friends, caretakers, or others)    Does the patient repeat " "questions/statements in the same day?: No  Does the patient have trouble remembering the date, year, and time?: No  Does the patient have difficulty managing finances?: No  Does the patient have a decreased sense of direction?: No  A "Yes" response to any of the above questions could indicate mild cognitive impairment and should trigger further investigation.: 0    Assessment/Plan:       1. Atrial flutter, unspecified type    2. Aortic atherosclerosis    3. Primary hypertension    4. Annual physical exam         Plan:  Labs are within normal limits continue anticoagulation fall precautions if she continues to fall may have to discontinue oral anticoagulation.  Restart vitamin-D her vitamin-D levels were in the 20s.  Blood pressure is under control cholesterol is under control age-appropriate screening up-to-date here for annual actually doing fairly well continue current therapy return to clinic in 6 months to 1 year      Education and counseling done face to face regarding medical conditions and plan. Contact office if new symptoms develop. Should any symptoms ever significantly worsen seek emergency medical attention/go to ER. Follow up at least yearly for wellness or sooner PRN. Nurse to call patient with any results. The patient is receptive, expresses understanding and is agreeable to plan. All questions have been answered.    No follow-ups on file.      Medicare Annual Wellness and Personalized Prevention Plan:   Fall Risk + Home Safety + Hearing Impairment + Depression Screen + Cognitive Impairment Screen + Health Risk Assessment all reviewed.    Advance Care Planning   I attest to discussing Advance Care Planning with patient and/or family member.  Education was provided including the importance of the Health Care Power of , Advance Directives, and/or [place] documentation.  The patient expressed understanding to the importance of this information and discussion.  Length of ACP conversation in " minutes:          Opioid Screening: Patient medication list reviewed, patient is not taking prescription opioids. Patient is not using additional opioids than prescribed. Patient is at low risk of substance abuse based on this opioid use history.

## 2024-04-02 ENCOUNTER — TELEPHONE (OUTPATIENT)
Dept: INTERNAL MEDICINE | Facility: CLINIC | Age: 84
End: 2024-04-02
Payer: MEDICARE

## 2024-04-02 ENCOUNTER — HOSPITAL ENCOUNTER (OUTPATIENT)
Dept: RADIOLOGY | Facility: HOSPITAL | Age: 84
Discharge: HOME | End: 2024-04-02
Attending: INTERNAL MEDICINE
Payer: MEDICARE

## 2024-04-02 DIAGNOSIS — Z91.81 HISTORY OF RECENT FALL: ICD-10-CM

## 2024-04-02 DIAGNOSIS — Z91.81 HISTORY OF RECENT FALL: Primary | ICD-10-CM

## 2024-04-02 PROCEDURE — 73562 X-RAY EXAM OF KNEE 3: CPT | Mod: TC,50

## 2024-04-02 NOTE — TELEPHONE ENCOUNTER
Pt fell 5 days ago and hurt both of her knees. States she is still in pain and can't kneel . She has been icing, not taking any medication. Requesting xrays of both knees. Please advise

## 2024-04-02 NOTE — TELEPHONE ENCOUNTER
----- Message from Jairo Hodge sent at 4/2/2024 10:15 AM CDT -----  .Type:  Needs Medical Advice    Who Called: Dale  Symptoms (please be specific):    How long has patient had these symptoms:    Pharmacy name and phone #:    Would the patient rather a call back or a response via [facility]?   Best Call Back Number: [phone]  Additional Information: Patient requested to speak with the nurse re: speak with the nurse directly she fall and needed to know if the doctor needs to schedule an xray.

## 2024-04-10 ENCOUNTER — TELEPHONE (OUTPATIENT)
Dept: INTERNAL MEDICINE | Facility: CLINIC | Age: 84
End: 2024-04-10
Payer: MEDICARE

## 2024-04-10 NOTE — TELEPHONE ENCOUNTER
Leanna Boyle Staff  Caller: Unspecified (Today, 12:24 PM)  Type:  Needs Medical Advice    Who Called: Sumaya    Symptoms (please be specific): aches and pain from fall a few weeks ago    Would the patient rather a call back or a response via [facility]? Call back    Best Call Back Number: [phone]    Additional Information: Sumaya is requesting a  call back to get more orders to do xrays. She still experiencing pain in all areas

## 2024-04-10 NOTE — TELEPHONE ENCOUNTER
-- DO NOT REPLY / DO NOT REPLY ALL --  -- Message is from [facility] [employer] ([facility]) --    ONLY TO BE USED WITHIN A REFILL MEDICATION ENCOUNTER    Med Refill  Is the patient currently having any symptoms?: No/Non-Emergent symptoms    Name of medication requested: See pended med    Has patient contacted the pharmacy? Yes    Is this the first request for the medication in the last 48 hours?: Yes    Patient is requesting a medication refill - medication is on active medication list    Patient is currently OUT of the requested medication - sent as HIGH priority      Full name of the provider who ordered the medication: [facility] site name / Account # for provider: [facility]    Preferred Pharmacy: Pharmacy  [facility] - [facility] [address]    Patient confirmed the above pharmacy as correct?  Yes      Caller Information       Type Contact Phone/Fax    01/09/2023 02:15 PM CST Phone (Incoming) Noe Pagan Sr. (Self) [phone] (H)     OK TO LEAVE A MESSAGE          Alternative phone number: N/A    Can a detailed message be left?: Yes    Patient is completely out of medication: Verify if patient is currently experiencing symptoms. If patient is symptomatic, proceed with front end triage instead of medication refill. If patient is not symptomatic but is completely out of medication, sameer as High priority when routing. Inform patient: “Please call back with any questions or concerns and if your condition becomes life threatening, you should seek immediate medical assistance by calling 911 or going to the Emergency Department for evaluation.”    Inform all patients: \"If the clinical team needs to contact you regarding this refill, please be aware the return phone call may come from an unidentified or out of state phone number and your refill request will be addressed as soon as the clinical team reviews your message.\"   Per MD pt needs to come for OV for evaluation. Pt notified and voiced understanding. Transferred to 1905 to schedule.

## 2024-04-10 NOTE — PROGRESS NOTES
"Subjective:      Patient ID: Sumaya Moss is a 83 y.o. female.    Chief Complaint: Back Pain (Lower back pain/Shooting pain in legs, knees and back /Had a fall 3/28 )      HPI: Patient here today for eval s/p fall. She did call office on 4/2 with request for XR eval. Slip-fall noted 3/28. She was not ambulating with walker at that time. She states that she hit the L side of her head and L hand. OAC Xarelto.  Denies LOC, NV, or dizziness.  She reports persistent, intermittent headaches since fall.  L hand pain resolved. Currently, she is having low back pain radiating into cynthia legs with occ spasms and cynthia knee pain. Dr. Jackson ordered XR cynthia knees done/reviewed from 4/2, which were negative. She continues to have pain. Taking Tylenol periodically.     Review of patient's allergies indicates:  No Known Allergies    Review of Systems  Constitutional: No fatigue, No weight loss.  Eyes: No blurring.  Respiratory: No shortness of breath, No exertional dyspnea.   Cardiovascular: No chest pain, No palpitations  Musculoskeletal: LBP with radiation into leg, L head abrasion, bilateral knee pain  Integumentary: No rash, No ecchymosis.  Neurologic: No altered mental status, headaches.    Objective:   Visit Vitals  /78 (BP Location: Right arm, Patient Position: Sitting, BP Method: Medium (Manual))   Pulse 63   Resp 16   Ht 5' 5" (1.651 m)   Wt 83.9 kg (185 lb)   SpO2 95%   BMI 30.79 kg/m²     The patient's weight trend is below:   Wt Readings from Last 4 Encounters:   04/11/24 83.9 kg (185 lb)   02/23/24 83.5 kg (184 lb)   07/13/23 83 kg (183 lb)   07/05/23 79.4 kg (175 lb)        Physical Exam  Vitals and nursing note reviewed.   Constitutional:       General: She is not in acute distress.     Appearance: Normal appearance. She is normal weight. She is not ill-appearing, toxic-appearing or diaphoretic.   HENT:      Head: Normocephalic and atraumatic.   Cardiovascular:      Rate and Rhythm: Normal rate. Rhythm " irregular.      Heart sounds: Normal heart sounds.   Pulmonary:      Effort: Pulmonary effort is normal.      Breath sounds: Normal breath sounds.   Musculoskeletal:      Cervical back: No bony tenderness.      Thoracic back: No bony tenderness.      Lumbar back: No bony tenderness. Positive right straight leg raise test (worse) and positive left straight leg raise test.   Skin:     General: Skin is warm and dry.   Neurological:      General: No focal deficit present.      Mental Status: She is alert and oriented to person, place, and time. Mental status is at baseline.         Assessment/Plan:   1. Fall, initial encounter  Advised use of walker at ALL times  Eval with CT head and L spine  Discussed possible need for HH PT/OT-she declined at this time    - CT Head Without Contrast; Future  - X-Ray Lumbar Spine 5 View; Future    2. Acute bilateral low back pain with bilateral sciatica  See #1  Tylenol Arthritis 2 tabs 3x/day as needed for pain  Ice/heat  Avoid NSAIDs    - X-Ray Lumbar Spine 5 View; Future  - acetaminophen (TYLENOL) 650 MG TbSR; Take 1 tablet (650 mg total) by mouth every 8 (eight) hours as needed (pain).  Dispense: 30 tablet; Refill: 0    3. Contusion of scalp, initial encounter  Eval with CT  Advised to report to ER if AMS  OAC Xarelto 20mg daily    - CT Head Without Contrast; Future  - acetaminophen (TYLENOL) 650 MG TbSR; Take 1 tablet (650 mg total) by mouth every 8 (eight) hours as needed (pain).  Dispense: 30 tablet; Refill: 0    4. Contusion of left hand, initial encounter  S/s of pain resolved  Tylenol as needed  Consider further eval with XR if needed    - acetaminophen (TYLENOL) 650 MG TbSR; Take 1 tablet (650 mg total) by mouth every 8 (eight) hours as needed (pain).  Dispense: 30 tablet; Refill: 0    5. On continuous oral anticoagulation  Stable on Xarelto 20mg daily for h/o A fib  Advised to report to ER if AMS    - CT Head Without Contrast; Future    6. Cerebral aneurysm,  "nonruptured  Reviewed CT done Aug '22    - CT Head Without Contrast; Future    7. Acute nonintractable headache, unspecified headache type  See #1    - CT Head Without Contrast; Future  - acetaminophen (TYLENOL) 650 MG TbSR; Take 1 tablet (650 mg total) by mouth every 8 (eight) hours as needed (pain).  Dispense: 30 tablet; Refill: 0    8. Chronic pain of both knees  Offered PT/OT-declined today  Consider referral to Ortho as well    - acetaminophen (TYLENOL) 650 MG TbSR; Take 1 tablet (650 mg total) by mouth every 8 (eight) hours as needed (pain).  Dispense: 30 tablet; Refill: 0    9. Imbalance  See #1 and 8      Medication List with Changes/Refills   New Medications    ACETAMINOPHEN (TYLENOL) 650 MG TBSR    Take 1 tablet (650 mg total) by mouth every 8 (eight) hours as needed (pain).   Current Medications    ATORVASTATIN (LIPITOR) 10 MG TABLET    Take 10 mg by mouth once daily.    DOXAZOSIN (CARDURA) 4 MG TABLET    Take 4 mg by mouth once daily.    FLECAINIDE (TAMBOCOR) 50 MG TAB    Take 50 mg by mouth 2 (two) times a day.    LOSARTAN (COZAAR) 50 MG TABLET    Take 50 mg by mouth 2 (two) times a day.    METOPROLOL SUCCINATE (TOPROL-XL) 100 MG 24 HR TABLET    100 mg once daily.    XARELTO 20 MG TAB    Take 20 mg by mouth once daily.        No follow-ups on file.    Chemistry:  Lab Results   Component Value Date     02/16/2024    K 4.5 02/16/2024    CHLORIDE 110 (H) 02/16/2024    BUN 21.1 (H) 02/16/2024    CREATININE 0.84 02/16/2024    EGFRNORACEVR >60 02/16/2024    GLUCOSE 97 02/16/2024    CALCIUM 9.4 02/16/2024    ALKPHOS 130 02/16/2024    LABPROT 6.3 02/16/2024    ALBUMIN 3.5 02/16/2024    BILIDIR 0.2 05/05/2022    IBILI 0.30 05/05/2022    AST 15 02/16/2024    ALT 13 02/16/2024    MMUMZIVF96NE 26.8 (L) 02/16/2024        No results found for: "HGBA1C", "MICROALBCREA"     Hematology:  Lab Results   Component Value Date    WBC 8.06 02/16/2024    HGB 13.5 02/16/2024    HCT 42.1 02/16/2024     02/16/2024 "       Lipid Panel:  Lab Results   Component Value Date    CHOL 131 02/16/2024    HDL 53 02/16/2024    LDL 65.00 02/16/2024    TRIG 63 02/16/2024    TOTALCHOLEST 2 02/16/2024        Urine:  Lab Results   Component Value Date    COLORUA Light-Yellow 02/19/2024    APPEARANCEUA Clear 02/19/2024    SGUA 1.025 02/19/2024    PHUA 5.0 02/19/2024    PROTEINUA Negative 02/19/2024    GLUCOSEUA Normal 02/19/2024    KETONESUA Trace (A) 02/19/2024    BLOODUA 2+ (A) 02/19/2024    NITRITESUA Negative 02/19/2024    LEUKOCYTESUR 75 (A) 02/19/2024    RBCUA 0-5 02/19/2024    WBCUA 6-10 (A) 02/19/2024    BACTERIA Trace 02/19/2024    SQEPUA None Seen 02/19/2024

## 2024-04-10 NOTE — TELEPHONE ENCOUNTER
----- Message from Ying Núñez sent at 4/10/2024  1:33 PM CDT -----  Regarding: tahir  Spoke to pt scheduled an appointment with Shikha for tomorrow morning, she voiced understanding

## 2024-04-11 ENCOUNTER — OFFICE VISIT (OUTPATIENT)
Dept: INTERNAL MEDICINE | Facility: CLINIC | Age: 84
End: 2024-04-11
Payer: MEDICARE

## 2024-04-11 ENCOUNTER — HOSPITAL ENCOUNTER (INPATIENT)
Facility: HOSPITAL | Age: 84
LOS: 1 days | Discharge: HOME | DRG: 086 | End: 2024-04-12
Attending: EMERGENCY MEDICINE | Admitting: SURGERY
Payer: MEDICARE

## 2024-04-11 VITALS
SYSTOLIC BLOOD PRESSURE: 122 MMHG | HEART RATE: 63 BPM | OXYGEN SATURATION: 95 % | BODY MASS INDEX: 30.82 KG/M2 | HEIGHT: 65 IN | DIASTOLIC BLOOD PRESSURE: 78 MMHG | RESPIRATION RATE: 16 BRPM | WEIGHT: 185 LBS

## 2024-04-11 DIAGNOSIS — M54.41 ACUTE BILATERAL LOW BACK PAIN WITH BILATERAL SCIATICA: ICD-10-CM

## 2024-04-11 DIAGNOSIS — S00.03XA CONTUSION OF SCALP, INITIAL ENCOUNTER: ICD-10-CM

## 2024-04-11 DIAGNOSIS — S06.300A TRAUMATIC INTRACRANIAL HEMORRHAGE WITHOUT LOSS OF CONSCIOUSNESS, INITIAL ENCOUNTER: Primary | ICD-10-CM

## 2024-04-11 DIAGNOSIS — S60.222A CONTUSION OF LEFT HAND, INITIAL ENCOUNTER: ICD-10-CM

## 2024-04-11 DIAGNOSIS — S00.12XA CONTUSION OF LEFT EYEBROW, INITIAL ENCOUNTER: ICD-10-CM

## 2024-04-11 DIAGNOSIS — Z79.01 ON CONTINUOUS ORAL ANTICOAGULATION: ICD-10-CM

## 2024-04-11 DIAGNOSIS — M25.562 CHRONIC PAIN OF BOTH KNEES: ICD-10-CM

## 2024-04-11 DIAGNOSIS — M25.561 CHRONIC PAIN OF BOTH KNEES: ICD-10-CM

## 2024-04-11 DIAGNOSIS — I67.1 CEREBRAL ANEURYSM, NONRUPTURED: ICD-10-CM
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
## 2024-04-11 DIAGNOSIS — R26.89 IMBALANCE: ICD-10-CM

## 2024-04-11 DIAGNOSIS — R51.9 ACUTE NONINTRACTABLE HEADACHE, UNSPECIFIED HEADACHE TYPE: ICD-10-CM

## 2024-04-11 DIAGNOSIS — M54.9 UPPER BACK PAIN ON LEFT SIDE: ICD-10-CM

## 2024-04-11 DIAGNOSIS — Z79.01 ANTICOAGULATED BY ANTICOAGULATION TREATMENT: ICD-10-CM

## 2024-04-11 DIAGNOSIS — M54.50 ACUTE BILATERAL LOW BACK PAIN WITHOUT SCIATICA: ICD-10-CM

## 2024-04-11 DIAGNOSIS — M54.42 ACUTE BILATERAL LOW BACK PAIN WITH BILATERAL SCIATICA: ICD-10-CM

## 2024-04-11 DIAGNOSIS — G89.29 CHRONIC PAIN OF BOTH KNEES: ICD-10-CM

## 2024-04-11 PROBLEM — I60.9 INTRACRANIAL SUBARACHNOID HEMORRHAGE: Status: ACTIVE | Noted: 2024-04-11

## 2024-04-11 PROBLEM — M19.90 OSTEOARTHRITIS: Status: ACTIVE | Noted: 2024-04-11

## 2024-04-11 PROBLEM — I62.9 INTRACRANIAL HEMORRHAGE: Status: ACTIVE | Noted: 2024-04-11

## 2024-04-11 PROBLEM — I48.91 ATRIAL FIBRILLATION: Status: ACTIVE | Noted: 2024-04-11

## 2024-04-11 LAB
ALBUMIN SERPL-MCNC: 3.7 G/DL (ref 3.4–4.8)
ALBUMIN/GLOB SERPL: 1 RATIO (ref 1.1–2)
ALP SERPL-CCNC: 132 UNIT/L (ref 40–150)
ALT SERPL-CCNC: 8 UNIT/L (ref 0–55)
APTT PPP: 52.7 SECONDS (ref 23.2–33.7)
AST SERPL-CCNC: 10 UNIT/L (ref 5–34)
BASOPHILS # BLD AUTO: 0.03 X10(3)/MCL
BASOPHILS NFR BLD AUTO: 0.4 %
BILIRUB SERPL-MCNC: 0.6 MG/DL
BUN SERPL-MCNC: 13.2 MG/DL (ref 9.8–20.1)
CALCIUM SERPL-MCNC: 10 MG/DL (ref 8.4–10.2)
CHLORIDE SERPL-SCNC: 105 MMOL/L (ref 98–107)
CO2 SERPL-SCNC: 26 MMOL/L (ref 23–31)
CREAT SERPL-MCNC: 0.74 MG/DL (ref 0.55–1.02)
EOSINOPHIL # BLD AUTO: 0.16 X10(3)/MCL (ref 0–0.9)
EOSINOPHIL NFR BLD AUTO: 1.9 %
ERYTHROCYTE [DISTWIDTH] IN BLOOD BY AUTOMATED COUNT: 13.1 % (ref 11.5–17)
GFR SERPLBLD CREATININE-BSD FMLA CKD-EPI: >60 MLS/MIN/1.73/M2
GLOBULIN SER-MCNC: 3.6 GM/DL (ref 2.4–3.5)
GLUCOSE SERPL-MCNC: 102 MG/DL (ref 82–115)
GROUP & RH: NORMAL
HCT VFR BLD AUTO: 43.6 % (ref 37–47)
HGB BLD-MCNC: 14.1 G/DL (ref 12–16)
IMM GRANULOCYTES # BLD AUTO: 0.02 X10(3)/MCL (ref 0–0.04)
IMM GRANULOCYTES NFR BLD AUTO: 0.2 %
INDIRECT COOMBS: NORMAL
INR PPP: 2.3
LYMPHOCYTES # BLD AUTO: 1.79 X10(3)/MCL (ref 0.6–4.6)
LYMPHOCYTES NFR BLD AUTO: 21.7 %
MCH RBC QN AUTO: 31 PG (ref 27–31)
MCHC RBC AUTO-ENTMCNC: 32.3 G/DL (ref 33–36)
MCV RBC AUTO: 95.8 FL (ref 80–94)
MONOCYTES # BLD AUTO: 0.92 X10(3)/MCL (ref 0.1–1.3)
MONOCYTES NFR BLD AUTO: 11.2 %
NEUTROPHILS # BLD AUTO: 5.32 X10(3)/MCL (ref 2.1–9.2)
NEUTROPHILS NFR BLD AUTO: 64.6 %
NRBC BLD AUTO-RTO: 0 %
PLATELET # BLD AUTO: 180 X10(3)/MCL (ref 130–400)
PMV BLD AUTO: 9.5 FL (ref 7.4–10.4)
POTASSIUM SERPL-SCNC: 4 MMOL/L (ref 3.5–5.1)
PROT SERPL-MCNC: 7.3 GM/DL (ref 5.8–7.6)
PROTHROMBIN TIME: 25 SECONDS (ref 12.5–14.5)
RBC # BLD AUTO: 4.55 X10(6)/MCL (ref 4.2–5.4)
SODIUM SERPL-SCNC: 141 MMOL/L (ref 136–145)
SPECIMEN OUTDATE: NORMAL
WBC # SPEC AUTO: 8.24 X10(3)/MCL (ref 4.5–11.5)

## 2024-04-11 PROCEDURE — 25000003 PHARM REV CODE 250

## 2024-04-11 PROCEDURE — 85025 COMPLETE CBC W/AUTO DIFF WBC: CPT | Performed by: EMERGENCY MEDICINE

## 2024-04-11 PROCEDURE — 99223 1ST HOSP IP/OBS HIGH 75: CPT | Mod: ,,, | Performed by: STUDENT IN AN ORGANIZED HEALTH CARE EDUCATION/TRAINING PROGRAM

## 2024-04-11 PROCEDURE — 99285 EMERGENCY DEPT VISIT HI MDM: CPT | Mod: 25

## 2024-04-11 PROCEDURE — 11000001 HC ACUTE MED/SURG PRIVATE ROOM

## 2024-04-11 PROCEDURE — 86850 RBC ANTIBODY SCREEN: CPT | Performed by: EMERGENCY MEDICINE

## 2024-04-11 PROCEDURE — 85730 THROMBOPLASTIN TIME PARTIAL: CPT | Performed by: EMERGENCY MEDICINE

## 2024-04-11 PROCEDURE — 99214 OFFICE O/P EST MOD 30 MIN: CPT | Mod: ,,, | Performed by: NURSE PRACTITIONER

## 2024-04-11 PROCEDURE — 63600175 PHARM REV CODE 636 W HCPCS

## 2024-04-11 PROCEDURE — 25500020 PHARM REV CODE 255: Performed by: SURGERY

## 2024-04-11 PROCEDURE — 25000003 PHARM REV CODE 250: Performed by: NURSE PRACTITIONER

## 2024-04-11 PROCEDURE — 85610 PROTHROMBIN TIME: CPT | Performed by: EMERGENCY MEDICINE

## 2024-04-11 PROCEDURE — 80053 COMPREHEN METABOLIC PANEL: CPT | Performed by: EMERGENCY MEDICINE

## 2024-04-11 RX ORDER — HYDRALAZINE HYDROCHLORIDE 20 MG/ML
10 INJECTION INTRAMUSCULAR; INTRAVENOUS EVERY 6 HOURS PRN
Status: DISCONTINUED | OUTPATIENT
Start: 2024-04-11 | End: 2024-04-12 | Stop reason: HOSPADM

## 2024-04-11 RX ORDER — GABAPENTIN 300 MG/1
300 CAPSULE ORAL 3 TIMES DAILY
Status: DISCONTINUED | OUTPATIENT
Start: 2024-04-11 | End: 2024-04-12 | Stop reason: HOSPADM

## 2024-04-11 RX ORDER — OXYCODONE HYDROCHLORIDE 5 MG/1
5 TABLET ORAL EVERY 4 HOURS PRN
Status: DISCONTINUED | OUTPATIENT
Start: 2024-04-11 | End: 2024-04-12 | Stop reason: HOSPADM

## 2024-04-11 RX ORDER — ACETAMINOPHEN 325 MG/1
650 TABLET ORAL EVERY 4 HOURS
Status: DISCONTINUED | OUTPATIENT
Start: 2024-04-11 | End: 2024-04-12 | Stop reason: HOSPADM

## 2024-04-11 RX ORDER — TALC
6 POWDER (GRAM) TOPICAL NIGHTLY PRN
Status: DISCONTINUED | OUTPATIENT
Start: 2024-04-11 | End: 2024-04-12 | Stop reason: HOSPADM

## 2024-04-11 RX ORDER — ADHESIVE BANDAGE
30 BANDAGE TOPICAL DAILY PRN
Status: DISCONTINUED | OUTPATIENT
Start: 2024-04-11 | End: 2024-04-12 | Stop reason: HOSPADM

## 2024-04-11 RX ORDER — METHOCARBAMOL 500 MG/1
500 TABLET, FILM COATED ORAL EVERY 8 HOURS
Status: DISCONTINUED | OUTPATIENT
Start: 2024-04-11 | End: 2024-04-12 | Stop reason: HOSPADM

## 2024-04-11 RX ORDER — DEXTROMETHORPHAN HYDROBROMIDE, GUAIFENESIN 5; 100 MG/5ML; MG/5ML
650 LIQUID ORAL EVERY 8 HOURS PRN
Qty: 30 TABLET | Refills: 0
Start: 2024-04-11

## 2024-04-11 RX ORDER — DOCUSATE SODIUM 100 MG/1
100 CAPSULE, LIQUID FILLED ORAL 2 TIMES DAILY
Status: DISCONTINUED | OUTPATIENT
Start: 2024-04-11 | End: 2024-04-12 | Stop reason: HOSPADM

## 2024-04-11 RX ORDER — LEVETIRACETAM 500 MG/1
500 TABLET ORAL 2 TIMES DAILY
Status: DISCONTINUED | OUTPATIENT
Start: 2024-04-11 | End: 2024-04-12 | Stop reason: HOSPADM

## 2024-04-11 RX ORDER — POLYETHYLENE GLYCOL 3350 17 G/17G
17 POWDER, FOR SOLUTION ORAL 2 TIMES DAILY
Status: DISCONTINUED | OUTPATIENT
Start: 2024-04-11 | End: 2024-04-12 | Stop reason: HOSPADM

## 2024-04-11 RX ADMIN — IOHEXOL 93 ML: 350 INJECTION, SOLUTION INTRAVENOUS at 12:04

## 2024-04-11 RX ADMIN — HYDRALAZINE HYDROCHLORIDE 10 MG: 20 INJECTION INTRAMUSCULAR; INTRAVENOUS at 01:04

## 2024-04-11 RX ADMIN — METHOCARBAMOL 500 MG: 500 TABLET ORAL at 01:04

## 2024-04-11 RX ADMIN — OXYCODONE HYDROCHLORIDE 5 MG: 5 TABLET ORAL at 08:04

## 2024-04-11 RX ADMIN — DOCUSATE SODIUM 100 MG: 100 CAPSULE, LIQUID FILLED ORAL at 01:04

## 2024-04-11 RX ADMIN — ACETAMINOPHEN 650 MG: 325 TABLET, FILM COATED ORAL at 05:04

## 2024-04-11 RX ADMIN — LEVETIRACETAM 500 MG: 500 TABLET, FILM COATED ORAL at 08:04

## 2024-04-11 RX ADMIN — DOCUSATE SODIUM 100 MG: 100 CAPSULE, LIQUID FILLED ORAL at 08:04

## 2024-04-11 RX ADMIN — POLYETHYLENE GLYCOL 3350 17 G: 17 POWDER, FOR SOLUTION ORAL at 01:04

## 2024-04-11 RX ADMIN — GABAPENTIN 300 MG: 300 CAPSULE ORAL at 08:04

## 2024-04-11 RX ADMIN — ACETAMINOPHEN 650 MG: 325 TABLET, FILM COATED ORAL at 01:04

## 2024-04-11 NOTE — H&P
Trauma Surgery   History and Physical Note    Patient Name: Sumaya Moss  YOB: 1940  Date: 04/11/2024 1:45 PM  Date of Admission: 4/11/2024  HD#0  POD#* No surgery found *    PRESENTING HISTORY   Chief Complaint/Reason for Admission: Intracranial hemorrhage    History of Present Illness:  Patient is a 83 year old female who reported to the ED following an outpatient CT head that was significant for small ICH. She reports she fell on 3/28 after slipping in her bathroom and landed on her side. She is on Xarelto for Afib. She also reported lower back pain.     Review of Systems:  12 point ROS negative except as stated in HPI    PAST HISTORY:   Past medical history:  Atrial Fib, HTN, HLD  Past Medical History:   Diagnosis Date    Coronary artery disease     Fractures     Hypercholesterolemia     Hypertension     Respiratory distress        Past surgical history:  Past Surgical History:   Procedure Laterality Date    ABLATION      BRAIN SURGERY      aneurysm    COLONOSCOPY      CYSTOSCOPY W/ URETERAL STENT PLACEMENT Left 7/5/2023    Procedure: CYSTOSCOPY, WITH URETERAL STENT INSERTION;  Surgeon: Viet Barkley MD;  Location: [facility];  Service: Urology;  Laterality: Left;  LEFT    HYSTERECTOMY      INTRAMEDULLARY RODDING OF HUMERUS Left 10/26/2022    Procedure: INSERTION, INTRAMEDULLARY LEATHA, HUMERUS;  Surgeon: Doc Scherer DO;  Location: [facility];  Service: Orthopedics;  Laterality: Left;       Family history:  Family History   Problem Relation Age of Onset    No Known Problems Mother     No Known Problems Father        Social history:  Social History     Socioeconomic History    Marital status:    Tobacco Use    Smoking status: Never     Passive exposure: Never    Smokeless tobacco: Never   Substance and Sexual Activity    Alcohol use: Never    Drug use: Never    Sexual activity: Yes   Social History Narrative    ** Merged History Encounter **          Social Determinants of Health      Financial Resource Strain: Low Risk  (4/11/2024)    Overall Financial Resource Strain (CARDIA)     Difficulty of Paying Living Expenses: Not hard at all   Food Insecurity: No Food Insecurity (4/11/2024)    Hunger Vital Sign     Worried About Running Out of Food in the Last Year: Never true     Ran Out of Food in the Last Year: Never true   Transportation Needs: No Transportation Needs (4/11/2024)    PRAPARE - Transportation     Lack of Transportation (Medical): No     Lack of Transportation (Non-Medical): No   Physical Activity: Inactive (4/11/2024)    Exercise Vital Sign     Days of Exercise per Week: 0 days     Minutes of Exercise per Session: 0 min   Stress: No Stress Concern Present (4/11/2024)    [employer] [place] [employer] - Occupational Stress Questionnaire     Feeling of Stress : Not at all   Social Connections: Unknown (4/11/2024)    Social Connection and Isolation Panel [NHANES]     Frequency of Communication with Friends and Family: Patient declined     Frequency of Social Gatherings with Friends and Family: Patient declined     Active Member of Clubs or Organizations: No     Attends Club or Organization Meetings: Never     Marital Status:    Housing Stability: Unknown (4/11/2024)    Housing Stability Vital Sign     Number of Places Lived in the Last Year: 1     Unstable Housing in the Last Year: No     Social History     Tobacco Use   Smoking Status Never    Passive exposure: Never   Smokeless Tobacco Never      Social History     Substance and Sexual Activity   Alcohol Use Never        MEDICATIONS & ALLERGIES:   Allergies: Review of patient's allergies indicates:  No Known Allergies  Home Meds:   Current Outpatient Medications   Medication Instructions    acetaminophen (TYLENOL) 650 mg, Oral, Every 8 hours PRN    atorvastatin (LIPITOR) 10 mg, Oral, Daily    doxazosin (CARDURA) 4 mg, Oral, Daily    flecainide (TAMBOCOR) 50 mg, Oral, 2 times daily    losartan (COZAAR) 50 mg, Oral,  2 times daily    metoprolol succinate (TOPROL-XL) 100 mg, Daily    XARELTO 20 mg, Oral, Daily      No current facility-administered medications on file prior to encounter.     Current Outpatient Medications on File Prior to Encounter   Medication Sig Dispense Refill    acetaminophen (TYLENOL) 650 MG TbSR Take 1 tablet (650 mg total) by mouth every 8 (eight) hours as needed (pain). 30 tablet 0    atorvastatin (LIPITOR) 10 MG tablet Take 10 mg by mouth once daily.      doxazosin (CARDURA) 4 MG tablet Take 4 mg by mouth once daily.      flecainide (TAMBOCOR) 50 MG Tab Take 50 mg by mouth 2 (two) times a day.      losartan (COZAAR) 50 MG tablet Take 50 mg by mouth 2 (two) times a day.      metoprolol succinate (TOPROL-XL) 100 MG 24 hr tablet 100 mg once daily.      XARELTO 20 mg Tab Take 20 mg by mouth once daily.        No current facility-administered medications on file prior to encounter.     Current Outpatient Medications on File Prior to Encounter   Medication Sig    acetaminophen (TYLENOL) 650 MG TbSR Take 1 tablet (650 mg total) by mouth every 8 (eight) hours as needed (pain).    atorvastatin (LIPITOR) 10 MG tablet Take 10 mg by mouth once daily.    doxazosin (CARDURA) 4 MG tablet Take 4 mg by mouth once daily.    flecainide (TAMBOCOR) 50 MG Tab Take 50 mg by mouth 2 (two) times a day.    losartan (COZAAR) 50 MG tablet Take 50 mg by mouth 2 (two) times a day.    metoprolol succinate (TOPROL-XL) 100 MG 24 hr tablet 100 mg once daily.    XARELTO 20 mg Tab Take 20 mg by mouth once daily.     Scheduled Meds:   acetaminophen  650 mg Oral Q4H    docusate sodium  100 mg Oral BID    gabapentin  300 mg Oral TID    levETIRAcetam  500 mg Oral BID    methocarbamoL  500 mg Oral Q8H    polyethylene glycol  17 g Oral BID     Continuous Infusions:  PRN Meds:hydrALAZINE, magnesium hydroxide 400 mg/5 ml, melatonin, oxyCODONE    OBJECTIVE:   Vital Signs:  VITAL SIGNS: 24 HR MIN & MAX LAST   Temp  Min: 98.3 °F (36.8 °C)  Max: 98.3  "°F (36.8 °C)  98.3 °F (36.8 °C)   BP  Min: 122/78  Max: 138/66  123/75    Pulse  Min: 62  Max: 63  63    Resp  Min: 16  Max: 17  17    SpO2  Min: 94 %  Max: 96 %  (!) 94 %      HT: 5' 5" (165.1 cm)  WT: 83.9 kg (185 lb)  BMI: 30.8   Intake/output: No intake/output data recorded.     Lines/drains/airway:       Peripheral IV - Single Lumen 04/11/24 1109 20 G Right Antecubital (Active)   Number of days: 0       Physical Exam:  General:  Well developed, well nourished, no acute distress  HEENT:  Normocephalic, atraumatic  CV:  RR, 2+ radials bilaterally  Resp/chest: NWOB  GI:  Abdomen soft, non-tender, non-distended  :  Deferred  MSK:  No muscle atrophy, cyanosis, peripheral edema, moving all extremities spontaneously  Neuro: GCS 15. CNII-XII grossly intact, alert and oriented to person, place, and time. Strength and motor function grossly intact to all extremities, sensation intact to all extremities.  Skin/Wounds:  Warm, dry    Labs:  Troponin:  No results for input(s): "TROPONINI" in the last 72 hours.  CBC:  Recent Labs     04/11/24  1058   WBC 8.24   RBC 4.55   HGB 14.1   HCT 43.6      MCV 95.8*   MCH 31.0   MCHC 32.3*     CMP:  Recent Labs     04/11/24  1058   CALCIUM 10.0   ALBUMIN 3.7      K 4.0   CO2 26   BUN 13.2   CREATININE 0.74   ALKPHOS 132   ALT 8   AST 10   BILITOT 0.6     Lactic Acid:  No results for input(s): "LACTATE" in the last 72 hours.  ETOH:  No results for input(s): "ETHANOL" in the last 72 hours.   Urine Drug Screen:  No results for input(s): "COCAINE", "OPIATE", "BARBITURATE", "AMPHETAMINE", "FENTANYL", "CANNABINOIDS", "MDMA" in the last 72 hours.    Invalid input(s): "BENZODIAZEPINE", "PHENCYCLIDINE"   ABG  No results for input(s): "PH", "PO2", "PCO2", "HCO3", "BE" in the last 168 hours.      Diagnostic Results:  CT Cervical Spine Without Contrast   Final Result      1. Nondisplaced fracture of the left lamina of C7.  There are areas of sclerosis associated with this fracture " which suggests chronicity.  Please correlate with exam.         Electronically signed by: Brian Tan MD   Date:    04/11/2024   Time:    12:50      CT Chest Abdomen Pelvis With IV Contrast (XPD) NO Oral Contrast   Final Result      No evidence of acute trauma         Electronically signed by: Frank Lau   Date:    04/11/2024   Time:    12:50      X-Ray Chest AP Portable   Final Result      Minimal increase interstitial markings likely related to poor inspiratory effort.      Mild cardiomegaly.      Otherwise no active pulmonary disease         Electronically signed by: Vivek Wise   Date:    04/11/2024   Time:    12:18          ASSESSMENT & PLAN:      Small ICH  - Neurosurgery following  - Repeat CT head in AM  - Maintain BP < 140/90  - Keppra BID  - SCDs, hold Xarelto     Fall  - Regular diet  - Daily labs   - MM pain control  - Frequent IS  - PT/OT  - SCDs for VTE ppx    Lelia Landon, AGACNP-BC, FNP-BC  Trauma Surgery  [facility]  C: [phone]

## 2024-04-11 NOTE — ED PROVIDER NOTES
Encounter Date: 4/11/2024       History     Chief Complaint   Patient presents with    Fall     Pt fell on 3/28 and did a catscan today and it shows a head bleed. Pt is on xarelto. Pt is alert in triage. Gcs 15 in triage     84 y/o F with hx HTN, HLD, CAD, on Xarelto anticoagulation, presents to the ED on recommendation of her PCP after outpatient CT head obtained in the setting of persistent headache since fall w/ head injury on 3/28 demonstrated findings as below:     New small intracranial hemorrhage measuring just over 1 cm in greatest dimension just above the left tentorium at the caudal left posterior temporal lobe, most suggestive of in intraparenchymal hemorrhage rather than subdural.  Mild surrounding edema suggested with no significant mass effect or midline shift.  No additional acute pathology identified at the head.  Additional chronic findings are similar to a head CT 10/09/2022.     Additionally reports facial pain, back pain since fall.     The history is provided by the patient and medical records.     Review of patient's allergies indicates:  No Known Allergies  Past Medical History:   Diagnosis Date    Coronary artery disease     Fractures     Hypercholesterolemia     Hypertension     Respiratory distress      Past Surgical History:   Procedure Laterality Date    ABLATION      BRAIN SURGERY      aneurysm    COLONOSCOPY      CYSTOSCOPY W/ URETERAL STENT PLACEMENT Left 7/5/2023    Procedure: CYSTOSCOPY, WITH URETERAL STENT INSERTION;  Surgeon: Viet Barkley MD;  Location: [facility];  Service: Urology;  Laterality: Left;  LEFT    HYSTERECTOMY      INTRAMEDULLARY RODDING OF HUMERUS Left 10/26/2022    Procedure: INSERTION, INTRAMEDULLARY LEATHA, HUMERUS;  Surgeon: Doc Scherer DO;  Location: [facility];  Service: Orthopedics;  Laterality: Left;     Family History   Problem Relation Age of Onset    No Known Problems Mother     No Known Problems Father      Social History     Tobacco Use    Smoking  status: Never     Passive exposure: Never    Smokeless tobacco: Never   Substance Use Topics    Alcohol use: Never    Drug use: Never     Review of Systems   Constitutional:  Negative for fever.   HENT:  Positive for facial swelling.    Musculoskeletal:  Positive for back pain.   Neurological:  Positive for dizziness and headaches. Negative for weakness and numbness.       Physical Exam     Initial Vitals [04/11/24 1040]   BP Pulse Resp Temp SpO2   138/66 62 16 98.3 °F (36.8 °C) 96 %      MAP       --         Physical Exam    Nursing note and vitals reviewed.  Constitutional: She appears well-developed and well-nourished. No distress.   HENT:   Head: Normocephalic.   Mouth/Throat: Oropharynx is clear and moist.   Eyes: Conjunctivae are normal. Pupils are equal, round, and reactive to light.   Left eyebrow contusion, subacute   Neck: Neck supple.   Normal range of motion.  Cardiovascular:  Normal rate, regular rhythm and intact distal pulses.           Pulmonary/Chest: Breath sounds normal. No respiratory distress. She exhibits no tenderness.   Abdominal: Abdomen is soft. She exhibits no distension. There is no abdominal tenderness.   Musculoskeletal:      Cervical back: Normal range of motion and neck supple.      Comments: Mid thoracic and lumbar tenderness to palpation, no stepoff deformity, no long bone deformity     Neurological: She is alert and oriented to person, place, and time. She has normal strength. GCS score is 15. GCS eye subscore is 4. GCS verbal subscore is 5. GCS motor subscore is 6.   Skin: Skin is warm and dry. No rash noted.   Psychiatric: She has a normal mood and affect.         ED Course   Procedures  Labs Reviewed   COMPREHENSIVE METABOLIC PANEL - Abnormal; Notable for the following components:       Result Value    Globulin 3.6 (*)     Albumin/Globulin Ratio 1.0 (*)     All other components within normal limits   PROTIME-INR - Abnormal; Notable for the following components:    PT 25.0 (*)      INR 2.3 (*)     All other components within normal limits   APTT - Abnormal; Notable for the following components:    PTT 52.7 (*)     All other components within normal limits   CBC WITH DIFFERENTIAL - Abnormal; Notable for the following components:    MCV 95.8 (*)     MCHC 32.3 (*)     All other components within normal limits   CBC W/ AUTO DIFFERENTIAL    Narrative:     The following orders were created for panel order CBC auto differential.  Procedure                               Abnormality         Status                     ---------                               -----------         ------                     CBC with Differential[[id number]]       Abnormal            Final result                 Please view results for these tests on the individual orders.   TYPE & SCREEN          Imaging Results              CT Cervical Spine Without Contrast (Final result)  Result time 04/11/24 12:50:39      Final result by Brian Tan MD (04/11/24 12:50:39)                   Impression:      1. Nondisplaced fracture of the left lamina of C7.  There are areas of sclerosis associated with this fracture which suggests chronicity.  Please correlate with exam.      Electronically signed by: Brian Tan MD  Date:    04/11/2024  Time:    12:50               Narrative:    EXAMINATION:  CT CERVICAL SPINE WITHOUT CONTRAST    CLINICAL HISTORY:  Neck trauma (Age >= 65y);    TECHNIQUE:  Axial, helical imaging of the cervical spine was performed.  Axial, sagittal and coronal images were generated.  Soft tissue and bone algorithms were applied.  Automated exposure control was utilized to limit radiation exposure to the patient.   Total DLP for this study was  310 mgycm.    COMPARISON:  CT of the cervical spine 10/09/2022    FINDINGS:  There is a nondisplaced fracture of the left lamina of C7.  There is minimal grade 1 retrolisthesis of C2 on C3.  There is grade 1 anterolisthesis of C4 on C5.  There is grade 1  anterolisthesis of C7 on T1. Vertebral body heights, alignment and density are otherwise within normal limits. Multilevel disc and joint disease are noted.  There is no displaced fracture.  There is no acute paravertebral soft tissue abnormality.                                       CT Chest Abdomen Pelvis With IV Contrast (XPD) NO Oral Contrast (Final result)  Result time 04/11/24 12:50:11      Final result by Asia Lau MD (04/11/24 12:50:11)                   Impression:      No evidence of acute trauma      Electronically signed by: Frank Lau  Date:    04/11/2024  Time:    12:50               Narrative:    EXAMINATION:  CT CHEST ABDOMEN PELVIS WITH IV CONTRAST (XPD)    CLINICAL HISTORY:  Polytrauma, blunt;    TECHNIQUE:  Low dose axial images, sagittal and coronal reformations were obtained from the thoracic inlet to the pubic symphysis following the IV contrast administration. Automatic exposure control is utilized to reduce patient radiation exposure.    COMPARISON:  07/05/2023    FINDINGS:  The lungs are adequately aerated.  No pneumothorax is seen.  No pulmonary contusion is seen.  No pleural effusion is seen.  No infiltrate is seen.    The thoracic aorta is normal in caliber.  No dissection or aneurysm is seen.  No retrosternal hematoma is seen.    The abdominal aorta appears grossly unremarkable.  No dissection or posttraumatic changes are seen.    The heart appears normal.    The liver appears normal.  No liver mass or lesion is seen.  No evidence of liver laceration is seen.    The gallbladder appears normal.  No gallstones are seen..    The spleen appears normal.  No splenic laceration is seen.  The pancreas appears grossly unremarkable.  No pancreatic mass or lesion is seen.  No inflammation is seen.    No adrenal abnormality is seen.  No adrenal nodule is seen.    The kidneys are well perfused.  There is pelvicaliceal dilatation on the right side which was seen on the prior  examination as well.  Similar changes are seen in the left kidney.  Some parapelvic cysts are also seen in the left kidney.  Some cortical cysts are seen bilaterally.  Nonspecific perinephric stranding seen around both kidneys which are seen on the prior examination as well..    Visualized portions of the bowel shows no acute abnormality.  No colitis is seen.  No diverticulitis is seen.  No colonic mass is seen.    No free air is seen.  No free fluid is seen.    Urinary bladder appears unremarkable.    No sternal fracture is seen.  No thoracic spine fracture is seen.  There is evidence of dextroscoliosis of the lumbar spine.  No lumbar spine fracture is seen.  No pelvic fracture is seen.  No rib fractures are seen.    There is a intramedullary shari in the left humerus for stabilization with old fracture.  No obvious periprosthetic fracture seen.                                       X-Ray Chest AP Portable (Final result)  Result time 04/11/24 12:18:27      Final result by Vivek Wise MD (04/11/24 12:18:27)                   Impression:      Minimal increase interstitial markings likely related to poor inspiratory effort.    Mild cardiomegaly.    Otherwise no active pulmonary disease      Electronically signed by: Vivek Wise  Date:    04/11/2024  Time:    12:18               Narrative:    EXAMINATION:  XR CHEST AP PORTABLE    CLINICAL HISTORY:  Dorsalgia, unspecified    TECHNIQUE:  Single frontal view of the chest was performed.    COMPARISON:  October 17, 2022    FINDINGS:  Examination reveals mediastinal silhouette to be within normal limits cardiac silhouette is mildly enlarged there is a poor inspiratory effort that accentuates the pulmonary vascular markings no focal consolidative changes are clearly identified                                       Radiology (Final result)  Result time 04/17/24 08:29:29      Final result by Unknown User (04/17/24 08:29:29)                                          Medications   levETIRAcetam tablet 500 mg (has no administration in time range)     Medical Decision Making  Problems Addressed:  Acute bilateral low back pain without sciatica: acute illness or injury  Anticoagulated by anticoagulation treatment: chronic illness or injury with exacerbation, progression, or side effects of treatment  Contusion of left eyebrow, initial encounter: acute illness or injury  Traumatic intracranial hemorrhage without loss of consciousness, initial encounter: acute illness or injury  Upper back pain on left side: acute illness or injury    Amount and/or Complexity of Data Reviewed  Labs: ordered.  Radiology: ordered.    Risk  Decision regarding hospitalization.      ED assessment:    Ms. Moss presented w/ headache x 2 weeks since fall w/ + head impact, on anticoagulation. Outpatient CT w/ 1 cm intraparenchymal hemorrhage + edema. Also w/ back pain, difficulty walking.       Differential diagnosis (including but not limited to):   Closed head injury, intracranial hemorrhage, cerebral contusion, spinal fracture, spinal strain, rib fracture, disc herniation    ED management:   See ED course below    My independent radiology interpretation:   CXR: no displaced rib fracture, no pneumothorax, no focal infiltrate or effusion    Amount and/or Complexity of Data Reviewed  Independent historian: none   Summary of history:   External data reviewed: prior imaging  Summary of data reviewed:   CT head earlier today:  New small intracranial hemorrhage measuring just over 1 cm in greatest dimension just above the left tentorium at the caudal left posterior temporal lobe, most suggestive of in intraparenchymal hemorrhage rather than subdural.  Mild surrounding edema suggested with no significant mass effect or midline shift.  No additional acute pathology identified at the head.  Additional chronic findings are similar to a head CT 10/09/2022.     Risk and benefits of testing: discussed   Labs:  ordered and reviewed  Radiology: ordered and independent interpretation performed (see above or ED course)    Discussion of management or test interpretation with external provider(s): discussed with trauma surgery, neurosurgery consultant   Summary of discussion: as below    Risk  Decision regarding hospitalization  Shared decision making     Critical Care  none    I, Miriam Alcala MD personally performed the history, PE, MDM, and procedures as documented above and agree with the scribe's documentation.              ED Course as of 04/11/24 1205   Thu Apr 11, 2024   1035 Noted outpatient CT obtained today demonstrates intracranial hemorrhage.  Per clinic note documentation, had a slip and fall on 03/28, reported having struck her head at that time.  On Xarelto anticoagulation. [KS]   1116 Discussed with trauma NP regarding injuries, will follow up neurosurgery recs and admit if neurosurgery wants patient to stay [KS]   1143 Patient evaluated at bedside by Neurosurgery, advised okay for admit to floor, hold oral anticoagulant medications, maintain normotensive, BP less than 140, repeat head CT in the morning.  Given her continued back pain now with more difficulty walking, thoracoabdominal and spinal imaging has been ordered as well. [KS]   1148 Discussed with Trauma surgery NP, will write admit orders [KS]      ED Course User Index  [KS] Miriam Alcala MD                           Clinical Impression:  Final diagnoses:  [M54.9] Upper back pain on left side  [S06.300A] Traumatic intracranial hemorrhage without loss of consciousness, initial encounter (Primary)  [Z79.01] Anticoagulated by anticoagulation treatment  [S00.12XA] Contusion of left eyebrow, initial encounter  [M54.50] Acute bilateral low back pain without sciatica          ED Disposition Condition    Observation Stable                Miriam Alcala MD  04/30/24 1222

## 2024-04-11 NOTE — FIRST PROVIDER EVALUATION
"Medical screening examination initiated.  I have conducted a focused provider triage encounter, findings are as follows:    Brief history of present illness:  83-year-old female presents to ED for evaluation after slip and fall on 03/28/2024.  Patient reports falling and hitting her head.  Since then she has been having increased headaches.  Reports bilateral back pain radiating into her hips and down her legs.  Patient was seen by her PCP and had outpatient CT showing intracranial hemorrhage.  Sent to ED for further evaluation.    Vitals:    04/11/24 1040   BP: 138/66   Pulse: 62   Resp: 16   Temp: 98.3 °F (36.8 °C)   SpO2: 96%   Weight: 83.9 kg (185 lb)   Height: 5' 5" (1.651 m)       Pertinent physical exam:  Patient is awake and alert sitting in wheelchair.    Brief workup plan:  Labs    Preliminary workup initiated; this workup will be continued and followed by the physician or advanced practice provider that is assigned to the patient when roomed.  "

## 2024-04-11 NOTE — CONSULTS
[facility] - Emergency Dept  Neurosurgery  Consult Note    Inpatient consult to Neurosurgery  Consult performed by: Nikolas Nieves AGACNP-BC  Consult ordered by: Miriam Alcala MD        Subjective:     Chief Complaint/Reason for Admission:  Patient had a fall on 03/28/2024-striking the left side of her head falling on the left side of her body.    History of Present Illness:  This is a very pleasant 83-year-old female who fell and struck her head on 03/28/2024 and has been experiencing headaches since then.  Patient is on Xarelto.  She presented to her PCPs office initially for persistent and intermittent headache since the fall.  Also low back pain radiating into bilateral lower extremities with muscle spasms and bilateral knee pain.  Patient with past medical history significant for both aneurysm coiling and clipping approximately 18 years ago.  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  She does experience falls from time to time.  Patient denies any LOC, nausea vomiting or dizziness.    CT head without contrast performed today:New small intracranial hemorrhage measuring just over 1 cm in greatest dimension just above the left tentorium at the caudal left posterior temporal lobe, most suggestive of in intraparenchymal hemorrhage rather than subdural. Mild surrounding edema suggested with no significant mass effect or midline shift. No additional acute pathology identified at the head. Additional chronic findings are similar to a head CT 10/09/2022.     On physical exam the patient is sitting up in bed with her  at bedside.  She is very pleasant.  She complains of slight headache on the left.  She states this is ongoing since the fall on 03/28/2024.  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  She is moving all extremities.  She  is following commands in his fully oriented.  She does complain of lower back pain as well as bilateral shooting pains down both glutes.      PT INR 25 and 2.3, PTT 52.7.  Platelets 180.    (Not in a hospital admission)      Review of patient's allergies indicates:  No Known Allergies    Past Medical History:   Diagnosis Date    Coronary artery disease     Fractures     Hypercholesterolemia     Hypertension     Respiratory distress      Past Surgical History:   Procedure Laterality Date    ABLATION      BRAIN SURGERY      aneurysm    COLONOSCOPY      CYSTOSCOPY W/ URETERAL STENT PLACEMENT Left 7/5/2023    Procedure: CYSTOSCOPY, WITH URETERAL STENT INSERTION;  Surgeon: Viet Barkley MD;  Location: [facility];  Service: Urology;  Laterality: Left;  LEFT    HYSTERECTOMY      INTRAMEDULLARY RODDING OF HUMERUS Left 10/26/2022    Procedure: INSERTION, INTRAMEDULLARY LEATHA, HUMERUS;  Surgeon: Doc Scherer DO;  Location: [facility];  Service: Orthopedics;  Laterality: Left;     Family History       Problem Relation (Age of Onset)    No Known Problems Mother, Father          Tobacco Use    Smoking status: Never     Passive exposure: Never    Smokeless tobacco: Never   Substance and Sexual Activity    Alcohol use: Never    Drug use: Never    Sexual activity: Yes     Review of Systems   Constitutional:  Positive for activity change.   HENT:          Chronic loss of olfactory   Musculoskeletal:  Positive for gait problem.        Chronic left-sided weakness affecting patient's gait   Neurological:  Positive for headaches.        Chronic left upper and lower extremity weakness     Objective:     Weight: 83.9 kg (185 lb)  Body mass index is 30.79 kg/m².  Vital Signs (Most Recent):  Temp: 98.3 °F (36.8 °C) (04/11/24 1040)  Pulse: 63 (04/11/24 1132)  Resp: 17 (04/11/24 1132)  BP: 123/75 (04/11/24 1132)  SpO2: (!) 94 % (04/11/24 1132) Vital Signs (24h Range):  Temp:  [98.3 °F (36.8 °C)] 98.3 °F (36.8 °C)  Pulse:  [62-63] 63  Resp:   [16-17] 17  SpO2:  [94 %-96 %] 94 %  BP: (122-138)/(66-78) 123/75                              Physical Exam:  Nursing note and vitals reviewed.    Constitutional: She appears well-developed and well-nourished. She is not diaphoretic. No distress.     Eyes: Pupils are equal, round, and reactive to light. EOM are normal.     Cardiovascular: Normal rate.     Abdominal: Soft. Bowel sounds are normal.     Skin: Skin displays no rash on trunk. Skin displays no lesions on trunk.     Psych/Behavior: She is alert. She is oriented to person, place, and time. She has a normal mood and affect.     Musculoskeletal:        Right Upper Extremities: Muscle strength is 5/5.        Left Upper Extremities: Muscle strength is 4/5.       Right Lower Extremities: Muscle strength is 5/5.        Left Lower Extremities: Muscle strength is 4/5.      Comments: Right upper and lower extremity 5/5   Left upper and lower extremity 4+ out of 5.      Sharp shooting pains to bilateral glutes   Endorses lower back pain   No Noguera no clonus normal Babinski   No bowel or bladder issues.     Neurological:        Sensory: There is no sensory deficit in the trunk. There is no sensory deficit in the extremities.        DTRs: She displays no Babinski's sign on the right side. She displays no Babinski's sign on the left side.        Cranial nerves: Cranial nerve(s) II, III, IV, V, VI, VII, VIII, IX, X, XI and XII are intact.   GCS 15   PERRLA   Following commands   Fully oriented   Moving all extremities with chronic left upper and lower extremity weakness   Chronic olfactory deficit   Speech clear, no facial droop   Slight drift on the left but chronic   Slight left temporal headache  Vision grossly intact       Significant Labs:  Recent Labs   Lab 04/11/24  1058      K 4.0   CO2 26   BUN 13.2   CREATININE 0.74   CALCIUM 10.0     Recent Labs   Lab 04/11/24  1058   WBC 8.24   HGB 14.1   HCT 43.6        Recent Labs   Lab 04/11/24  1058   INR  2.3*     Microbiology Results (last 7 days)       ** No results found for the last 168 hours. **              Assessment/Plan:    Status post slip and fall on 03/28/2024 striking the left side of her head.    Intermittent headaches since fall.    Patient on Xarelto   History of aneurysm clipping and coiling 18 years ago.  History of low back pain, bilateral knee pain.    Neurological exams every 4 hours  Repeat CT head without contrast in the morning   Blood pressure less than 140/90   Seizure precautions, Keppra  Fall precautions  PTOT  SCDs  Hold oral anticoagulation   ED obtaining further imaging including CT cervical spine and CT chest abdomen pelvis.           There are no hospital problems to display for this patient.      Thank you for your consult. I will follow-up with patient. Please contact us if you have any additional questions.    SARI Yeung-BC  Neurosurgery  [facility] - Emergency Dept

## 2024-04-12 VITALS
DIASTOLIC BLOOD PRESSURE: 67 MMHG | RESPIRATION RATE: 18 BRPM | TEMPERATURE: 98 F | HEIGHT: 65 IN | OXYGEN SATURATION: 95 % | WEIGHT: 185 LBS | SYSTOLIC BLOOD PRESSURE: 109 MMHG | BODY MASS INDEX: 30.82 KG/M2 | HEART RATE: 69 BPM

## 2024-04-12 LAB
ALBUMIN SERPL-MCNC: 3.1 G/DL (ref 3.4–4.8)
ALBUMIN/GLOB SERPL: 1.2 RATIO (ref 1.1–2)
ALP SERPL-CCNC: 108 UNIT/L (ref 40–150)
ALT SERPL-CCNC: 7 UNIT/L (ref 0–55)
AST SERPL-CCNC: 9 UNIT/L (ref 5–34)
BASOPHILS # BLD AUTO: 0.04 X10(3)/MCL
BASOPHILS NFR BLD AUTO: 0.4 %
BILIRUB SERPL-MCNC: 0.5 MG/DL
BUN SERPL-MCNC: 20.8 MG/DL (ref 9.8–20.1)
CALCIUM SERPL-MCNC: 9.1 MG/DL (ref 8.4–10.2)
CHLORIDE SERPL-SCNC: 106 MMOL/L (ref 98–107)
CO2 SERPL-SCNC: 26 MMOL/L (ref 23–31)
CREAT SERPL-MCNC: 0.94 MG/DL (ref 0.55–1.02)
EOSINOPHIL # BLD AUTO: 0.19 X10(3)/MCL (ref 0–0.9)
EOSINOPHIL NFR BLD AUTO: 2 %
ERYTHROCYTE [DISTWIDTH] IN BLOOD BY AUTOMATED COUNT: 13.2 % (ref 11.5–17)
GFR SERPLBLD CREATININE-BSD FMLA CKD-EPI: >60 MLS/MIN/1.73/M2
GLOBULIN SER-MCNC: 2.6 GM/DL (ref 2.4–3.5)
GLUCOSE SERPL-MCNC: 94 MG/DL (ref 82–115)
HCT VFR BLD AUTO: 39 % (ref 37–47)
HGB BLD-MCNC: 12.9 G/DL (ref 12–16)
IMM GRANULOCYTES # BLD AUTO: 0.02 X10(3)/MCL (ref 0–0.04)
IMM GRANULOCYTES NFR BLD AUTO: 0.2 %
LYMPHOCYTES # BLD AUTO: 2.02 X10(3)/MCL (ref 0.6–4.6)
LYMPHOCYTES NFR BLD AUTO: 21.1 %
MAGNESIUM SERPL-MCNC: 2.1 MG/DL (ref 1.6–2.6)
MCH RBC QN AUTO: 31.4 PG (ref 27–31)
MCHC RBC AUTO-ENTMCNC: 33.1 G/DL (ref 33–36)
MCV RBC AUTO: 94.9 FL (ref 80–94)
MONOCYTES # BLD AUTO: 1.14 X10(3)/MCL (ref 0.1–1.3)
MONOCYTES NFR BLD AUTO: 11.9 %
NEUTROPHILS # BLD AUTO: 6.17 X10(3)/MCL (ref 2.1–9.2)
NEUTROPHILS NFR BLD AUTO: 64.4 %
NRBC BLD AUTO-RTO: 0 %
PHOSPHATE SERPL-MCNC: 3.9 MG/DL (ref 2.3–4.7)
PLATELET # BLD AUTO: 179 X10(3)/MCL (ref 130–400)
PMV BLD AUTO: 9.1 FL (ref 7.4–10.4)
POTASSIUM SERPL-SCNC: 4.5 MMOL/L (ref 3.5–5.1)
PROT SERPL-MCNC: 5.7 GM/DL (ref 5.8–7.6)
RBC # BLD AUTO: 4.11 X10(6)/MCL (ref 4.2–5.4)
SODIUM SERPL-SCNC: 139 MMOL/L (ref 136–145)
WBC # SPEC AUTO: 9.58 X10(3)/MCL (ref 4.5–11.5)

## 2024-04-12 PROCEDURE — 97166 OT EVAL MOD COMPLEX 45 MIN: CPT

## 2024-04-12 PROCEDURE — 94799 UNLISTED PULMONARY SVC/PX: CPT

## 2024-04-12 PROCEDURE — 97162 PT EVAL MOD COMPLEX 30 MIN: CPT

## 2024-04-12 PROCEDURE — 99900035 HC TECH TIME PER 15 MIN (STAT)

## 2024-04-12 PROCEDURE — 80053 COMPREHEN METABOLIC PANEL: CPT

## 2024-04-12 PROCEDURE — 84100 ASSAY OF PHOSPHORUS: CPT

## 2024-04-12 PROCEDURE — 25000003 PHARM REV CODE 250: Performed by: NURSE PRACTITIONER

## 2024-04-12 PROCEDURE — 85025 COMPLETE CBC W/AUTO DIFF WBC: CPT

## 2024-04-12 PROCEDURE — 25000003 PHARM REV CODE 250

## 2024-04-12 PROCEDURE — 83735 ASSAY OF MAGNESIUM: CPT

## 2024-04-12 PROCEDURE — 99900031 HC PATIENT EDUCATION (STAT)

## 2024-04-12 RX ORDER — METHOCARBAMOL 500 MG/1
500 TABLET, FILM COATED ORAL EVERY 8 HOURS
Qty: 30 TABLET | Refills: 0 | Status: SHIPPED | OUTPATIENT
Start: 2024-04-12 | End: 2024-04-22

## 2024-04-12 RX ORDER — LEVETIRACETAM 500 MG/1
500 TABLET ORAL 2 TIMES DAILY
Qty: 12 TABLET | Refills: 0 | Status: SHIPPED | OUTPATIENT
Start: 2024-04-12 | End: 2024-05-30

## 2024-04-12 RX ADMIN — GABAPENTIN 300 MG: 300 CAPSULE ORAL at 02:04

## 2024-04-12 RX ADMIN — METHOCARBAMOL 500 MG: 500 TABLET ORAL at 02:04

## 2024-04-12 RX ADMIN — ACETAMINOPHEN 650 MG: 325 TABLET, FILM COATED ORAL at 09:04

## 2024-04-12 RX ADMIN — ACETAMINOPHEN 650 MG: 325 TABLET, FILM COATED ORAL at 02:04

## 2024-04-12 RX ADMIN — GABAPENTIN 300 MG: 300 CAPSULE ORAL at 09:04

## 2024-04-12 RX ADMIN — ACETAMINOPHEN 650 MG: 325 TABLET, FILM COATED ORAL at 04:04

## 2024-04-12 RX ADMIN — DOCUSATE SODIUM 100 MG: 100 CAPSULE, LIQUID FILLED ORAL at 09:04

## 2024-04-12 RX ADMIN — POLYETHYLENE GLYCOL 3350 17 G: 17 POWDER, FOR SOLUTION ORAL at 09:04

## 2024-04-12 RX ADMIN — LEVETIRACETAM 500 MG: 500 TABLET, FILM COATED ORAL at 09:04

## 2024-04-12 RX ADMIN — METHOCARBAMOL 500 MG: 500 TABLET ORAL at 04:04

## 2024-04-12 NOTE — DISCHARGE INSTRUCTIONS
Hold anticoagulation for a full 7 days. Continue Keppra for seizure prophylaxis for a full 7 days. Follow up with Neurosurgery only as needed.

## 2024-04-12 NOTE — PT/OT/SLP EVAL
Physical Therapy Evaluation    Patient Name:  Sumaya Moss   MRN:  [id number]    Recommendations:     Discharge therapy intensity: Low Intensity Therapy   Discharge Equipment Recommendations: none   Barriers to discharge: Impaired mobility    Assessment:     Sumaya Moss is a 83 y.o. female admitted with a medical diagnosis of fall, intraparenchymal hemorrhage L temporal lobe, chronic C7 fx.  She presents with the following impairments/functional limitations: gait instability, weakness, impaired balance, impaired endurance, decreased safety awareness, impaired functional mobility. The pt tolerated session well. She lives at home with her spouse, who is able to assist her at home upon dc. She is able to ambulate x 15 feet in room. Progress as tolerated.    Rehab Prognosis: Good; patient would benefit from acute skilled PT services to address these deficits and reach maximum level of function.    Recent Surgery: * No surgery found *      Plan:     During this hospitalization, patient to be seen 6 x/week to address the identified rehab impairments via gait training, therapeutic activities, therapeutic exercises and progress toward the following goals:    Plan of Care Expires:  05/10/24    Subjective     Chief Complaint: none  Patient/Family Comments/goals: return to PLOF  Pain/Comfort:  Location - Side 1: Bilateral  Location 1: knee  Pain Addressed 1: Reposition, Distraction    Patients cultural, spiritual, [employer] conflicts given the current situation:      Living Environment:  Home with , SL home, no steps to e nter  Prior to admission, patients level of function was independent.  Equipment used at home: rollator.  DME owned (not currently used):  rollator .  Upon discharge, patient will have assistance from spouse.    Objective:     Communicated with NSG prior to session.  Patient found supine with    upon PT entry to room.    General Precautions: Standard, fall, BP <140/90  Orthopedic  Precautions:N/A   Braces: N/A  Respiratory Status: Room air  Blood Pressure: 111/61      Exams:  RLE ROM: WFL  RLE Strength: WFL  LLE ROM: WFL  LLE Strength: WFL  Skin integrity: Visible skin intact      Functional Mobility:  Bed Mobility:     Scooting: stand by assistance  Supine to Sit: stand by assistance  Sit to Supine: stand by assistance  Transfers:     Sit to Stand:  stand by assistance with rollator  Gait: pt ambulates x 15 feet in room with rollator and CGA/SBA       Patient provided with verbal education education regarding PT role/goals/POC, safety awareness, and discharge/DME recommendations.  Understanding was verbalized.     Patient left supine with all lines intact, call button in reach, and NSG notified.    GOALS:   Multidisciplinary Problems       Physical Therapy Goals          Problem: Physical Therapy    Goal Priority Disciplines Outcome Goal Variances Interventions   Physical Therapy Goal     PT, PT/OT Ongoing, Progressing     Description: Goals to be met by: 24     Patient will increase functional independence with mobility by performin. Supine to sit with Modified [place]  2. Sit to supine with Modified [place]  3. Sit to stand transfer with Modified [place]  4. Bed to chair transfer with Modified [place] using Rolling Walker  5. Gait  x 200 feet with Modified [place] using Rolling Walker.                          History:     Past Medical History:   Diagnosis Date    Coronary artery disease     Fractures     Hypercholesterolemia     Hypertension     Respiratory distress        Past Surgical History:   Procedure Laterality Date    ABLATION      BRAIN SURGERY      aneurysm    COLONOSCOPY      CYSTOSCOPY W/ URETERAL STENT PLACEMENT Left 2023    Procedure: CYSTOSCOPY, WITH URETERAL STENT INSERTION;  Surgeon: Viet Barkley MD;  Location: [facility];  Service: Urology;  Laterality: Left;  LEFT    HYSTERECTOMY      INTRAMEDULLARY RODDING OF HUMERUS Left  10/26/2022    Procedure: INSERTION, INTRAMEDULLARY LEATHA, HUMERUS;  Surgeon: Doc Scherer DO;  Location: [facility];  Service: Orthopedics;  Laterality: Left;       Time Tracking:     PT Received On: 04/12/24  PT Start Time: 0920     PT Stop Time: 0931  PT Total Time (min): 11 min     Billable Minutes: Evaluation 11      04/12/2024

## 2024-04-12 NOTE — PT/OT/SLP EVAL
Occupational Therapy  Evaluation    Name: Sumaya Moss  MRN: [id number]  Admitting Diagnosis: Fall, back pain c sciatica, small ICH, chronic C7 fx   Recent Surgery: * No surgery found *      Recommendations:     Discharge therapy intensity: Low Intensity Therapy   Discharge Equipment Recommendations:  none  Barriers to discharge:  None    Assessment:     Sumaya Moss is a 83 y.o. female with a medical diagnosis of Fall, back pain c sciatica, small ICH, chronic C7 fx .  Pt reported prior to admit she was IND c ADLs and ambulating c a rollator 2/2 back pain. Reported whenever she fell she was not using her rollator. She presents with the following performance deficits affecting function: weakness, impaired endurance, impaired self care skills, impaired functional mobility, gait instability, pain, impaired balance. Pt able to complete LB dressing and toilet t/f c SBA during evaluation. Reported her spouse will be able to assist her as needed at d/c. Recommend low intensity therapy at d/c.     Rehab Prognosis: Good; patient would benefit from acute skilled OT services to address these deficits and reach maximum level of function.       Plan:     Patient to be seen 3 x/week to address the above listed problems via self-care/home management, therapeutic activities, therapeutic exercises  Plan of Care Expires: 04/26/24  Plan of Care Reviewed with: patient    Subjective     Chief Complaint: Back pain that radiates into buttocks and down legs  Patient/Family Comments/goals: To go home     Occupational Profile:  Living Environment: Pt loves in a [facility] c her spouse. Reported she has a tub/shower c a walk in chair.   Previous level of function: IND c ADLs. Reported she has been ambulating c a rollator for the past 4 days 2/2 back pain.   Roles and Routines: Cooks, cleans, spouse   Equipment Used at Home: rollator, shower chair  Assistance upon Discharge: Pt's spouse will be able to assist at d/c.      Pain/Comfort:  Pain Rating 1: 3/10  Location 1: head  Pain Addressed 1: Reposition, Distraction    Patients cultural, spiritual, Restoration conflicts given the current situation: no    Objective:     OT communicated with RN prior to session.      Patient was found supine upon OT entry to room.    General Precautions: Standard, fall (<140/90, soft c-collar as needed)  Orthopedic Precautions: N/A  Braces: N/A    Vital Signs: Blood Pressure: 111/61    Bed Mobility:    Patient completed Supine to Sit with stand by assistance  Patient completed Sit to Supine with stand by assistance    Functional Mobility/Transfers:  Patient completed Sit <> Stand Transfer with stand by assistance  with  rollator   Patient completed Toilet Transfer Step Transfer technique with stand by assistance with  grab bars and rollator  Functional Mobility: Pt ambulated to bathroom using rollator c SBA for safety    Activities of Daily Living:  Lower Body Dressing: stand by assistance to don slip on shoes     Functional Cognition:  Intact    Visual Perceptual Skills:  Intact    Upper Extremity Function:  Right Upper Extremity:   WFL    Left Upper Extremity:  WFL      Therapeutic Positioning  Risk for acquired pressure injuries is decreased due to ability to get to BSC/toilet with assist.    OT interventions performed during the course of today's session:   Education was provided on benefits of and recommendations for therapeutic positioning    Skin assessment: full body skin assessment was performed    Findings: no redness or breakdown noted    OT recommendations for therapeutic positioning throughout hospitalization:   Follow [facility] Pressure Injury Prevention Protocol    Patient Education:  Patient and spouse were provided with verbal education education regarding OT role/goals/POC and fall prevention.  Understanding was verbalized.     Patient left HOB elevated with call button in reach.    GOALS:   Multidisciplinary Problems        Occupational Therapy Goals          Problem: Occupational Therapy    Goal Priority Disciplines Outcome Interventions   Occupational Therapy Goal     OT, PT/OT Ongoing, Progressing    Description: LTG: Pt will perform basic ADLs and ADL transfers with Modified independence and good compliance to sternal precautions using LRAD by discharge.    STG: to be met by 4/10/24:    Pt will complete grooming standing at sink with LRAD with SBA.  Pt will complete UB dressing with SBA.  Pt will complete LB dressing with SBA using LRAD and AE prn.  Pt will complete toileting with SBA using LRAD.  Pt will complete functional mobility to/from toilet and toilet transfer with SBA using LRAD.   Pt will independently verbalize sternal precautions with >90% accuracy.                        History:     Past Medical History:   Diagnosis Date    Coronary artery disease     Fractures     Hypercholesterolemia     Hypertension     Respiratory distress          Past Surgical History:   Procedure Laterality Date    ABLATION      BRAIN SURGERY      aneurysm    COLONOSCOPY      CYSTOSCOPY W/ URETERAL STENT PLACEMENT Left 7/5/2023    Procedure: CYSTOSCOPY, WITH URETERAL STENT INSERTION;  Surgeon: Viet Barkley MD;  Location: [facility];  Service: Urology;  Laterality: Left;  LEFT    HYSTERECTOMY      INTRAMEDULLARY RODDING OF HUMERUS Left 10/26/2022    Procedure: INSERTION, INTRAMEDULLARY LEATHA, HUMERUS;  Surgeon: Doc Scherer DO;  Location: [facility];  Service: Orthopedics;  Laterality: Left;       Time Tracking:     OT Date of Treatment: 04/12/24  OT Start Time: 0919  OT Stop Time: 0931  OT Total Time (min): 12 min    Billable Minutes:Evaluation Moderate complexity     4/12/2024

## 2024-04-12 NOTE — PROGRESS NOTES
[facility] - [facility]  Neurosurgery  Progress Note    Subjective:     Interval History:     Neurologically intact   No new issues overnight   Vital signs stable   CT this morning stable      CT head without contrast: FINDINGS:   There is a stable rounded hyperdensity along the left tentorium.  There is no new or progressive acute intracranial hemorrhage.  There are postoperative changes from bilateral craniotomies with aneurysm clips in place.  Patchy hypodensities in the subcortical and periventricular white matter likely represent chronic microvascular ischemic changes.  There is no mass effect or midline shift.  The basal cisterns are patent.  There is no hydrocephalus.  The skull base is intact.       Post-Op Info:  * No surgery found *          Medications:  Continuous Infusions:  Scheduled Meds:   acetaminophen  650 mg Oral Q4H    docusate sodium  100 mg Oral BID    gabapentin  300 mg Oral TID    levETIRAcetam  500 mg Oral BID    methocarbamoL  500 mg Oral Q8H    polyethylene glycol  17 g Oral BID     PRN Meds:hydrALAZINE, magnesium hydroxide 400 mg/5 ml, melatonin, oxyCODONE     Review of Systems  Objective:     Weight: 83.9 kg (185 lb)  Body mass index is 30.79 kg/m².  Vital Signs (Most Recent):  Temp: 97.5 °F (36.4 °C) (04/12/24 1049)  Pulse: 62 (04/12/24 1049)  Resp: 18 (04/12/24 0719)  BP: 107/66 (04/12/24 1049)  SpO2: (!) 94 % (04/12/24 1049) Vital Signs (24h Range):  Temp:  [97.5 °F (36.4 °C)-98.3 °F (36.8 °C)] 97.5 °F (36.4 °C)  Pulse:  [59-75] 62  Resp:  [16-18] 18  SpO2:  [93 %-97 %] 94 %  BP: (107-177)/(48-80) 107/66     Date 04/12/24 0700 - 04/13/24 0659   Shift 5903-9449 4232-5821 2419-4222 24 Hour Total   INTAKE   P.O. 480   480   Shift Total(mL/kg) 480(5.7)   480(5.7)   OUTPUT   Shift Total(mL/kg)       Weight (kg) 83.9 83.9 83.9 83.9                            Neurosurgery Physical Exam    GCS 15   PERRLA   Following commands   Fully oriented   Moving all extremities with  chronic left upper and lower extremity weakness   Chronic olfactory deficit   Speech clear, no facial droop   Slight drift on the left but chronic   Slight left temporal headache  Vision grossly intact     Significant Labs:  Recent Labs   Lab 04/11/24  1058 04/12/24  0403    139   K 4.0 4.5   CO2 26 26   BUN 13.2 20.8*   CREATININE 0.74 0.94   CALCIUM 10.0 9.1   MG  --  2.10     Recent Labs   Lab 04/11/24  1058 04/12/24  0403   WBC 8.24 9.58   HGB 14.1 12.9   HCT 43.6 39.0    179     Recent Labs   Lab 04/11/24  1058   INR 2.3*     Microbiology Results (last 7 days)       ** No results found for the last 168 hours. **            Assessment/Plan:  Status post slip and fall on 03/28/2024 striking the left side of her head.    Intermittent headaches since fall.    Patient on Xarelto   History of aneurysm clipping and coiling 18 years ago.  History of low back pain, bilateral knee pain.      CT today stable   GCS remains 15   Recommend physical therapy outpatient   Hold Xarelto for at least a week   Okay to be discharged dispo per attending       Active Diagnoses:    Diagnosis Date Noted POA    PRINCIPAL PROBLEM:  Intracranial hemorrhage [I62.9] 04/11/2024 Yes    Fall [W19.XXXA] 04/11/2024 Yes      Problems Resolved During this Admission:       SARI Yeung-BC  Neurosurgery  [facility] - [facility]

## 2024-04-12 NOTE — PLAN OF CARE
Problem: Physical Therapy  Goal: Physical Therapy Goal  Description: Goals to be met by: 24     Patient will increase functional independence with mobility by performin. Supine to sit with Modified [place]  2. Sit to supine with Modified [place]  3. Sit to stand transfer with Modified [place]  4. Bed to chair transfer with Modified [place] using Rolling Walker  5. Gait  x 200 feet with Modified [place] using Rolling Walker.     Outcome: Ongoing, Progressing

## 2024-04-12 NOTE — PROGRESS NOTES
TERTIARY TRAUMA SURVEY (TTS)    List Injuries Identified to Date:   Intraparenchymal hemorrhage left temporal lobe, 1.2 x 1.1 x 0.7 cm     [x]Problems list reviewed  List Operations and Procedures:   None    Past Surgical History:   Procedure Laterality Date    ABLATION      BRAIN SURGERY      aneurysm    COLONOSCOPY      CYSTOSCOPY W/ URETERAL STENT PLACEMENT Left 7/5/2023    Procedure: CYSTOSCOPY, WITH URETERAL STENT INSERTION;  Surgeon: Viet Barkley MD;  Location: [facility];  Service: Urology;  Laterality: Left;  LEFT    HYSTERECTOMY      INTRAMEDULLARY RODDING OF HUMERUS Left 10/26/2022    Procedure: INSERTION, INTRAMEDULLARY LEATHA, HUMERUS;  Surgeon: Doc Scherer DO;  Location: [facility] OR;  Service: Orthopedics;  Laterality: Left;       Incidental findings:   Bilateral renal cysts     Past Medical History:   HTN  Afib on Xarelto  HLD  CAD    Active Ambulatory Problems     Diagnosis Date Noted    Fracture of humeral shaft, right, closed 10/26/2022    Atrial flutter, unspecified type 02/07/2023    Aortic atherosclerosis 02/07/2023    Kidney stone 07/05/2023    Primary hypertension 07/09/2023    Annual physical exam 02/23/2024    Cerebral aneurysm, nonruptured 04/11/2024    Atrial fibrillation 04/11/2024    Intracranial subarachnoid hemorrhage 04/11/2024    Osteoarthritis 04/11/2024     Resolved Ambulatory Problems     Diagnosis Date Noted    Left flank pain 07/05/2023    Hospital discharge follow-up 07/13/2023     Past Medical History:   Diagnosis Date    Coronary artery disease     Fractures     Hypercholesterolemia     Hypertension     Respiratory distress      Past Medical History:   Diagnosis Date    Coronary artery disease     Fractures     Hypercholesterolemia     Hypertension     Respiratory distress        Tertiary Physical Exam:     Physical Exam  Constitutional:       General: She is not in acute distress.     Appearance: Normal appearance. She is not toxic-appearing.   HENT:      Head:  Normocephalic and atraumatic.      Nose: Nose normal.      Mouth/Throat:      Mouth: Mucous membranes are moist.      Pharynx: Oropharynx is clear.   Eyes:      Pupils: Pupils are equal, round, and reactive to light.   Cardiovascular:      Rate and Rhythm: Normal rate.      Pulses: Normal pulses.   Pulmonary:      Effort: Pulmonary effort is normal. No respiratory distress.   Abdominal:      General: There is no distension.      Palpations: Abdomen is soft.      Tenderness: There is no abdominal tenderness. There is no guarding.   Musculoskeletal:         General: No swelling or tenderness. Normal range of motion.      Cervical back: Normal range of motion. No tenderness.   Skin:     General: Skin is warm and dry.      Capillary Refill: Capillary refill takes less than 2 seconds.   Neurological:      General: No focal deficit present.      Mental Status: She is alert and oriented to person, place, and time. Mental status is at baseline.   Psychiatric:         Mood and Affect: Mood normal.         Behavior: Behavior normal.         Thought Content: Thought content normal.         Judgment: Judgment normal.         Imaging Review:     Imaging Results              CT Cervical Spine Without Contrast (Final result)  Result time 04/11/24 12:50:39      Final result by Brian Tan MD (04/11/24 12:50:39)                   Impression:      1. Nondisplaced fracture of the left lamina of C7.  There are areas of sclerosis associated with this fracture which suggests chronicity.  Please correlate with exam.      Electronically signed by: Brian Tan MD  Date:    04/11/2024  Time:    12:50               Narrative:    EXAMINATION:  CT CERVICAL SPINE WITHOUT CONTRAST    CLINICAL HISTORY:  Neck trauma (Age >= 65y);    TECHNIQUE:  Axial, helical imaging of the cervical spine was performed.  Axial, sagittal and coronal images were generated.  Soft tissue and bone algorithms were applied.  Automated exposure control was  utilized to limit radiation exposure to the patient.   Total DLP for this study was  310 mgycm.    COMPARISON:  CT of the cervical spine 10/09/2022    FINDINGS:  There is a nondisplaced fracture of the left lamina of C7.  There is minimal grade 1 retrolisthesis of C2 on C3.  There is grade 1 anterolisthesis of C4 on C5.  There is grade 1 anterolisthesis of C7 on T1. Vertebral body heights, alignment and density are otherwise within normal limits. Multilevel disc and joint disease are noted.  There is no displaced fracture.  There is no acute paravertebral soft tissue abnormality.                                       CT Chest Abdomen Pelvis With IV Contrast (XPD) NO Oral Contrast (Final result)  Result time 04/11/24 12:50:11      Final result by Asia Lau MD (04/11/24 12:50:11)                   Impression:      No evidence of acute trauma      Electronically signed by: Frank Lau  Date:    04/11/2024  Time:    12:50               Narrative:    EXAMINATION:  CT CHEST ABDOMEN PELVIS WITH IV CONTRAST (XPD)    CLINICAL HISTORY:  Polytrauma, blunt;    TECHNIQUE:  Low dose axial images, sagittal and coronal reformations were obtained from the thoracic inlet to the pubic symphysis following the IV contrast administration. Automatic exposure control is utilized to reduce patient radiation exposure.    COMPARISON:  07/05/2023    FINDINGS:  The lungs are adequately aerated.  No pneumothorax is seen.  No pulmonary contusion is seen.  No pleural effusion is seen.  No infiltrate is seen.    The thoracic aorta is normal in caliber.  No dissection or aneurysm is seen.  No retrosternal hematoma is seen.    The abdominal aorta appears grossly unremarkable.  No dissection or posttraumatic changes are seen.    The heart appears normal.    The liver appears normal.  No liver mass or lesion is seen.  No evidence of liver laceration is seen.    The gallbladder appears normal.  No gallstones are seen..    The spleen  appears normal.  No splenic laceration is seen.  The pancreas appears grossly unremarkable.  No pancreatic mass or lesion is seen.  No inflammation is seen.    No adrenal abnormality is seen.  No adrenal nodule is seen.    The kidneys are well perfused.  There is pelvicaliceal dilatation on the right side which was seen on the prior examination as well.  Similar changes are seen in the left kidney.  Some parapelvic cysts are also seen in the left kidney.  Some cortical cysts are seen bilaterally.  Nonspecific perinephric stranding seen around both kidneys which are seen on the prior examination as well..    Visualized portions of the bowel shows no acute abnormality.  No colitis is seen.  No diverticulitis is seen.  No colonic mass is seen.    No free air is seen.  No free fluid is seen.    Urinary bladder appears unremarkable.    No sternal fracture is seen.  No thoracic spine fracture is seen.  There is evidence of dextroscoliosis of the lumbar spine.  No lumbar spine fracture is seen.  No pelvic fracture is seen.  No rib fractures are seen.    There is a intramedullary shari in the left humerus for stabilization with old fracture.  No obvious periprosthetic fracture seen.                                       X-Ray Chest AP Portable (Final result)  Result time 04/11/24 12:18:27      Final result by Vivek Wise MD (04/11/24 12:18:27)                   Impression:      Minimal increase interstitial markings likely related to poor inspiratory effort.    Mild cardiomegaly.    Otherwise no active pulmonary disease      Electronically signed by: Vivek Wise  Date:    04/11/2024  Time:    12:18               Narrative:    EXAMINATION:  XR CHEST AP PORTABLE    CLINICAL HISTORY:  Dorsalgia, unspecified    TECHNIQUE:  Single frontal view of the chest was performed.    COMPARISON:  October 17, 2022    FINDINGS:  Examination reveals mediastinal silhouette to be within normal limits cardiac silhouette is mildly  enlarged there is a poor inspiratory effort that accentuates the pulmonary vascular markings no focal consolidative changes are clearly identified                                       Lab Review:   CBC:  Recent Labs   Lab Result Units 02/16/24  0607 04/11/24  1058 04/12/24  0403   WBC x10(3)/mcL 8.06 8.24 9.58   RBC x10(6)/mcL 4.29 4.55 4.11*   Hgb g/dL 13.5 14.1 12.9   Hct % 42.1 43.6 39.0   Platelet x10(3)/mcL 167 180 179   MCV fL 98.1* 95.8* 94.9*   MCH pg 31.5* 31.0 31.4*   MCHC g/dL 32.1* 32.3* 33.1       CMP:  Recent Labs   Lab Result Units 02/16/24  0607 04/11/24  1058 04/12/24  0403   Calcium Level Total mg/dL 9.4 10.0 9.1   Albumin Level g/dL 3.5 3.7 3.1*   Sodium Level mmol/L 141 141 139   Potassium Level mmol/L 4.5 4.0 4.5   Carbon Dioxide mmol/L 27 26 26   Blood Urea Nitrogen mg/dL 21.1* 13.2 20.8*   Creatinine mg/dL 0.84 0.74 0.94   Alkaline Phosphatase unit/L 130 132 108   Alanine Aminotransferase unit/L 13 8 7   Aspartate Aminotransferase unit/L 15 10 9   Bilirubin Total mg/dL 0.5 0.6 0.5       Plan:     Small IPH, C7 fx (likely chronic)  - Neurosurgery following  - Repeat CT head this AM stable  - Maintain BP < 140/90  - Keppra BID  - SCDs, hold Xarelto   - soft c-collar as needed for comfort     Fall  - Regular diet  - Daily labs   - MM pain control  - Frequent IS  - PT/OT  - SCDs for VTE ppx     Lelia Landon, AGACNP-BC, FNP-BC  Trauma Surgery  [facility]  C: [phone]

## 2024-04-12 NOTE — PLAN OF CARE
Problem: Occupational Therapy  Goal: Occupational Therapy Goal  Description: LTG: Pt will perform basic ADLs and ADL transfers with Modified independence and good compliance to sternal precautions using LRAD by discharge.    STG: to be met by 4/10/24:    Pt will complete grooming standing at sink with LRAD with SBA.  Pt will complete UB dressing with SBA.  Pt will complete LB dressing with SBA using LRAD and AE prn.  Pt will complete toileting with SBA using LRAD.  Pt will complete functional mobility to/from toilet and toilet transfer with SBA using LRAD.   Pt will independently verbalize sternal precautions with >90% accuracy.   Outcome: Ongoing, Progressing

## 2024-04-12 NOTE — DISCHARGE SUMMARY
[facility] - Ortho Neuro  Trauma Surgery  Discharge Summary      Patient Name: Sumaya Moss  MRN: [id number]  Admission Date: 4/11/2024  Hospital Length of Stay: 1 days  Discharge Date and Time:  04/12/2024 12:32 PM  Attending Physician: Arthur Infante MD   Discharging Provider: Lelia Landon NP  Primary Care Provider: Benedict Jackson II, MD    HPI:   Patient is a 83 year old female who reported to the ED following an outpatient CT head that was significant for small ICH. She reports she fell on 3/28 after slipping in her bathroom and landed on her side. She is on Xarelto for Afib. She also reported lower back pain.     * No surgery found *      Indwelling Lines/Drains at time of discharge:   Lines/Drains/Airways       None                 Hospital Course: Patient is a 83 year old female who reported to the ED following an outpatient CT head that was significant for small ICH. She reports she fell on 3/28 after slipping in her bathroom and landed on her side. She is on Xarelto for Afib. She was admitted for monitoring with consult to Neurosurgery and repeat CT imaging. Her repeat CT head was stable and she was cleared for discharge home per Neurosurgery. She has been educated to hold her Xarelto for 7 days and to continue Keppra for a total of 7 days. She will follow up with her PCP and Neurosurgery as needed.    Goals of Care Treatment Preferences:  Code Status: Full Code      Consults:   Consults (From admission, onward)          Status Ordering Provider     Inpatient consult to Neurosurgery  Once        Provider:  Gilmer Schultz MD    Completed RAJEEV JARRETT            Significant Diagnostic Studies: Labs: CMP   Recent Labs   Lab 04/11/24  1058 04/12/24  0403    139   K 4.0 4.5   CO2 26 26   BUN 13.2 20.8*   CREATININE 0.74 0.94   CALCIUM 10.0 9.1   ALBUMIN 3.7 3.1*   BILITOT 0.6 0.5   ALKPHOS 132 108   AST 10 9   ALT 8 7    and CBC   Recent Labs   Lab  04/11/24  1058 04/12/24  0403   WBC 8.24 9.58   HGB 14.1 12.9   HCT 43.6 39.0    179       Pending Diagnostic Studies:       None          Final Active Diagnoses:    Diagnosis Date Noted POA    PRINCIPAL PROBLEM:  Intracranial hemorrhage [I62.9] 04/11/2024 Yes    Fall [W19.XXXA] 04/11/2024 Yes      Problems Resolved During this Admission:      Discharged Condition: good    Disposition: Home or Self Care    Follow Up:   Follow-up Information       Benedict Jackson II, MD. Call.    Specialty: Internal Medicine  Why: As needed  Contact information:  [address] [place] [address]  [phone]               Gilmer Schultz MD. Call.    Specialty: Neurosurgery  Why: As needed  Contact information:  [address] [place] [address] [address] [id number]  [phone]                           Patient Instructions:      Notify your health care provider if you experience any of the following:  severe persistent headache     Notify your health care provider if you experience any of the following:  persistent dizziness, light-headedness, or visual disturbances     Notify your health care provider if you experience any of the following:  increased confusion or weakness     Medications:  Reconciled Home Medications:      Medication List        START taking these medications      levETIRAcetam 500 MG Tab  Commonly known as: KEPPRA  Take 1 tablet (500 mg total) by mouth 2 (two) times daily. for 6 days     methocarbamoL 500 MG Tab  Commonly known as: ROBAXIN  Take 1 tablet (500 mg total) by mouth every 8 (eight) hours. for 10 days            CONTINUE taking these medications      acetaminophen 650 MG Tbsr  Commonly known as: TYLENOL  Take 1 tablet (650 mg total) by mouth every 8 (eight) hours as needed (pain).     atorvastatin 10 MG tablet  Commonly known as: LIPITOR  Take 10 mg by mouth once daily.     doxazosin 4 MG tablet  Commonly known as: CARDURA  Take 4 mg by mouth once daily.     flecainide 50 MG Tab  Commonly  known as: TAMBOCOR  Take 50 mg by mouth 2 (two) times a day.     losartan 50 MG tablet  Commonly known as: COZAAR  Take 50 mg by mouth 2 (two) times a day.     metoprolol succinate 100 MG 24 hr tablet  Commonly known as: TOPROL-XL  100 mg once daily.            STOP taking these medications      XARELTO 20 mg Tab  Generic drug: rivaroxaban            Time spent on the discharge of patient: 20 minutes    Lelia Landon, AGACNP-BC, FNP-BC  Trauma Surgery  [facility] - [facility]

## 2024-04-15 ENCOUNTER — TELEPHONE (OUTPATIENT)
Dept: INTERNAL MEDICINE | Facility: CLINIC | Age: 84
End: 2024-04-15
Payer: MEDICARE

## 2024-04-15 ENCOUNTER — PATIENT OUTREACH (OUTPATIENT)
Dept: ADMINISTRATIVE | Facility: CLINIC | Age: 84
End: 2024-04-15
Payer: MEDICARE

## 2024-04-15 DIAGNOSIS — M25.561 ACUTE PAIN OF BOTH KNEES: ICD-10-CM

## 2024-04-15 DIAGNOSIS — M54.41 ACUTE BILATERAL LOW BACK PAIN WITH BILATERAL SCIATICA: Primary | ICD-10-CM

## 2024-04-15 DIAGNOSIS — Z91.81 HISTORY OF FALL: ICD-10-CM

## 2024-04-15 DIAGNOSIS — M54.9 DORSALGIA, UNSPECIFIED: ICD-10-CM

## 2024-04-15 DIAGNOSIS — M54.42 ACUTE BILATERAL LOW BACK PAIN WITH BILATERAL SCIATICA: Primary | ICD-10-CM

## 2024-04-15 DIAGNOSIS — M25.562 ACUTE PAIN OF BOTH KNEES: ICD-10-CM

## 2024-04-15 NOTE — TELEPHONE ENCOUNTER
Pt's  called stating pt is still having pain radiating down her legs from her recent fall and is having difficulty walking. She did do a lumbar spine xray but states they did not get results. Please advise

## 2024-04-15 NOTE — TELEPHONE ENCOUNTER
Pt's  notified of referral to [facility] and Dr Zheng. Advised that she will need a CT scan as well and that they will contact her to schedule, voiced understanding. Per Minor she sent the referral to [facility].

## 2024-04-15 NOTE — TELEPHONE ENCOUNTER
----- Message from Shikha Mitchell NP sent at 4/15/2024 12:02 PM CDT -----  Regarding: RE: medical advice  I would like to refer her to Ortho of choice. If no preference, please let me know. I will send to [facility]  ----- Message -----  From: Juan Belcher MA  Sent: 4/15/2024   8:24 AM CDT  To: Shikha Mitchell NP  Subject: FW: medical advice                               Spoke with adelfo. Mrs. Burris went to hospital Thursday and was released Friday afternoon. She is still in a lot of pain in the back and knees. Mr. Emanuel has concerns because she is barely walking. He would like a call back from you or Dr. Jackson.  ----- Message -----  From: Leanna Boyle  Sent: 4/15/2024   8:16 AM CDT  To: Manuel Mcmullen Staff; Stephen Trivedi Staff  Subject: medical advice                                   Type:  Needs Medical Advice    Who Called: Adelfo(pts )    Symptoms (please be specific): Recent hospital stay(still having problems walking and back pain)     Would the patient rather a call back or a response via [facility]?     Best Call Back Number: [phone]    Additional Information: Adelfo is requesting a call back from Dr Jackson  or Shikha. His wife was recently released from hospital and still having problems. (No appts available for today).

## 2024-04-15 NOTE — PROGRESS NOTES
C3 nurse spoke with Sumaya Moss and  Hari for a TCC post hospital discharge follow up call. The patient does not have a scheduled HOSFU appointment with Benedict Jackson II, MD  within 5-7 days post hospital discharge date 4/12/24. C3 nurse was unable to schedule HOSFU appointment in [facility].    Message sent to PCP staff requesting they contact patient and schedule follow up appointment.

## 2024-04-15 NOTE — TELEPHONE ENCOUNTER
Spoke with pt. Pt verbalized understanding. Pt is okay with referral. No preference. Pt is still awaiting phone call from either Dr. Jackson or Shikha.

## 2024-04-15 NOTE — TELEPHONE ENCOUNTER
Referral sent to [facility] for PT/OT; CT back ordered. Referral sent to Ortho

## 2024-04-16 ENCOUNTER — PATIENT MESSAGE (OUTPATIENT)
Dept: ADMINISTRATIVE | Facility: OTHER | Age: 84
End: 2024-04-16
Payer: MEDICARE

## 2024-04-17 ENCOUNTER — TELEPHONE (OUTPATIENT)
Dept: INTERNAL MEDICINE | Facility: CLINIC | Age: 84
End: 2024-04-17
Payer: MEDICARE

## 2024-04-17 NOTE — TELEPHONE ENCOUNTER
Spoke with pt. Pt verbalized understanding. States she is feeling better and will monitor s/s for the next few weeks and will call office for referral if needed to spine specialist.

## 2024-04-17 NOTE — TELEPHONE ENCOUNTER
----- Message from Shikha Mitchell NP sent at 4/17/2024  7:47 AM CDT -----  Please call and check on patient. How is back pain?  Let her know CT back neg for fracture. Advance multilevel chronic changes noted. Continued recommendations to proceed with HH PT/OT with consideration for referral to spinal specialist in the next few weeks if s/s persist/worsen for possible injections.

## 2024-04-18 ENCOUNTER — OFFICE VISIT (OUTPATIENT)
Dept: INTERNAL MEDICINE | Facility: CLINIC | Age: 84
End: 2024-04-18
Payer: MEDICARE

## 2024-04-18 VITALS
HEIGHT: 65 IN | OXYGEN SATURATION: 98 % | DIASTOLIC BLOOD PRESSURE: 78 MMHG | BODY MASS INDEX: 30.66 KG/M2 | WEIGHT: 184 LBS | HEART RATE: 64 BPM | SYSTOLIC BLOOD PRESSURE: 120 MMHG

## 2024-04-18 DIAGNOSIS — I67.1 CEREBRAL ANEURYSM, NONRUPTURED: ICD-10-CM

## 2024-04-18 DIAGNOSIS — M50.30 DDD (DEGENERATIVE DISC DISEASE), CERVICAL: ICD-10-CM

## 2024-04-18 DIAGNOSIS — I60.9 INTRACRANIAL SUBARACHNOID HEMORRHAGE: ICD-10-CM

## 2024-04-18 DIAGNOSIS — M51.36 DDD (DEGENERATIVE DISC DISEASE), LUMBAR: ICD-10-CM

## 2024-04-18 DIAGNOSIS — Z09 HOSPITAL DISCHARGE FOLLOW-UP: Primary | ICD-10-CM

## 2024-04-18 PROCEDURE — 99495 TRANSJ CARE MGMT MOD F2F 14D: CPT | Mod: ,,, | Performed by: INTERNAL MEDICINE

## 2024-04-18 RX ORDER — GABAPENTIN 300 MG/1
300 CAPSULE ORAL NIGHTLY
Qty: 30 CAPSULE | Refills: 5 | Status: SHIPPED | OUTPATIENT
Start: 2024-04-18 | End: 2024-05-30

## 2024-04-18 NOTE — PROGRESS NOTES
Patient ID: Sumaya Moss is a 83 y.o. female.    Chief Complaint: Transitional Care (Hospital f/u from recent fall/C/o shooting pains in her legs, back pain/She will be starting PT with HH )      HPI:   Patient presents here today for above reason.     ninfa is a 83-year-old female presents today hospital follow-up following a recent fall and intraparenchymal hemorrhage.  She was monitored for couple days and no further bleeding.  He was an accidental fall she slipped on some powder.  No loss of consciousness she is on Keppra currently which is currently stable.  She is complaining of some lower back pain with radiation down the legs.  She will be starting physical therapy with home health.  X-rays and CT of the lumbar spine were negative for acute fracture she has significant and severe degenerative disc disease and facet arthropathy.    The patient's Health Maintenance was reviewed and the following appears to be due at this time:   Health Maintenance Due   Topic Date Due    Shingles Vaccine (1 of 2) Never done    RSV Vaccine (Age 60+ and Pregnant patients) (1 - 1-dose 60+ series) Never done    Pneumococcal Vaccines (Age 65+) (2 of 2 - PCV) 12/31/2020        Past Medical History:  Past Medical History:   Diagnosis Date    Coronary artery disease     Fractures     Hypercholesterolemia     Hypertension     Respiratory distress      Past Surgical History:   Procedure Laterality Date    ABLATION      BRAIN SURGERY      aneurysm    COLONOSCOPY      CYSTOSCOPY W/ URETERAL STENT PLACEMENT Left 7/5/2023    Procedure: CYSTOSCOPY, WITH URETERAL STENT INSERTION;  Surgeon: Viet Barkley MD;  Location: [facility];  Service: Urology;  Laterality: Left;  LEFT    HYSTERECTOMY      INTRAMEDULLARY RODDING OF HUMERUS Left 10/26/2022    Procedure: INSERTION, INTRAMEDULLARY LEATHA, HUMERUS;  Surgeon: Doc Scherer DO;  Location: [facility] OR;  Service: Orthopedics;  Laterality: Left;     Review of patient's allergies indicates:  No  Known Allergies  Current Outpatient Medications on File Prior to Visit   Medication Sig Dispense Refill    acetaminophen (TYLENOL) 650 MG TbSR Take 1 tablet (650 mg total) by mouth every 8 (eight) hours as needed (pain). 30 tablet 0    atorvastatin (LIPITOR) 10 MG tablet Take 10 mg by mouth once daily.      doxazosin (CARDURA) 4 MG tablet Take 4 mg by mouth once daily.      flecainide (TAMBOCOR) 50 MG Tab Take 50 mg by mouth 2 (two) times a day.      levETIRAcetam (KEPPRA) 500 MG Tab Take 1 tablet (500 mg total) by mouth 2 (two) times daily. for 6 days 12 tablet 0    losartan (COZAAR) 50 MG tablet Take 50 mg by mouth 2 (two) times a day.      methocarbamoL (ROBAXIN) 500 MG Tab Take 1 tablet (500 mg total) by mouth every 8 (eight) hours. for 10 days 30 tablet 0    metoprolol succinate (TOPROL-XL) 100 MG 24 hr tablet 100 mg once daily.       No current facility-administered medications on file prior to visit.     Social History     Socioeconomic History    Marital status:    Tobacco Use    Smoking status: Never     Passive exposure: Never    Smokeless tobacco: Never   Substance and Sexual Activity    Alcohol use: Never    Drug use: Never    Sexual activity: Yes   Social History Narrative    ** Merged History Encounter **          Social Determinants of Health     Financial Resource Strain: Low Risk  (4/11/2024)    Overall Financial Resource Strain (CARDIA)     Difficulty of Paying Living Expenses: Not hard at all   Food Insecurity: No Food Insecurity (4/11/2024)    Hunger Vital Sign     Worried About Running Out of Food in the Last Year: Never true     Ran Out of Food in the Last Year: Never true   Transportation Needs: No Transportation Needs (4/11/2024)    PRAPARE - Transportation     Lack of Transportation (Medical): No     Lack of Transportation (Non-Medical): No   Physical Activity: Inactive (4/11/2024)    Exercise Vital Sign     Days of Exercise per Week: 0 days     Minutes of Exercise per Session: 0 min  "  Stress: No Stress Concern Present (4/11/2024)    [employer] [employer]ational [employer] - Occupational Stress Questionnaire     Feeling of Stress : Not at all   Social Connections: Unknown (4/11/2024)    Social Connection and Isolation Panel [NHANES]     Frequency of Communication with Friends and Family: Patient declined     Frequency of Social Gatherings with Friends and Family: Patient declined     Active Member of Clubs or Organizations: No     Attends Club or Organization Meetings: Never     Marital Status:    Housing Stability: Unknown (4/11/2024)    Housing Stability Vital Sign     Number of Places Lived in the Last Year: 1     Unstable Housing in the Last Year: No     Family History   Problem Relation Name Age of Onset    No Known Problems Mother      No Known Problems Father         ROS:   Comprehensive review of systems was performed and is negative except as noted above    Vitals/PE:   /78 (BP Location: Left arm, Patient Position: Sitting)   Pulse 64   Ht 5' 5" (1.651 m)   Wt 83.5 kg (184 lb)   SpO2 98%   BMI 30.62 kg/m²   Physical Exam    General: Alert and oriented, No acute distress.   Eye: Normal conjunctiva without exudate.  HENMT: Normocephalic/AT, Normal hearing, Oral mucosa is moist and pink   Neck: No goiter visualized.   Respiratory: Lungs CTAB, Respirations are non-labored, Breath sounds are equal, Symmetrical chest wall expansion.  Cardiovascular: Normal rate, Regular rhythm, No murmur, No edema.   Gastrointestinal: Non-distended.   Genitourinary: Deferred.  Musculoskeletal: Normal ROM, Normal gait, No deformities or amputations.  Integumentary: Warm, Dry, Intact. No diaphoresis, or flushing.  Neurologic: No focal deficits, Cranial Nerves II-XII are grossly intact.   Psychiatric: Cooperative, Appropriate mood & affect, Normal judgment, Non-suicidal.    Assessment/Plan:       1. Hospital discharge follow-up    2. Cerebral aneurysm, nonruptured    3. Intracranial " subarachnoid hemorrhage    4. DDD (degenerative disc disease), lumbar    5. DDD (degenerative disc disease), cervical         Plan:  She is doing much better no neurological symptoms.    Continue Keppra.    She continues with radicular pain x-rays and CT scans were negative for any acute fracture.  She did have a CT of the cervical spine that showed an old chronic C7 healed fracture.    Still complaining of some back pain despite workup will continue with physical therapy.  Will send to [facility] for further evaluation for possible epidural injections however she does have significant degenerative disc disease and facet disease.  Will place on gabapentin 300 mg at night.    Education and counseling done face to face regarding medical conditions and plan. Contact office if new symptoms develop. Should any symptoms ever significantly worsen seek emergency medical attention/go to ER. Follow up at least yearly for wellness or sooner PRN. Nurse to call patient with any results. The patient is receptive, expresses understanding and is agreeable to plan. All questions have been answered.    No follow-ups on file.

## 2024-04-22 ENCOUNTER — OFFICE VISIT (OUTPATIENT)
Dept: ORTHOPEDICS | Facility: CLINIC | Age: 84
End: 2024-04-22
Payer: MEDICARE

## 2024-04-22 VITALS
SYSTOLIC BLOOD PRESSURE: 153 MMHG | HEART RATE: 56 BPM | DIASTOLIC BLOOD PRESSURE: 78 MMHG | HEIGHT: 65 IN | WEIGHT: 184.06 LBS | BODY MASS INDEX: 30.67 KG/M2

## 2024-04-22 DIAGNOSIS — M25.562 ACUTE PAIN OF BOTH KNEES: ICD-10-CM

## 2024-04-22 DIAGNOSIS — M25.561 ACUTE PAIN OF BOTH KNEES: ICD-10-CM

## 2024-04-22 PROCEDURE — 99213 OFFICE O/P EST LOW 20 MIN: CPT | Mod: ,,, | Performed by: REHABILITATION UNIT

## 2024-04-22 RX ORDER — CHOLECALCIFEROL (VITAMIN D3) 25 MCG
1000 TABLET ORAL DAILY
COMMUNITY

## 2024-04-25 ENCOUNTER — TELEPHONE (OUTPATIENT)
Dept: INTERNAL MEDICINE | Facility: CLINIC | Age: 84
End: 2024-04-25
Payer: MEDICARE

## 2024-04-25 DIAGNOSIS — I60.9 INTRACRANIAL SUBARACHNOID HEMORRHAGE: ICD-10-CM

## 2024-04-25 DIAGNOSIS — I67.1 CEREBRAL ANEURYSM, NONRUPTURED: Primary | ICD-10-CM

## 2024-04-25 NOTE — TELEPHONE ENCOUNTER
----- Message from Uriah Madera sent at 4/25/2024 12:09 PM CDT -----  .Type:  Patient Returning Call    Who Called:pt   Who Left Message for Patient:  Does the patient know what this is regarding?:medication question   Would the patient rather a call back or a response via [facility]? Call back   Best Call Back Number:[phone]  Additional Information:

## 2024-04-25 NOTE — TELEPHONE ENCOUNTER
Pt called stating she was taken off of Xarelto 2 weeks ago at the hospital and she wants to know when she can resume medication. Please advise

## 2024-04-26 ENCOUNTER — TELEPHONE (OUTPATIENT)
Dept: NEUROSURGERY | Facility: CLINIC | Age: 84
End: 2024-04-26
Payer: MEDICARE

## 2024-04-26 NOTE — TELEPHONE ENCOUNTER
The patient is being referred to Dr. Roe by Dr. Benedict Jackson II for cerebral aneurysm non-ruptured.  Upon reading her chart the patient was seen by Dr. Roe as a hospital consult for intracranial hemorrhage.  Last progress note from 4/12/24 states patient does not need a neurosurgical intervention or follow up.  Per Dr. Jackson not the patient would like to resume her Xarelto, but they would like the patient to be cleared by us first to resume.  Please advise.  BH

## 2024-04-29 NOTE — TELEPHONE ENCOUNTER
Does the patient and her situation with Dr. Roe.  It was okay to resume Xarelto.  She does not need to follow-up with us.  She came to seen as needed.

## 2024-04-30 NOTE — TELEPHONE ENCOUNTER
Pt is calling to see when she can resume xarelto and if she needs to have another CT scan of the brain to check the bleed. I know MD wanted her to be seen by neuro but can you read the encounter notes from Dr Roe regarding the referral. Please advise

## 2024-05-04 ENCOUNTER — EXTERNAL HOME HEALTH (OUTPATIENT)
Dept: HOME HEALTH SERVICES | Facility: HOSPITAL | Age: 84
End: 2024-05-04
Payer: MEDICARE

## 2024-05-07 ENCOUNTER — DOCUMENT SCAN (OUTPATIENT)
Dept: HOME HEALTH SERVICES | Facility: HOSPITAL | Age: 84
End: 2024-05-07
Payer: MEDICARE

## 2024-05-16 ENCOUNTER — OFFICE VISIT (OUTPATIENT)
Dept: PAIN MEDICINE | Facility: CLINIC | Age: 84
End: 2024-05-16
Payer: MEDICARE

## 2024-05-16 VITALS
DIASTOLIC BLOOD PRESSURE: 69 MMHG | WEIGHT: 184 LBS | SYSTOLIC BLOOD PRESSURE: 134 MMHG | HEART RATE: 59 BPM | HEIGHT: 65 IN | BODY MASS INDEX: 30.66 KG/M2

## 2024-05-16 DIAGNOSIS — M54.2 CHRONIC NECK PAIN: ICD-10-CM

## 2024-05-16 DIAGNOSIS — M51.16 INTERVERTEBRAL DISC DISORDERS WITH RADICULOPATHY, LUMBAR REGION: ICD-10-CM

## 2024-05-16 DIAGNOSIS — G89.29 CHRONIC BACK PAIN GREATER THAN 3 MONTHS DURATION: Primary | ICD-10-CM

## 2024-05-16 DIAGNOSIS — M50.30 DDD (DEGENERATIVE DISC DISEASE), CERVICAL: ICD-10-CM

## 2024-05-16 DIAGNOSIS — M51.36 DDD (DEGENERATIVE DISC DISEASE), LUMBAR: ICD-10-CM

## 2024-05-16 DIAGNOSIS — G89.29 CHRONIC NECK PAIN: ICD-10-CM

## 2024-05-16 DIAGNOSIS — M54.9 CHRONIC BACK PAIN GREATER THAN 3 MONTHS DURATION: Primary | ICD-10-CM

## 2024-05-16 PROBLEM — M51.369 DDD (DEGENERATIVE DISC DISEASE), LUMBAR: Status: ACTIVE | Noted: 2024-05-16

## 2024-05-16 PROCEDURE — 99203 OFFICE O/P NEW LOW 30 MIN: CPT | Mod: ,,, | Performed by: ANESTHESIOLOGY

## 2024-05-16 NOTE — PROGRESS NOTES
Melissa Ventura MD        PATIENT NAME: Sumaya Moss  : 1940  DATE: 24  MRN: [id number]      Billing Provider: Melissa Ventura MD  Level of Service:   Patient PCP Information       Provider PCP Type    JAMISON MARQUEZ MD General            Reason for Visit / Chief Complaint: Back Pain and Neck Pain (DDD lumbar & cervical, referral from Dr Jamison Marquez  cannot have MRI done has Lumbar & cervical CT's in epic C/O pain level 7,Taking Gabapentin for pain )       Update PCP  Update Chief Complaint         History of Present Illness / Problem Focused Workflow     Sumaya Moss presents to the clinic with Back Pain and Neck Pain (DDD lumbar & cervical, referral from Dr Jamison Marquez  cannot have MRI done has Lumbar & cervical CT's in epic C/O pain level 7,Taking Gabapentin for pain )     This is an 83-year-old female who presents to clinic today for her initial consultation as a referral from her primary care physician.  She complains of with neck pain and low back pain that has been present for years.  She had a fall shortly before  this year, and her back pain has been worse since then.  However, she does note that it is starting to gradually improve now.  She does still have excruciating back pain if she stands or walks for too long.  She was prescribed some gabapentin, which has been helping somewhat.  She also started doing home health physical therapy.  She takes Tylenol most days for relief.  She currently rates the pain as 7/10 on the NRS, which is average.  Her pain had gotten up to 10/10 at worst right after she fell.  She alternates ice packs and a heating pad for relief as well.  She has not undergone any previous injections or surgery on her lumbar spine.    Back Pain    Neck Pain         Review of Systems     Review of Systems   Musculoskeletal:  Positive for back pain, gait problem, neck pain and neck stiffness.   Psychiatric/Behavioral:  Positive for sleep  disturbance.    All other systems reviewed and are negative.       Medical / Social / Family History     Past Medical History:   Diagnosis Date    Coronary artery disease     Fractures     Hypercholesterolemia     Hypertension     Respiratory distress        Past Surgical History:   Procedure Laterality Date    ABLATION      BRAIN SURGERY      aneurysm    COLONOSCOPY      CYSTOSCOPY W/ URETERAL STENT PLACEMENT Left 7/5/2023    Procedure: CYSTOSCOPY, WITH URETERAL STENT INSERTION;  Surgeon: Viet Barkley MD;  Location: [facility];  Service: Urology;  Laterality: Left;  LEFT    HYSTERECTOMY      INTRAMEDULLARY RODDING OF HUMERUS Left 10/26/2022    Procedure: INSERTION, INTRAMEDULLARY LEATHA, HUMERUS;  Surgeon: Doc Scherer DO;  Location: [facility];  Service: Orthopedics;  Laterality: Left;       Social History  Ms. Moss  reports that she has never smoked. She has never been exposed to tobacco smoke. She has never used smokeless tobacco. She reports that she does not drink alcohol and does not use drugs.    Family History  Ms.'s Moss family history includes No Known Problems in her father and mother.    Medications and Allergies     Medications  No outpatient medications have been marked as taking for the 5/16/24 encounter (Office Visit) with Melissa Ventura MD.       Allergies  Review of patient's allergies indicates:  No Known Allergies    Physical Examination     Vitals:    05/16/24 1314   BP: 134/69   Pulse: (!) 59     Pain Disability Index (PDI): 51       Spine Musculoskeletal Exam    Gait    Assistive device: wheelchair    Inspection    Thoracolumbar    Thoracolumbar inspection is normal.    Palpation    Thoracolumbar    Tenderness: none    Right      Masses: none      Spasms: none      Crepitus: none      Muscle tone: normal    Left      Masses: none      Spasms: none      Crepitus: none      Muscle tone: normal    Range of Motion    Thoracolumbar        Thoracolumbar range of motion additional  comments: Limited range of motion in the lumbar spine due to pain.    Strength    Thoracolumbar    Thoracolumbar motor exam is normal.       Sensory    Thoracolumbar    Thoracolumbar sensation is normal.    General      Constitutional: appears stated age, well-developed and well-nourished    Scleral icterus: no    Labored breathing: no    Psychiatric: normal mood and affect and no acute distress    Neurological: alert and oriented x3    Skin: intact    Lymphadenopathy: none       Assessment and Plan (including Health Maintenance)      Problem List  Smart Sets  Document Outside HM   :    Plan:   Chronic back pain greater than 3 months duration    DDD (degenerative disc disease), lumbar  -     Ambulatory referral/consult to Pain Clinic    DDD (degenerative disc disease), cervical  -     Ambulatory referral/consult to Pain Clinic    Chronic neck pain           Problem List Items Addressed This Visit          Neuro    DDD (degenerative disc disease), cervical    DDD (degenerative disc disease), lumbar       Orthopedic    Chronic back pain greater than 3 months duration - Primary    Chronic neck pain         Future Appointments   Date Time Provider Department Center   9/11/2024  9:00 AM Benedict Jackson II, MD [id number]        There are no Patient Instructions on file for this visit.  No follow-ups on file.     Signature:  Melissa Ventura MD      Date of encounter: 5/16/24

## 2024-05-16 NOTE — LETTER
1940  Sumaya Moss     To: Dr. Choco Rice    Procedure bilateral transforaminal epidural steroid injection at L4   on 2024    The above patient is being scheduled for a procedure under fluoroscopy with iodine that will require to discontinue one or more of the following medications:     XX   Xarelto for 2 days       Please check one:    If patient is on Coumadin, will he/she need Lovenox in the interim?    ____  Yes        ____ No      Please check one:    ________  I will manage the Lovenox prescription before and after the injection.        Approved by: ___________________________  Date: ______________      Denied by: _____________________________  Date: ______________       [facility]   Melissa Ventura MD  [address] [address]  Main # [phone]  Fax # [phone]

## 2024-05-18 NOTE — PROGRESS NOTES
Subjective:      Patient ID: Sumaya Moss is a 83 y.o. female.    Chief Complaint: Knee Pain (Pete knee pain. Ongoing pain for a couple months. States she had a fall in April 2024. Ambulating with a wheelchair. States her pain has been increasing. Reports L is worse. Denies any swelling. XR in chart)    HPI:   Sumaya Moss is a 83 y.o. female who presents today for evaluation of her bilateral knees    History of Present Illness  The patient is an 83-year-old female who presents for evaluation of bilateral knee pain. She is accompanied by an adult male. I have seen her previously for her shoulder. She has also been under the care of Dr. Scherer.     The patient recounts an incident approximately 1.5 years ago where she fell flat on her feet, resulting in a fractured arm, knee injury, and ocular injury. Despite the injury, she was able to ambulate without concern. However, she experienced a subsequent fall before Easter, landing on her face and knees, which exacerbated her knee pain. Radiographic imaging of her knees revealed no fractures. Currently, she reports persistent tenderness in her knees, predominantly on the anterior and medial aspects. She has been utilizing a rollator for mobility for the past 1.5 weeks, but relies on a wheelchair for extended distances. Mild swelling has been observed in the knees. However, she notes an improvement in her condition compared to a month ago.    Supplemental Information  Her shoulder is doing good. The therapy was wonderful. She has pain in her back and spine, but it is degenerative. She hit her head and they did some CT scans and they found blood on the brain. They are unsure if that is from the recent fall or from the previous fall.    Past Medical History:   Diagnosis Date    Coronary artery disease     Fractures     Hypercholesterolemia     Hypertension     Respiratory distress      Past Surgical History:   Procedure Laterality Date    ABLATION      BRAIN  SURGERY      aneurysm    COLONOSCOPY      CYSTOSCOPY W/ URETERAL STENT PLACEMENT Left 7/5/2023    Procedure: CYSTOSCOPY, WITH URETERAL STENT INSERTION;  Surgeon: Viet Barkley MD;  Location: [facility] OR;  Service: Urology;  Laterality: Left;  LEFT    HYSTERECTOMY      INTRAMEDULLARY RODDING OF HUMERUS Left 10/26/2022    Procedure: INSERTION, INTRAMEDULLARY LEATHA, HUMERUS;  Surgeon: Doc Scherer DO;  Location: [facility] OR;  Service: Orthopedics;  Laterality: Left;     Social History     Socioeconomic History    Marital status:    Tobacco Use    Smoking status: Never     Passive exposure: Never    Smokeless tobacco: Never   Substance and Sexual Activity    Alcohol use: Never    Drug use: Never    Sexual activity: Yes   Social History Narrative    ** Merged History Encounter **          Social Determinants of Health     Financial Resource Strain: Low Risk  (4/11/2024)    Overall Financial Resource Strain (CARDIA)     Difficulty of Paying Living Expenses: Not hard at all   Food Insecurity: No Food Insecurity (4/11/2024)    Hunger Vital Sign     Worried About Running Out of Food in the Last Year: Never true     Ran Out of Food in the Last Year: Never true   Transportation Needs: No Transportation Needs (4/11/2024)    PRAPARE - Transportation     Lack of Transportation (Medical): No     Lack of Transportation (Non-Medical): No   Physical Activity: Inactive (4/11/2024)    Exercise Vital Sign     Days of Exercise per Week: 0 days     Minutes of Exercise per Session: 0 min   Stress: No Stress Concern Present (4/11/2024)    [place] of [employer]ational Health - Occupational Stress Questionnaire     Feeling of Stress : Not at all   Housing Stability: Unknown (4/11/2024)    Housing Stability Vital Sign     Number of Places Lived in the Last Year: 1     Unstable Housing in the Last Year: No         Current Outpatient Medications:     acetaminophen (TYLENOL) 650 MG TbSR, Take 1 tablet (650 mg total) by mouth every 8  "(eight) hours as needed (pain)., Disp: 30 tablet, Rfl: 0    atorvastatin (LIPITOR) 10 MG tablet, Take 10 mg by mouth once daily., Disp: , Rfl:     doxazosin (CARDURA) 4 MG tablet, Take 4 mg by mouth once daily., Disp: , Rfl:     flecainide (TAMBOCOR) 50 MG Tab, Take 50 mg by mouth 2 (two) times a day., Disp: , Rfl:     gabapentin (NEURONTIN) 300 MG capsule, Take 1 capsule (300 mg total) by mouth every evening., Disp: 30 capsule, Rfl: 5    losartan (COZAAR) 50 MG tablet, Take 50 mg by mouth 2 (two) times a day., Disp: , Rfl:     metoprolol succinate (TOPROL-XL) 100 MG 24 hr tablet, 100 mg once daily., Disp: , Rfl:     vitamin D (VITAMIN D3) 1000 units Tab, Take 1,000 Units by mouth once daily., Disp: , Rfl:     levETIRAcetam (KEPPRA) 500 MG Tab, Take 1 tablet (500 mg total) by mouth 2 (two) times daily. for 6 days (Patient not taking: Reported on 4/22/2024), Disp: 12 tablet, Rfl: 0    rivaroxaban (XARELTO) 20 mg Tab, Take 20 mg by mouth daily with dinner or evening meal., Disp: , Rfl:   Review of patient's allergies indicates:  No Known Allergies    BP (!) 153/78   Pulse (!) 56   Ht 5' 5" (1.651 m)   Wt 83.5 kg (184 lb 1.4 oz)   BMI 30.63 kg/m²     Comprehensive review of systems completed and negative except as per HPI.        Objective:   Head: Normocephalic, without obvious abnormality, atraumatic  Eyes: conjunctivae/corneas clear. EOM's intact  Ears: normal external appearance  Nose: Nares normal. Septum midline. Mucosa normal. No drainage  Throat: normal findings: lips normal without lesions  Lungs: unlabored breathing on room air  Chest wall: symmetric chest rise  Heart: regular rate and rhythm  Pulses: 2+ and symmetric  Skin: Skin color, texture, turgor normal. No rashes or lesions  Neurologic: Grossly normal    bilateral KNEE:     Alignment: neutral  Appearance: skin in intact without lesion  Effusion: none  Gait: caitlyn  Ankle ROM: full and painless   Knee ROM:  0-125  Tenderness: anterior TTP    Patellar " Mobility: normal  Patellar grind: negative  J Sign: negative  PF Crepitus: negative        Vira Test: negative   Valgus Stress @ 0 deg: stable  Valgus Stress @ 30 deg: stable  Varus Stress @ 0 deg: stable  Varus Stress @ 30 deg: stable  Lachman: stable  Ant Drawer: negative   Post Drawer: negative  Posterior Sag Sign: negative  Neurological deficits: SILT dp/sp/t distributions  Motor: 5/5 EHL/FHL/TA/GS    Warm well perfused lower extremity with capillary refill less than 2 seconds and sensation intact to light touch in the terminal nerve distributions. Calf soft and easily compressible without clinical sign of DVT. No palpable popliteal lymphadenopathy    Assessment:     Imaging:   Results  Imaging  Recent X-rays of the knees showed no fractures with maintained joint spaces.         1. Acute pain of both knees          Plan:       Orders Placed This Encounter    Ambulatory referral/consult to Physical/Occupational Therapy        Assessment & Plan  1. Bilateral knee pain.  Imaging and exam findings discussed. Exacerbation of knee pain after falls. No XR abnormality. Exam consistent with contusions. The patient will be initiated on a regimen of home-based physical therapy. In the event of persistent pain, the patient is advised to contact the clinic for a knee injection. Avoid aggravating activities. Symptomatic management with RICE and OTC meds. F/u PRN.     This note was generated with the assistance of ambient listening technology. Verbal consent was obtained by the patient and accompanying visitor(s) for the recording of patient appointment to facilitate this note. I attest to having reviewed and edited the generated note for accuracy, though some syntax or spelling errors may persist. Please contact the author of this note for any clarification.

## 2024-05-23 ENCOUNTER — LAB VISIT (OUTPATIENT)
Dept: LAB | Facility: HOSPITAL | Age: 84
End: 2024-05-23
Attending: INTERNAL MEDICINE
Payer: MEDICARE

## 2024-05-23 DIAGNOSIS — I48.92 ATRIAL FLUTTER: Primary | ICD-10-CM

## 2024-05-23 PROCEDURE — 80181 DRUG ASSAY FLECAINIDE: CPT

## 2024-05-23 PROCEDURE — 36415 COLL VENOUS BLD VENIPUNCTURE: CPT

## 2024-05-27 PROBLEM — Z00.00 ANNUAL PHYSICAL EXAM: Status: RESOLVED | Noted: 2024-02-23 | Resolved: 2024-05-27

## 2024-05-27 LAB — MAYO GENERIC ORDERABLE RESULT: NORMAL

## 2024-05-30 ENCOUNTER — OFFICE VISIT (OUTPATIENT)
Dept: INTERNAL MEDICINE | Facility: CLINIC | Age: 84
End: 2024-05-30
Payer: MEDICARE

## 2024-05-30 VITALS
WEIGHT: 185 LBS | BODY MASS INDEX: 30.82 KG/M2 | DIASTOLIC BLOOD PRESSURE: 64 MMHG | SYSTOLIC BLOOD PRESSURE: 118 MMHG | OXYGEN SATURATION: 97 % | HEART RATE: 56 BPM | HEIGHT: 65 IN

## 2024-05-30 DIAGNOSIS — K11.20 PAROTITIS: Primary | ICD-10-CM

## 2024-05-30 PROCEDURE — 99214 OFFICE O/P EST MOD 30 MIN: CPT | Mod: ,,,

## 2024-05-30 RX ORDER — AMOXICILLIN AND CLAVULANATE POTASSIUM 875; 125 MG/1; MG/1
1 TABLET, FILM COATED ORAL EVERY 12 HOURS
Qty: 14 TABLET | Refills: 0 | Status: SHIPPED | OUTPATIENT
Start: 2024-05-30 | End: 2024-06-06

## 2024-05-30 NOTE — PROGRESS NOTES
Patient ID: Sumaya Moss is a 83 y.o. female.    Chief Complaint: Facial Swelling (Swollen gland to left side of face X 3 weeks.Painful to touch. Does not hurt to swallow or eat. No otalgia noted to ear. Denies any fever.)    Sumaya Moss is a 83 y.o. female, known to Dr Jackson, presents today for a requested visit.  Medical comorbidities include CAD, HTN, AFib (Xarelto), HLD.  Today presents with complaints of a left-sided lateral neck swelling, discomfort.  Per patient, symptom onset within the last 3 weeks.  Has gradually improved with time.  Does have localized discomfort still.  Denies recent infections, fevers, chills, or sinus congestion.  No prior history of salivary duct infection or stones.  Denies recent dental work or gum/dental infections Not currently treating with any OTC or prescription medications.  Avoiding NSAIDs due to history of Xarelto.  Otherwise stable for today's visit          MEDICAL HISTORY:    Past Medical History:   Diagnosis Date    Coronary artery disease     Fractures     Hypercholesterolemia     Hypertension     Respiratory distress       Past Surgical History:   Procedure Laterality Date    ABLATION      BRAIN SURGERY      aneurysm    COLONOSCOPY      CYSTOSCOPY W/ URETERAL STENT PLACEMENT Left 7/5/2023    Procedure: CYSTOSCOPY, WITH URETERAL STENT INSERTION;  Surgeon: Viet Barkley MD;  Location: [facility];  Service: Urology;  Laterality: Left;  LEFT    HYSTERECTOMY      INTRAMEDULLARY RODDING OF HUMERUS Left 10/26/2022    Procedure: INSERTION, INTRAMEDULLARY LEATHA, HUMERUS;  Surgeon: Doc Scherer DO;  Location: [facility] OR;  Service: Orthopedics;  Laterality: Left;      Social History     Tobacco Use    Smoking status: Never     Passive exposure: Never    Smokeless tobacco: Never   Substance Use Topics    Alcohol use: Never    Drug use: Never          Health Maintenance Due   Topic Date Due    Shingles Vaccine (1 of 2) Never done    RSV Vaccine (Age 60+ and  "Pregnant patients) (1 - 1-dose 60+ series) Never done    Pneumococcal Vaccines (Age 65+) (2 of 2 - PCV) 12/31/2020    COVID-19 Vaccine (5 - 2023-24 season) 02/17/2024          Patient Care Team:  Benedict Jackson II, MD as PCP - General (Internal Medicine)      Review of Systems   Constitutional:  Negative for fatigue and fever.   HENT:  Negative for congestion, rhinorrhea, sore throat and trouble swallowing.    Eyes:  Negative for redness and visual disturbance.   Respiratory:  Negative for cough, chest tightness and shortness of breath.    Cardiovascular:  Negative for chest pain and palpitations.   Gastrointestinal:  Negative for abdominal pain, constipation, diarrhea, nausea and vomiting.   Genitourinary:  Negative for dysuria, flank pain, frequency and urgency.   Musculoskeletal:  Negative for arthralgias, gait problem and myalgias.   Skin:  Negative for rash and wound.        Left-sided neck lump   Neurological:  Negative for facial asymmetry, speech difficulty, weakness and headaches.   All other systems reviewed and are negative.      Objective:   /64   Pulse (!) 56   Ht 5' 5" (1.651 m)   Wt 83.9 kg (185 lb)   SpO2 97%   BMI 30.79 kg/m²      Physical Exam  Constitutional:       General: She is not in acute distress.     Appearance: Normal appearance.   HENT:      Right Ear: Tympanic membrane, ear canal and external ear normal.      Left Ear: Tympanic membrane, ear canal and external ear normal.      Nose: Nose normal.      Mouth/Throat:      Mouth: Mucous membranes are moist.      Pharynx: Oropharynx is clear.   Eyes:      Extraocular Movements: Extraocular movements intact.      Conjunctiva/sclera: Conjunctivae normal.      Pupils: Pupils are equal, round, and reactive to light.   Neck:     Cardiovascular:      Rate and Rhythm: Normal rate and regular rhythm.      Pulses: Normal pulses.      Heart sounds: Normal heart sounds. No murmur heard.     No gallop.   Pulmonary:      Effort: Pulmonary " effort is normal.      Breath sounds: Normal breath sounds. No wheezing.   Abdominal:      General: Bowel sounds are normal. There is no distension.      Palpations: Abdomen is soft. There is no mass.      Tenderness: There is no abdominal tenderness. There is no guarding.   Musculoskeletal:         General: Normal range of motion.      Cervical back: Edema and erythema present.   Skin:     General: Skin is warm and dry.   Neurological:      Mental Status: She is alert. Mental status is at baseline.      Sensory: No sensory deficit.      Motor: No weakness.           Assessment:       ICD-10-CM ICD-9-CM   1. Parotitis  K11.20 527.2        Plan:     Problem List Items Addressed This Visit          ENT    Parotitis - Primary     -initiate trial on Augmentin   -please notify clinic if no improvement within the next 24 hours         Relevant Medications    amoxicillin-clavulanate 875-125mg (AUGMENTIN) 875-125 mg per tablet          Follow up for Present to ER/Urgent Care if symtoms worsen.   -plan specifics discussed above    Orders Placed This Encounter    amoxicillin-clavulanate 875-125mg (AUGMENTIN) 875-125 mg per tablet        Medication List with Changes/Refills   New Medications    AMOXICILLIN-CLAVULANATE 875-125MG (AUGMENTIN) 875-125 MG PER TABLET    Take 1 tablet by mouth every 12 (twelve) hours. for 7 days   Current Medications    ACETAMINOPHEN (TYLENOL) 650 MG TBSR    Take 1 tablet (650 mg total) by mouth every 8 (eight) hours as needed (pain).    ATORVASTATIN (LIPITOR) 10 MG TABLET    Take 10 mg by mouth once daily.    DOXAZOSIN (CARDURA) 4 MG TABLET    Take 4 mg by mouth once daily.    FLECAINIDE (TAMBOCOR) 50 MG TAB    Take 50 mg by mouth 2 (two) times a day.    LOSARTAN (COZAAR) 50 MG TABLET    Take 50 mg by mouth 2 (two) times a day.    METOPROLOL SUCCINATE (TOPROL-XL) 100 MG 24 HR TABLET    100 mg once daily.    RIVAROXABAN (XARELTO) 20 MG TAB    Take 20 mg by mouth daily with dinner or evening meal.     VITAMIN D (VITAMIN D3) 1000 UNITS TAB    Take 1,000 Units by mouth once daily.   Discontinued Medications    GABAPENTIN (NEURONTIN) 300 MG CAPSULE    Take 1 capsule (300 mg total) by mouth every evening.    LEVETIRACETAM (KEPPRA) 500 MG TAB    Take 1 tablet (500 mg total) by mouth 2 (two) times daily. for 6 days

## 2024-06-03 ENCOUNTER — TELEPHONE (OUTPATIENT)
Dept: INTERNAL MEDICINE | Facility: CLINIC | Age: 84
End: 2024-06-03
Payer: MEDICARE

## 2024-06-03 DIAGNOSIS — R22.1 LOCALIZED SWELLING, MASS AND LUMP, NECK: Primary | ICD-10-CM

## 2024-06-03 NOTE — TELEPHONE ENCOUNTER
Noted patient is still with neck discomfort from previously evaluated neck lump/.  Treated for with Augmentin without improvement.  Placing orders for CT neck        ICD-10-CM ICD-9-CM   1. Localized swelling, mass and lump, neck  R22.1 784.2     Orders Placed This Encounter    CT Soft Tissue Neck WO Contrast

## 2024-06-03 NOTE — TELEPHONE ENCOUNTER
----- Message from Jairo Hdoge sent at 6/3/2024 10:57 AM CDT -----  .Type:  Needs Medical Advice    Who Called: Pt's    Symptoms (please be specific):    How long has patient had these symptoms:    Pharmacy name and phone #:    Would the patient rather a call back or a response via [facility]?   Best Call Back Number: [phone]  Additional Information: Returning a call that they missed from office. No one in the office knew who called them.

## 2024-06-03 NOTE — TELEPHONE ENCOUNTER
Notified pt's  that Janice Liriano LPN attempted to contact pt to let them know a CT was ordered at [facility], voiced understanding

## 2024-06-03 NOTE — TELEPHONE ENCOUNTER
----- Message from Yisel Cash sent at 6/3/2024  8:24 AM CDT -----  .Who Called: Sumaya Moss    Caller is requesting assistance/information from provider's office.    Symptoms (please be specific): NECK PAIN   How long has patient had these symptoms:  LAST WEEK  List of preferred pharmacies on file (remove unneeded): [unfilled]  If different, enter pharmacy into here including location and phone number:       Preferred Method of Contact: Phone Call  Patient's Preferred Phone Number on File: [phone]   Best Call Back Number, if different:[phone]  Additional Information: NO CHANGE IN HER PAIN WAS TOLD BY NP TO CALL BACK

## 2024-06-03 NOTE — TELEPHONE ENCOUNTER
Please notify patient orders were placed for CT of her neck at [facility] imaging.  To further evaluate,

## 2024-06-06 ENCOUNTER — TELEPHONE (OUTPATIENT)
Dept: INTERNAL MEDICINE | Facility: CLINIC | Age: 84
End: 2024-06-06
Payer: MEDICARE

## 2024-06-06 DIAGNOSIS — K11.20 PAROTITIS: Primary | ICD-10-CM

## 2024-06-06 DIAGNOSIS — R22.1 MASS OF LEFT SIDE OF NECK: ICD-10-CM

## 2024-06-06 NOTE — TELEPHONE ENCOUNTER
Results sent to me from MILLICENT Heaton NP to discuss with Dr. Jackson. Collab with Dr. Jackson, who rec referral to ENT. Advised patient of results and referral placed to ENT. She reports area of concern is sore. Antibiotics did not help.  Advised to follow up as needed.

## 2024-06-10 ENCOUNTER — TELEPHONE (OUTPATIENT)
Dept: INTERNAL MEDICINE | Facility: CLINIC | Age: 84
End: 2024-06-10
Payer: MEDICARE

## 2024-06-10 DIAGNOSIS — K11.20 PAROTITIS: Primary | ICD-10-CM

## 2024-06-10 NOTE — TELEPHONE ENCOUNTER
----- Message from Rashmi Paige sent at 6/10/2024  9:17 AM CDT -----  Regarding: advice  Who Called: No patient name on file.    Caller is requesting assistance/information from provider's office.    Best Call Back Number, if different: [phone]  Additional Information: cristian from [facility] stated that pt was suppose to have and US ordered along with the referral for an ENT. Requested a call back. Please advise

## 2024-06-10 NOTE — TELEPHONE ENCOUNTER
No US Orders in system   CT Soft Tissue Neck done 06/03/24      Referral Faxed, Confirmation was not yet received--Refaxed

## 2024-06-17 ENCOUNTER — TELEPHONE (OUTPATIENT)
Dept: INTERNAL MEDICINE | Facility: CLINIC | Age: 84
End: 2024-06-17
Payer: MEDICARE

## 2024-06-17 DIAGNOSIS — I70.0 AORTIC ATHEROSCLEROSIS: Primary | ICD-10-CM

## 2024-06-17 NOTE — TELEPHONE ENCOUNTER
----- Message from Leanna Boyle sent at 6/17/2024  1:40 PM CDT -----  Regarding: refill    Who Called: Alexa with [facility]    Refill or New Rx:Refill    RX Name and Strength:atorvastatin (LIPITOR) 10 MG tablet    How is the patient currently taking it? (ex. 1XDay):1x day    Is this a 30 day or 90 day RX:90 day    Local or Mail Order:local    List of preferred pharmacies on file (remove unneeded):   [address]   Phone: [phone]  Fax: [phone]    Ordering Provider:    Preferred Method of Contact: Phone Call    Patient's Preferred Phone Number on File: [phone]     Best Call Back Number, if different:    Additional Information:  Alexa stated patients cardiologist stated Dr Jackson will have to start administering this medication.

## 2024-06-18 RX ORDER — ATORVASTATIN CALCIUM 10 MG/1
10 TABLET, FILM COATED ORAL DAILY
Qty: 90 TABLET | Refills: 3 | Status: SHIPPED | OUTPATIENT
Start: 2024-06-18

## 2024-06-19 ENCOUNTER — OFFICE VISIT (OUTPATIENT)
Dept: PAIN MEDICINE | Facility: CLINIC | Age: 84
End: 2024-06-19
Payer: MEDICARE

## 2024-06-19 VITALS
TEMPERATURE: 98 F | HEIGHT: 65 IN | WEIGHT: 185 LBS | SYSTOLIC BLOOD PRESSURE: 131 MMHG | DIASTOLIC BLOOD PRESSURE: 65 MMHG | HEART RATE: 53 BPM | BODY MASS INDEX: 30.82 KG/M2

## 2024-06-19 DIAGNOSIS — M51.36 DDD (DEGENERATIVE DISC DISEASE), LUMBAR: Primary | ICD-10-CM

## 2024-06-19 DIAGNOSIS — M54.16 LUMBAR RADICULOPATHY: ICD-10-CM

## 2024-06-19 PROCEDURE — 99213 OFFICE O/P EST LOW 20 MIN: CPT | Mod: ,,, | Performed by: NURSE PRACTITIONER

## 2024-06-19 NOTE — H&P (VIEW-ONLY)
[facility] Management Clinic  Pre-Operative Clinic Note      SUBJECTIVE    CHIEF COMPLAINT: Pre-op Exam (Pre op procedure 7/1/24 C/O pain level 7, not taking pain meds)       History of Present Illness: 83 y.o. female presents today for preoperative evaluation for bilateral transforaminal epidural steroid injection L4 on 07/01/2024. I reviewed the indications for procedure. The risks and benefits of the proposed and alternative treatments were discussed with the patient. Questions pertinent to the procedure were solicited and answered. No assurances were given. Informed consent was obtained. The patient expressed good understanding and wished to proceed with scheduling the procedure.     Review of Systems:   Constitutional: No fever, weakness, or fatigue.   Ear/Nose/Mouth/Throat: No nasal congestion or sore throat.   Respiratory: No shortness of breath or cough.   Cardiovascular: No chest pain, palpitations, or peripheral edema.   Gastrointestinal: No nausea, vomiting, or abdominal pain.   Genitourinary: No dysuria.  Musculoskeletal:  Patient complains of with neck pain and low back pain that has been present for years. She had a fall shortly before Easter this year, and her back pain has been worse since then. However, she does note that it is starting to gradually improve now. She does still have excruciating back pain if she stands or walks for too long. She was prescribed some gabapentin, which has been helping somewhat. She also started doing home health physical therapy. She takes Tylenol most days for relief. She currently rates the pain as 7/10 on the NRS, which is average. Her pain had gotten up to 10/10 at worst right after she fell. She alternates ice packs and a heating pad for relief as well. She has not undergone any previous injections or surgery on her lumbar spine.     Past Surgical History:   Procedure Laterality Date    ABLATION      BRAIN SURGERY      aneurysm    COLONOSCOPY      CYSTOSCOPY W/  "URETERAL STENT PLACEMENT Left 7/5/2023    Procedure: CYSTOSCOPY, WITH URETERAL STENT INSERTION;  Surgeon: Viet Barkley MD;  Location: [facility] OR;  Service: Urology;  Laterality: Left;  LEFT    HYSTERECTOMY      INTRAMEDULLARY RODDING OF HUMERUS Left 10/26/2022    Procedure: INSERTION, INTRAMEDULLARY LEATHA, HUMERUS;  Surgeon: Doc Scherer DO;  Location: [facility] OR;  Service: Orthopedics;  Laterality: Left;        Past Medical History:   Diagnosis Date    Coronary artery disease     Fractures     Hypercholesterolemia     Hypertension     Respiratory distress         OBJECTIVE:    Visit Vitals  /65 (BP Location: Right arm, Patient Position: Sitting, BP Method: Medium (Automatic))   Pulse (!) 53   Temp 97.7 °F (36.5 °C)   Ht 5' 5" (1.651 m)   Wt 83.9 kg (185 lb)   BMI 30.79 kg/m²     Vitals:    06/19/24 0756   PainSc:   7       Physical Exam:   General: Well-developed, well-nourished.  Neuro: Alert and oriented x 3.  Psych: Normal mood and affect.  HEENT: Normocephalic. PERRLA EOM intact. Nose and throat clear.  Lungs: Clear to auscultation and percussion.  Heart: Regular rate and rhythm   Abdomen: Soft non-tender. Bowel sounds positive. No rebound tenderness.  Skin: No rashes or open wounds  Musculoskeletal: Antalgic gait.  Independent ambulator  + straight leg raise bilaterally.  Bilateral upper and lower extremity strength 5/5.    ASSESSMENT:  1. DDD (degenerative disc disease), lumbar    2. Lumbar radiculopathy       PLAN:  Plan for to proceed with bilateral transforaminal epidural steroid injection L4 on 07/01/2024..   [x] The patient has been given preoperative instructions and prescriptions for post-operative medication.   [] Medications to hold:   [x] Cardiac clearance received and reviewed.  Cardiologist approved holding Xarelto for 3 days and resume postop.  [x] Post-operative appointment is scheduled for 2 weeks.    "

## 2024-06-28 ENCOUNTER — PATIENT MESSAGE (OUTPATIENT)
Dept: ADMINISTRATIVE | Facility: OTHER | Age: 84
End: 2024-06-28
Payer: MEDICARE

## 2024-06-30 ENCOUNTER — PATIENT MESSAGE (OUTPATIENT)
Dept: ADMINISTRATIVE | Facility: OTHER | Age: 84
End: 2024-06-30
Payer: MEDICARE

## 2024-06-30 ENCOUNTER — ANESTHESIA EVENT (OUTPATIENT)
Dept: SURGERY | Facility: HOSPITAL | Age: 84
End: 2024-06-30
Payer: MEDICARE

## 2024-06-30 RX ORDER — SODIUM CHLORIDE, SODIUM GLUCONATE, SODIUM ACETATE, POTASSIUM CHLORIDE AND MAGNESIUM CHLORIDE 30; 37; 368; 526; 502 MG/100ML; MG/100ML; MG/100ML; MG/100ML; MG/100ML
INJECTION, SOLUTION INTRAVENOUS CONTINUOUS
OUTPATIENT
Start: 2024-06-30 | End: 2024-07-30

## 2024-06-30 RX ORDER — LIDOCAINE HYDROCHLORIDE 10 MG/ML
1 INJECTION, SOLUTION EPIDURAL; INFILTRATION; INTRACAUDAL; PERINEURAL ONCE
OUTPATIENT
Start: 2024-06-30 | End: 2024-06-30

## 2024-07-01 ENCOUNTER — HOSPITAL ENCOUNTER (OUTPATIENT)
Facility: HOSPITAL | Age: 84
Discharge: HOME | End: 2024-07-01
Attending: ANESTHESIOLOGY | Admitting: ANESTHESIOLOGY
Payer: MEDICARE

## 2024-07-01 ENCOUNTER — ANESTHESIA (OUTPATIENT)
Dept: SURGERY | Facility: HOSPITAL | Age: 84
End: 2024-07-01
Payer: MEDICARE

## 2024-07-01 DIAGNOSIS — M54.9 CHRONIC BACK PAIN GREATER THAN 3 MONTHS DURATION: Primary | ICD-10-CM

## 2024-07-01 DIAGNOSIS — G89.29 CHRONIC BACK PAIN GREATER THAN 3 MONTHS DURATION: Primary | ICD-10-CM

## 2024-07-01 PROCEDURE — 63600175 PHARM REV CODE 636 W HCPCS: Performed by: NURSE ANESTHETIST, CERTIFIED REGISTERED

## 2024-07-01 PROCEDURE — 64483 NJX AA&/STRD TFRM EPI L/S 1: CPT | Mod: 50,,, | Performed by: ANESTHESIOLOGY

## 2024-07-01 PROCEDURE — 64483 NJX AA&/STRD TFRM EPI L/S 1: CPT | Mod: 50 | Performed by: ANESTHESIOLOGY

## 2024-07-01 PROCEDURE — 25000003 PHARM REV CODE 250: Performed by: ANESTHESIOLOGY

## 2024-07-01 PROCEDURE — 25000003 PHARM REV CODE 250: Performed by: NURSE ANESTHETIST, CERTIFIED REGISTERED

## 2024-07-01 PROCEDURE — 37000008 HC ANESTHESIA 1ST 15 MINUTES: Performed by: ANESTHESIOLOGY

## 2024-07-01 PROCEDURE — 63600175 PHARM REV CODE 636 W HCPCS: Performed by: ANESTHESIOLOGY

## 2024-07-01 RX ORDER — DEXAMETHASONE SODIUM PHOSPHATE 10 MG/ML
INJECTION INTRAMUSCULAR; INTRAVENOUS
Status: DISCONTINUED
Start: 2024-07-01 | End: 2024-07-01 | Stop reason: HOSPADM

## 2024-07-01 RX ORDER — LIDOCAINE HYDROCHLORIDE 10 MG/ML
INJECTION, SOLUTION EPIDURAL; INFILTRATION; INTRACAUDAL; PERINEURAL
Status: DISCONTINUED | OUTPATIENT
Start: 2024-07-01 | End: 2024-07-01

## 2024-07-01 RX ORDER — BUPIVACAINE HYDROCHLORIDE 2.5 MG/ML
INJECTION, SOLUTION EPIDURAL; INFILTRATION; INTRACAUDAL
Status: DISCONTINUED | OUTPATIENT
Start: 2024-07-01 | End: 2024-07-01 | Stop reason: HOSPADM

## 2024-07-01 RX ORDER — PROPOFOL 10 MG/ML
VIAL (ML) INTRAVENOUS
Status: DISCONTINUED | OUTPATIENT
Start: 2024-07-01 | End: 2024-07-01

## 2024-07-01 RX ORDER — LIDOCAINE HYDROCHLORIDE 10 MG/ML
INJECTION, SOLUTION EPIDURAL; INFILTRATION; INTRACAUDAL; PERINEURAL
Status: DISCONTINUED | OUTPATIENT
Start: 2024-07-01 | End: 2024-07-01 | Stop reason: HOSPADM

## 2024-07-01 RX ORDER — DEXAMETHASONE SODIUM PHOSPHATE 10 MG/ML
INJECTION INTRAMUSCULAR; INTRAVENOUS
Status: DISCONTINUED | OUTPATIENT
Start: 2024-07-01 | End: 2024-07-01 | Stop reason: HOSPADM

## 2024-07-01 RX ORDER — LIDOCAINE HYDROCHLORIDE 10 MG/ML
INJECTION, SOLUTION EPIDURAL; INFILTRATION; INTRACAUDAL; PERINEURAL
Status: DISCONTINUED
Start: 2024-07-01 | End: 2024-07-01 | Stop reason: HOSPADM

## 2024-07-01 RX ADMIN — PROPOFOL 30 MG: 10 INJECTION, EMULSION INTRAVENOUS at 08:07

## 2024-07-01 RX ADMIN — LIDOCAINE HYDROCHLORIDE 50 MG: 10 INJECTION, SOLUTION EPIDURAL; INFILTRATION; INTRACAUDAL; PERINEURAL at 08:07

## 2024-07-01 NOTE — TRANSFER OF CARE
"Anesthesia Transfer of Care Note    Patient: Sumaya Moss    Procedure(s) Performed: Procedure(s) (LRB):  INJECTION, STEROID, EPIDURAL, TRANSFORAMINAL APPROACH Bilateral L4 (Bilateral)    Patient location: OPS    Anesthesia Type: MAC    Transport from OR: Transported from OR on room air with adequate spontaneous ventilation    Post pain: adequate analgesia    Post assessment: no apparent anesthetic complications    Post vital signs: stable    Level of consciousness: awake    Nausea/Vomiting: no nausea/vomiting    Complications: none    Transfer of care protocol was followed      Last vitals: Visit Vitals  BP (!) 188/74   Pulse 68   Temp 36.7 °C (98.1 °F) (Oral)   Resp 18   Ht 5' 5" (1.651 m)   Wt 84.8 kg (186 lb 15.2 oz)   SpO2 95%   Breastfeeding No   BMI 31.11 kg/m²     "

## 2024-07-01 NOTE — ANESTHESIA PREPROCEDURE EVALUATION
06/30/2024  Sumaya Moss is a 83 y.o., female.  Diagnosis:      DDD (degenerative disc disease), lumbar [M51.36]      DDD (degenerative disc disease), cervical [M50.30]      Chronic back pain greater than 3 months duration [M54.9, G89.29]      Chronic neck pain [M54.2, G89.29]      Intervertebral disc disorders with radiculopathy, lumbar region [M51.16]         The pt. Comes to [facility] for the noted pain management procedure under IV Sedation / TIVA w/ Local.  Case: [id number] Date/Time: 07/01/24 0904   Procedure: INJECTION, STEROID, EPIDURAL, TRANSFORAMINAL APPROACH Bilateral L4 (Bilateral: Back) - Bilateral TFESI L4   Anesthesia type: Local MAC       PMHx:  Problem List  Current as of 06/30/24 2132  Aortic atherosclerosis Atrial fibrillation   Atrial flutter, unspecified type Cerebral aneurysm, nonruptured   Chronic back pain greater than 3 months duration Chronic neck pain   DDD (degenerative disc disease), cervical DDD (degenerative disc disease), lumbar   Fall Fracture of humeral shaft, right, closed   Intracranial hemorrhage Intracranial subarachnoid hemorrhage   Kidney stone Osteoarthritis   Parotitis Primary hypertension     Respiratory distress    Other Medical History   Fractures Hypercholesterolemia   Coronary artery disease Hypertension         PSHx:  Surgical History:  HYSTERECTOMY ABLATION   COLONOSCOPY BRAIN SURGERY   INTRAMEDULLARY RODDING OF HUMERUS CYSTOSCOPY W/ URETERAL STENT PLACEMENT           Vital signs:        Lab Data:          EKG:      Pre-op Assessment    I have reviewed the Patient Summary Reports.     I have reviewed the Nursing Notes. I have reviewed the NPO Status.   I have reviewed the Medications.     Review of Systems  Anesthesia Hx:  No problems with previous Anesthesia                Social:  Non-Smoker       Hematology/Oncology:  Hematology Normal   Oncology Normal                                    EENT/Dental:  EENT/Dental Normal           Cardiovascular:  Exercise tolerance: good   Hypertension   CAD                Functional Capacity good / => 4 METS                         Pulmonary:  Pulmonary Normal                       Renal/:  Chronic Renal Disease                Hepatic/GI:  Hepatic/GI Normal                 Musculoskeletal:  Arthritis               Neurological:  Neurology Normal                                      Endocrine:  Endocrine Normal            Dermatological:  Skin Normal    Psych:  Psychiatric Normal                    Physical Exam  General: Alert, Oriented, Well nourished and Cooperative    Airway:  Mallampati: II   Mouth Opening: Normal  TM Distance: Normal  Tongue: Normal  Neck ROM: Normal ROM    Dental:  Intact    Chest/Lungs:  Clear to auscultation, Normal Respiratory Rate    Heart:  Rate: Normal  Rhythm: Regular Rhythm        Anesthesia Plan  Type of Anesthesia, risks & benefits discussed:    Anesthesia Type: MAC, Gen Natural Airway  Intra-op Monitoring Plan: Standard ASA Monitors  Post Op Pain Control Plan: IV/PO Opioids PRN and multimodal analgesia  Induction:  IV  ASA Score: 2  Day of Surgery Review of History & Physical: H&P Update referred to the surgeon/provider.  Anesthesia Plan Notes: IV Sedation with IV Propofol    Ready For Surgery From Anesthesia Perspective.     .

## 2024-07-01 NOTE — PLAN OF CARE
Patient resting quietly in bed.  Denies any complaints.  Vital signs stable and no signs of bleeding.  Discharge instructions given and patient verbalizes understanding.  Patient meets criteria for discharge, assisted into wheelchair and brought to family car.

## 2024-07-01 NOTE — DISCHARGE SUMMARY
[facility] Surgical - Periop Services  Discharge Note  Short Stay    Procedure(s) (LRB):  INJECTION, STEROID, EPIDURAL, TRANSFORAMINAL APPROACH Bilateral L4 (Bilateral)      OUTCOME: Patient tolerated treatment/procedure well without complication and is now ready for discharge.    DISPOSITION: Home or Self Care    FINAL DIAGNOSIS:  <principal problem not specified>    FOLLOWUP: In clinic    DISCHARGE INSTRUCTIONS:  No discharge procedures on file.     TIME SPENT ON DISCHARGE: 5 minutes

## 2024-07-01 NOTE — ANESTHESIA POSTPROCEDURE EVALUATION
Anesthesia Post Evaluation    Patient: Sumaya Moss    Procedure(s) Performed: Procedure(s) (LRB):  INJECTION, STEROID, EPIDURAL, TRANSFORAMINAL APPROACH Bilateral L4 (Bilateral)    Final Anesthesia Type: general      Patient location during evaluation: OPS  Patient participation: Yes- Able to Participate  Level of consciousness: awake and alert  Post-procedure vital signs: reviewed and stable  Pain management: adequate  Airway patency: patent    PONV status at discharge: No PONV  Anesthetic complications: no      Cardiovascular status: blood pressure returned to baseline  Respiratory status: unassisted  Hydration status: euvolemic  Follow-up not needed.              Vitals Value Taken Time   /74 07/01/24 0727   Temp 36.7 °C (98.1 °F) 07/01/24 0727   Pulse 68 07/01/24 0727   Resp 18 07/01/24 0739   SpO2 95 % 07/01/24 0727         No case tracking events are documented in the log.      Pain/Seth Score: No data recorded

## 2024-07-01 NOTE — PLAN OF CARE
Problem: Pain Acute  Goal: Optimal Pain Control and Function  Outcome: Progressing     Problem: Fall Injury Risk  Goal: Absence of Fall and Fall-Related Injury  Outcome: Progressing     Problem: Infection  Goal: Absence of Infection Signs and Symptoms  Outcome: Progressing     Problem: Comorbidity Management  Goal: Blood Pressure in Desired Range  Outcome: Progressing     Problem: Adult Inpatient Plan of Care  Goal: Plan of Care Review  Outcome: Met  Goal: Patient-Specific Goal (Individualized)  Outcome: Met  Goal: Absence of Hospital-Acquired Illness or Injury  Outcome: Met  Goal: Optimal Comfort and Wellbeing  Outcome: Met  Goal: Readiness for Transition of Care  Outcome: Met     Problem: Wound  Goal: Optimal Coping  Outcome: Met  Goal: Optimal Functional Ability  Outcome: Met  Goal: Absence of Infection Signs and Symptoms  Outcome: Progressing  Goal: Improved Oral Intake  Outcome: Met  Goal: Optimal Pain Control and Function  Outcome: Met  Goal: Skin Health and Integrity  Outcome: Met  Goal: Optimal Wound Healing  Outcome: Met

## 2024-07-01 NOTE — OP NOTE
Procedure:    Left L4  transforaminal epidural steroid injection    Right L4  transforaminal epidural steroid injection    Pre-Procedure Diagnoses:  Chronic back pain greater than 3 months  Lumbar degenerative disc disease  Lumbar radiculopathy  Lumbar disc displacement    Post-Procedure Diagnoses:  Chronic back pain greater than 3 months  Lumbar degenerative disc disease  Lumbar radiculopathy  Lumbar disc displacement    Anesthesia:  Local and MAC    Estimated Blood Loss:  < 2 ML    Consent:  The procedure, risks, benefits, and alternatives were discussed with the patient.  The patient voiced understanding and fully informed written consent was obtained.    Description of the Procedure:  The patient was taken to the operating room and placed in the prone position. The skin overlying the lumbar spine was prepped with Chloraprep and draped in the usual sterile fashion.  An oblique fluoroscopic view was obtained on the left side at L4, with the superior articular process of the inferior vertebral body aligned with the pedicle.  Skin anesthesia was achieved using 2 mL of lidocaine 1%.  A 22-gauge 5 -inch Quinke spinal needle was inserted and advanced under intermittent fluoroscopic views into the epidural space. Proper needle position was confirmed under AP, oblique, and lateral fluoroscopic views.  Negative aspiration for blood or CSF was confirmed. 1 mL of contrast was injected, which revealed spread into the epidural space.  Then a combination of 5 mg of dexamethasone with 1 mL of 0.25% bupivacaine was easily injected.   There was no pain on injection. The needle was removed intact and bleeding was nil.  The same procedure was repeated in identical fashion on the right side at L4.  Sterile bandages were applied. The patient was taken to the recovery room for further observation in stable condition. The patient was then discharged home with no complications.

## 2024-07-01 NOTE — PLAN OF CARE
Patient complained of severe pain to anterior thighs when trying to sit up and dress.  Dr. Ventura notified and here to see patient.  Dr. Ventura reassured patient that this is normal after this procedure.

## 2024-07-02 VITALS
DIASTOLIC BLOOD PRESSURE: 83 MMHG | RESPIRATION RATE: 18 BRPM | BODY MASS INDEX: 31.14 KG/M2 | SYSTOLIC BLOOD PRESSURE: 159 MMHG | HEART RATE: 55 BPM | HEIGHT: 65 IN | OXYGEN SATURATION: 94 % | WEIGHT: 186.94 LBS | TEMPERATURE: 98 F

## 2024-07-11 ENCOUNTER — TELEPHONE (OUTPATIENT)
Dept: PAIN MEDICINE | Facility: CLINIC | Age: 84
End: 2024-07-11
Payer: MEDICARE

## 2024-07-11 NOTE — TELEPHONE ENCOUNTER
Pt's  nurse Alexa contacted office stating her pain is 8/10 at this point the injection has not provided any relief, pt would like something to get relief, contacted pt to verify pharmacy informed pt you do not prescribe narcotics and that is may take a few weeks to feel any result from injection, correct pharmacy is [facility] - [address], please advise.

## 2024-07-16 ENCOUNTER — OFFICE VISIT (OUTPATIENT)
Dept: PAIN MEDICINE | Facility: CLINIC | Age: 84
End: 2024-07-16
Payer: MEDICARE

## 2024-07-16 VITALS
SYSTOLIC BLOOD PRESSURE: 129 MMHG | WEIGHT: 186 LBS | DIASTOLIC BLOOD PRESSURE: 65 MMHG | BODY MASS INDEX: 30.99 KG/M2 | HEIGHT: 65 IN | HEART RATE: 51 BPM

## 2024-07-16 DIAGNOSIS — M54.16 LUMBAR RADICULOPATHY: ICD-10-CM

## 2024-07-16 DIAGNOSIS — M51.36 DDD (DEGENERATIVE DISC DISEASE), LUMBAR: Primary | ICD-10-CM

## 2024-07-16 DIAGNOSIS — M47.816 LUMBAR FACET ARTHROPATHY: ICD-10-CM

## 2024-07-16 DIAGNOSIS — M48.061 LUMBAR FORAMINAL STENOSIS: ICD-10-CM

## 2024-07-16 PROCEDURE — 99214 OFFICE O/P EST MOD 30 MIN: CPT | Mod: ,,, | Performed by: NURSE PRACTITIONER

## 2024-07-16 RX ORDER — DICLOFENAC SODIUM 10 MG/G
4 GEL TOPICAL 4 TIMES DAILY
Qty: 450 G | Refills: 2 | Status: SHIPPED | OUTPATIENT
Start: 2024-07-16 | End: 2024-10-14

## 2024-07-16 NOTE — PROGRESS NOTES
[facility]    Subjective:     Chief Complaint: Follow-up (F/u procedure 7/1/24 C/O pain level 10, states procedure didn't help, states pain is the same. Taking Tylenol PRN.)    Referred by: No ref. provider found     History of Present Illness: Sumaya Moss is a 83 y.o. female presents today as a postoperative follow-up for pain after receiving a bilateral L4 transforaminal epidural steroid injection on 0 7/01/2024 is accompanied by her .  Unfortunately the epidural steroid was ineffective.  She reports her primary pain is located to the lumbar axial spine that is worse with prolonged standing, household chores especially cooking exacerbates her back pain.  These activities will elevate her pain score to a 10/10.  She does not have pain radiating to the legs.  Review of her MRI lumbar spine shows several levels of moderate-to-severe facet arthropathy that is pronounced at L2-L3 L4 and L4-L5.   Additionally she has moderate facet arthropathy at L3-L4.  She does not have a pacemaker, spinal cord stimulator or defibrillator.  She is interested in conservative treatments for arthritis in her lumbar axial spine 1st.  She has not tried Voltaren gel in his interested in trying this 1st.  Patient was originally seen as a new consult by Dr. Ventura on 05/16/2024 per her primary care physician with complaints of low back and neck pain.  (original consult HPI)  This is an 83-year-old female who presents to clinic today for her initial consultation as a referral from her primary care physician. She complains of with neck pain and low back pain that has been present for years. She had a fall shortly before Easter this year, and her back pain has been worse since then. However, she does note that it is starting to gradually improve now. She does still have excruciating back pain if she stands or walks for too long. She was prescribed some gabapentin, which has been helping somewhat. She also started  "doing home health physical therapy. She takes Tylenol most days for relief. She currently rates the pain as 7/10 on the NRS, which is average. Her pain had gotten up to 10/10 at worst right after she fell. She alternates ice packs and a heating pad for relief as well. She has not undergone any previous sugeries.    Vital signs:   Visit Vitals  /65 (BP Location: Right arm, Patient Position: Sitting, BP Method: Large (Automatic))   Pulse (!) 51   Ht 5' 5" (1.651 m)   Wt 84.4 kg (186 lb)   BMI 30.95 kg/m²      Vitals:    07/16/24 0856   PainSc: 10-Worst pain ever             Interventional Pain History  07/01/2024: Bilateral L4 TFESI    ROS back pain    CT lumbar spine 2024:   FINDINGS:  There are 5 non-rib-bearing lumbar type vertebral bodies.  There is rightward convexity lumbar scoliosis with apex at L3 and with Nichole angle of 25 degrees utilizing the superior endplate of L2 and inferior endplate of L4.  There is 1-2 mm degenerative retrolisthesis of T12 along with slight left lateral degenerative listhesis of L1 and L2 and right lateral degenerative listhesis of L4.  No pars interarticularis fracture.  The T12 and lumbar vertebral bodies demonstrate no acute or chronic fracture.  There is a chronic Schmorl's node at the superior endplate of L2 with no significant osseous lesion.  There is severe diffuse degenerative disc space narrowing and intervertebral vacuum phenomenon from T11-T12 through L5-S1.  No paravertebral soft tissue abnormality is identified.  There is mild to moderate scattered atherosclerotic calcification.  Additional changes of the spine on a level by level basis and within the limitations of noncontrast CT technique are as follows:     T11-T12: Shallow disc bulge without protrusion.  No spinal canal or neural foramen stenosis.     T12-L1: The slight degenerative retrolisthesis and shallow disc bulging slightly impinges upon the ventral thecal sac with no spinal canal, lateral recess, or " neural foramen stenosis.     L1-L2: Mild bilateral degenerative facet arthrosis and shallow circumferential spondylosis.  No spinal canal, lateral recess, or neural foramen stenosis.     L2-L3: No disc bulge or protrusion.  Moderate to severe left more advanced than right degenerative facet arthrosis and associated hypertrophy.  Mild narrowing of the left lateral recess without spinal canal stenosis.  There is mild left neural foramen stenosis.     L3-L4: Shallow circumferential spondylosis without disc protrusion identified.  Moderate to severe bilateral degenerative facet arthrosis and hypertrophy which is more advanced on the left.  Mild spinal canal narrowing without lateral recess stenosis.  There is mild left neural foramen stenosis.     L4-L5: Shallow circumferential disc bulging and severe bilateral degenerative facet arthrosis hypertrophy, more advanced on the left.  Mild spinal canal narrowing and mild bilateral neural foramen stenosis.     L5-S1: Severe bilateral degenerative facet arthrosis and hypertrophy with no spinal canal or lateral recess stenosis.  Moderate right and no left neural foramen stenosis.  The sacrum and sacroiliac joints are normal.     Impression:     Advanced chronic diffuse lower thoracic and lumbar degenerative disc disease and facet arthrosis with moderate rightward convexity lumbar scoliosis and mild multilevel degenerative spondylolisthesis.  No fracture.  No significant spinal canal stenosis although there is mild narrowing of the left lateral recess at L2-L3.  Neural foramen stenosis is most advanced and moderate on the right at L5-S1.  Additional mild bilateral neural foramen stenosis at L4-L5 and mild left neural foramen stenosis L3-L4 and L2-L3.     No acute pathology identified.        Electronically signed by:Tyler Alvaro  Date:                                            04/16/2024    Objective:        Physical Exam  General: Well developed; overweight; A&O x 3; No  anxiety/depression; NAD  Mental Status: Oriented to person, palce and time. Displays appropriate mood & affect.  Head: Norm cephalic and atraumatic  Neck:  No cervical paraspinal banding.  Full range of motion with lateral turning and cervical flexion +extension.  Eyes: normal conjunctiva, normal lids, normal pupils  ENT and mouth: normal external ear, nose, and no lesions noted on the lips.  Respiratory: Symmetrical, Unlabored. No dyspnea  CV: normal rhythm and rate. No peripheral edema.   Abdomen: Non-distended    Extremities:  Gen: No cyanosis or tenderness to palpation bilateral upper and lower extremities  Skin: Warm, pink, dry, no rashes, no lesions on the lumbar spine  Strength: 5/5 motor strength bilateral upper and lower extremities  ROM: Full ROM in bilateral knees and ankles without pain or instability.    Neuro:  Gait: no altalgic lean, normal toe and heel raise. Independent ambulator.  DTR's: 2+ in bilateral patellar, and ankle  Sensory: Intact to light touch bilateral  upper and lower extremities    Spine: Normal lordosis. No scoliosis  L-spine ROM:  Limited and painful ROM to flexion, extension, bilateral rotation,   Straight Leg Raise:  Negative bilaterally  SI Joint: No tenderness to palpation bilaterally.      Assessment:     Sumaya Moss is a 83 y.o. female presents today as a postoperative follow-up for pain after receiving a bilateral L4 transforaminal epidural steroid injection on 0 7/01/2024 is accompanied by her .  Unfortunately the epidural steroid was ineffective.  She reports her primary pain is located to the lumbar axial spine that is worse with prolonged standing, household chores especially cooking exacerbates her back pain.  These activities will elevate her pain score to a 10/10.  She does not have pain radiating to the legs.  Review of her MRI lumbar spine shows several levels of moderate-to-severe facet arthropathy that is pronounced at L2-L3 L4 and L4-L5.    Additionally she has moderate facet arthropathy at L3-L4.  She does not have a pacemaker, spinal cord stimulator or defibrillator.  She is interested in conservative treatments for arthritis in her lumbar axial spine 1st.  She has not tried Voltaren gel in his interested in trying this 1st.    Plan of care:   Voltaren gel Rx apply 4 times a day   (she has not taking any other nonsteroidal anti-inflammatory medications)  Recommended diagnostic MBBs. Patient will let me know  if she would like to proceed   Patient to follow up in 4 months for lumbar facet arthropathy  Encounter Diagnoses   Name Primary?    DDD (degenerative disc disease), lumbar Yes    Lumbar radiculopathy     Lumbar facet arthropathy     Lumbar foraminal stenosis          Plan:       Sumaya was seen today for follow-up.    Diagnoses and all orders for this visit:    DDD (degenerative disc disease), lumbar    Lumbar radiculopathy    Lumbar facet arthropathy    Lumbar foraminal stenosis               Past Medical History:   Diagnosis Date    Coronary artery disease     Fractures     Hypercholesterolemia     Hypertension     Respiratory distress        Past Surgical History:   Procedure Laterality Date    ABLATION      BRAIN SURGERY      aneurysm    COLONOSCOPY      CYSTOSCOPY W/ URETERAL STENT PLACEMENT Left 7/5/2023    Procedure: CYSTOSCOPY, WITH URETERAL STENT INSERTION;  Surgeon: Viet Barkley MD;  Location: [facility];  Service: Urology;  Laterality: Left;  LEFT    HYSTERECTOMY      INTRAMEDULLARY RODDING OF HUMERUS Left 10/26/2022    Procedure: INSERTION, INTRAMEDULLARY LEATHA, HUMERUS;  Surgeon: Doc Scherer DO;  Location: [facility];  Service: Orthopedics;  Laterality: Left;    TRANSFORAMINAL EPIDURAL INJECTION OF STEROID Bilateral 7/1/2024    Procedure: INJECTION, STEROID, EPIDURAL, TRANSFORAMINAL APPROACH Bilateral L4;  Surgeon: Melissa Ventura MD;  Location: [facility] OR;  Service: Pain Management;  Laterality: Bilateral;  Bilateral TFESI L4        Family History   Problem Relation Name Age of Onset    No Known Problems Mother      No Known Problems Father         Social History     Socioeconomic History    Marital status:    Tobacco Use    Smoking status: Never     Passive exposure: Never    Smokeless tobacco: Never   Substance and Sexual Activity    Alcohol use: Never    Drug use: Never    Sexual activity: Yes   Social History Narrative    ** Merged History Encounter **          Social Determinants of Health     Financial Resource Strain: Low Risk  (4/11/2024)    Overall Financial Resource Strain (CARDIA)     Difficulty of Paying Living Expenses: Not hard at all   Food Insecurity: No Food Insecurity (4/11/2024)    Hunger Vital Sign     Worried About Running Out of Food in the Last Year: Never true     Ran Out of Food in the Last Year: Never true   Transportation Needs: No Transportation Needs (4/11/2024)    PRAPARE - Transportation     Lack of Transportation (Medical): No     Lack of Transportation (Non-Medical): No   Physical Activity: Inactive (4/11/2024)    Exercise Vital Sign     Days of Exercise per Week: 0 days     Minutes of Exercise per Session: 0 min   Stress: No Stress Concern Present (4/11/2024)    [employer] - Occupational Stress Questionnaire     Feeling of Stress : Not at all   Housing Stability: Unknown (4/11/2024)    Housing Stability Vital Sign     Number of Places Lived in the Last Year: 1     Unstable Housing in the Last Year: No       Current Outpatient Medications   Medication Sig Dispense Refill    acetaminophen (TYLENOL) 650 MG TbSR Take 1 tablet (650 mg total) by mouth every 8 (eight) hours as needed (pain). 30 tablet 0    atorvastatin (LIPITOR) 10 MG tablet Take 1 tablet (10 mg total) by mouth once daily. 90 tablet 3    doxazosin (CARDURA) 4 MG tablet Take 4 mg by mouth once daily.      flecainide (TAMBOCOR) 50 MG Tab Take 50 mg by mouth 2 (two) times a day.      losartan (COZAAR) 50 MG  tablet Take 50 mg by mouth 2 (two) times a day.      metoprolol succinate (TOPROL-XL) 100 MG 24 hr tablet 100 mg once daily.      rivaroxaban (XARELTO) 20 mg Tab Take 20 mg by mouth daily with dinner or evening meal.      vitamin D (VITAMIN D3) 1000 units Tab Take 1,000 Units by mouth once daily.       No current facility-administered medications for this visit.       Review of patient's allergies indicates:  No Known Allergies

## 2024-07-16 NOTE — PATIENT INSTRUCTIONS
I would consider  a medial branch block (MBB) to joint(s) in your spinal vertebra that contain peripheral nerves.  This MBB is a temporary anesthetic medication designed to block the burning/sharp pain caused from irritation to a spinal nerve root.   This anesthetic block will be injected in the joint where the nerve root is being irritated.   The anticipated pain relief from this MBB is temporary and designed to last up to a week.     Following your discharge, I would like you to test this block out by performing normal house hold chores or activities of daily living for a week  Pay attention if you have reduced pain and which activities were improved.   Insurance will typically expect an 80% or greater improvement with pain to approve a burning of the nerve, or Radiofrequency Ablation (RFA) to the same level. I have ordered a medial branch block (MBB) to joint(s) in your spinal vertebra that contain peripheral nerves.  This MBB is a temporary anesthetic medication designed to block the burning/sharp pain caused from irritation to a spinal nerve root.   This anesthetic block will be injected in the joint where the nerve root is being irritated.   The anticipated pain relief from this MBB is temporary and designed to last up to a week.     Following your discharge, I would like you to test this block out by performing normal house hold chores or activities of daily living for a week  Pay attention if you have reduced pain and which activities were improved.   Insurance will typically expect an 80% or greater improvement with pain to approve a burning of the nerve, or Radiofrequency Ablation (RFA) to the same level.

## 2024-09-03 ENCOUNTER — TELEPHONE (OUTPATIENT)
Dept: INTERNAL MEDICINE | Facility: CLINIC | Age: 84
End: 2024-09-03
Payer: MEDICARE

## 2024-09-03 NOTE — TELEPHONE ENCOUNTER
----- Message from Chinmay Segura LPN sent at 9/3/2024  8:19 AM CDT -----  Regarding: cesia barrientos 9/11 @9  Are there any outstanding tasks in chart? No    Is there any documentation of tasks? No    Has the pt seen another physician, been to ER, UCC, or admitted to hospital since last visit?    Has the pt done blood work or imaging since last visit?     5. PLEASE HAVE PATIENT BRING MEDICATION LIST OR BOTTLES TO EVERY OFFICE VISIT

## 2024-09-03 NOTE — TELEPHONE ENCOUNTER
Spoke with pt and informed her that she does not need any lab work for upcoming appt, voiced understanding

## 2024-09-03 NOTE — TELEPHONE ENCOUNTER
----- Message from Yisel Cash sent at 9/3/2024  8:50 AM CDT -----  .Who Called: Sumaya Moss        Preferred Method of Contact: Phone Call  Patient's Preferred Phone Number on File: [phone]   Best Call Back Number, if different:  Additional Information: pt calling to see if labs is needed and to add flecainide level as well please call pt once labs in epic

## 2024-09-03 NOTE — TELEPHONE ENCOUNTER
----- Message from [facility] sent at 9/3/2024  9:42 AM CDT -----  .Type:  Patient Returning Call    Who Called: pt    Who Left Message for Patient: Raissa    Does the patient know what this is regarding?: flecainide level    Would the patient rather a call back or a response via [facility]?      Best Call Back Number: [phone]    Additional Information:  pt wants to know if she can add that blood panel please advise thanks

## 2024-09-25 ENCOUNTER — EXTERNAL HOME HEALTH (OUTPATIENT)
Dept: HOME HEALTH SERVICES | Facility: HOSPITAL | Age: 84
End: 2024-09-25
Payer: MEDICARE

## 2024-10-09 ENCOUNTER — TELEPHONE (OUTPATIENT)
Dept: INTERNAL MEDICINE | Facility: CLINIC | Age: 84
End: 2024-10-09
Payer: MEDICARE

## 2024-10-09 NOTE — TELEPHONE ENCOUNTER
----- Message from Nurse Moy sent at 10/9/2024  7:52 AM CDT -----  Regarding: cesia barrientos 10/17 @9:20  Are there any outstanding tasks in chart? No    Is there any documentation of tasks? No    Has the pt seen another physician, been to ER, UCC, or admitted to hospital since last visit?    Has the pt done blood work or imaging since last visit?     5. PLEASE HAVE PATIENT BRING MEDICATION LIST OR BOTTLES TO EVERY OFFICE VISIT

## 2024-10-17 ENCOUNTER — OFFICE VISIT (OUTPATIENT)
Dept: INTERNAL MEDICINE | Facility: CLINIC | Age: 84
End: 2024-10-17
Payer: MEDICARE

## 2024-10-17 VITALS
HEART RATE: 56 BPM | OXYGEN SATURATION: 97 % | SYSTOLIC BLOOD PRESSURE: 130 MMHG | HEIGHT: 65 IN | WEIGHT: 190 LBS | BODY MASS INDEX: 31.65 KG/M2 | RESPIRATION RATE: 18 BRPM | DIASTOLIC BLOOD PRESSURE: 76 MMHG

## 2024-10-17 DIAGNOSIS — I10 PRIMARY HYPERTENSION: ICD-10-CM

## 2024-10-17 DIAGNOSIS — I70.0 AORTIC ATHEROSCLEROSIS: ICD-10-CM

## 2024-10-17 DIAGNOSIS — I48.11 LONGSTANDING PERSISTENT ATRIAL FIBRILLATION: ICD-10-CM

## 2024-10-17 DIAGNOSIS — S42.351A CLOSED DISPLACED COMMINUTED FRACTURE OF SHAFT OF RIGHT HUMERUS, INITIAL ENCOUNTER: ICD-10-CM

## 2024-10-17 DIAGNOSIS — I48.92 ATRIAL FLUTTER, UNSPECIFIED TYPE: ICD-10-CM

## 2024-10-17 DIAGNOSIS — M51.369 DEGENERATION OF INTERVERTEBRAL DISC OF LUMBAR REGION, UNSPECIFIED WHETHER PAIN PRESENT: Primary | ICD-10-CM

## 2024-10-17 PROCEDURE — 99214 OFFICE O/P EST MOD 30 MIN: CPT | Mod: ,,, | Performed by: INTERNAL MEDICINE

## 2024-10-17 RX ORDER — ATORVASTATIN CALCIUM 10 MG/1
10 TABLET, FILM COATED ORAL DAILY
Qty: 90 TABLET | Refills: 3 | Status: SHIPPED | OUTPATIENT
Start: 2024-10-17

## 2024-10-17 NOTE — PROGRESS NOTES
Patient ID: Sumaya Moss is a 84 y.o. female.    Chief Complaint: Follow-up (6 month f/u)      Patient Care Team:  Benedict Jackson II, MD as PCP - General (Internal Medicine)     HPI:   Patient presents here today for above reason.     Ms. Darnell is 84-year-old female presents today in follow-up.  Actually doing quite well history hypertension well-controlled on metoprolol losartan.  AFib on flecainide and anticoagulation she was rate controlled.  No recent falls she does ambulate with a walker her age-appropriate screening is up-to-date cholesterol is at goal on Lipitor.  No blood work was done for today.  Screening is up-to-date    The patient's Health Maintenance was reviewed and the following appears to be due at this time:   Health Maintenance Due   Topic Date Due    Shingles Vaccine (1 of 2) Never done    RSV Vaccine (Age 60+ and Pregnant patients) (1 - 1-dose 75+ series) Never done    Pneumococcal Vaccines (Age 65+) (2 of 2 - PCV) 12/31/2020    COVID-19 Vaccine (5 - 2024-25 season) 09/01/2024        Past Medical History:  Past Medical History:   Diagnosis Date    Coronary artery disease     Fractures     Hypercholesterolemia     Hypertension     Respiratory distress      Past Surgical History:   Procedure Laterality Date    ABLATION      BRAIN SURGERY      aneurysm    COLONOSCOPY      CYSTOSCOPY W/ URETERAL STENT PLACEMENT Left 7/5/2023    Procedure: CYSTOSCOPY, WITH URETERAL STENT INSERTION;  Surgeon: Viet Barkley MD;  Location: [facility];  Service: Urology;  Laterality: Left;  LEFT    HYSTERECTOMY      INTRAMEDULLARY RODDING OF HUMERUS Left 10/26/2022    Procedure: INSERTION, INTRAMEDULLARY LEATHA, HUMERUS;  Surgeon: Doc Scherer DO;  Location: [facility];  Service: Orthopedics;  Laterality: Left;    TRANSFORAMINAL EPIDURAL INJECTION OF STEROID Bilateral 7/1/2024    Procedure: INJECTION, STEROID, EPIDURAL, TRANSFORAMINAL APPROACH Bilateral L4;  Surgeon: Melissa Ventura MD;  Location: [facility] OR;   Service: Pain Management;  Laterality: Bilateral;  Bilateral TFESI L4     Review of patient's allergies indicates:  No Known Allergies  Current Outpatient Medications on File Prior to Visit   Medication Sig Dispense Refill    doxazosin (CARDURA) 4 MG tablet Take 4 mg by mouth once daily.      flecainide (TAMBOCOR) 50 MG Tab Take 50 mg by mouth 2 (two) times a day.      losartan (COZAAR) 50 MG tablet Take 50 mg by mouth 2 (two) times a day.      metoprolol succinate (TOPROL-XL) 100 MG 24 hr tablet 100 mg once daily.      rivaroxaban (XARELTO) 20 mg Tab Take 20 mg by mouth daily with dinner or evening meal.      [DISCONTINUED] atorvastatin (LIPITOR) 10 MG tablet Take 1 tablet (10 mg total) by mouth once daily. 90 tablet 3    acetaminophen (TYLENOL) 650 MG TbSR Take 1 tablet (650 mg total) by mouth every 8 (eight) hours as needed (pain). 30 tablet 0    diclofenac sodium (VOLTAREN ARTHRITIS PAIN) 1 % Gel Apply 4 g topically 4 (four) times daily. 450 g 2    vitamin D (VITAMIN D3) 1000 units Tab Take 1,000 Units by mouth once daily.       No current facility-administered medications on file prior to visit.     Social History     Socioeconomic History    Marital status:    Tobacco Use    Smoking status: Never     Passive exposure: Never    Smokeless tobacco: Never   Substance and Sexual Activity    Alcohol use: Never    Drug use: Never    Sexual activity: Yes   Social History Narrative    ** Merged History Encounter **          Social Drivers of Health     Financial Resource Strain: Low Risk  (4/11/2024)    Overall Financial Resource Strain (CARDIA)     Difficulty of Paying Living Expenses: Not hard at all   Food Insecurity: No Food Insecurity (9/9/2024)    Hunger Vital Sign     Worried About Running Out of Food in the Last Year: Never true     Ran Out of Food in the Last Year: Never true   Transportation Needs: No Transportation Needs (4/11/2024)    PRAPARE - Transportation     Lack of Transportation (Medical): No  "    Lack of Transportation (Non-Medical): No   Physical Activity: Inactive (9/9/2024)    Exercise Vital Sign     Days of Exercise per Week: 0 days     Minutes of Exercise per Session: 0 min   Stress: No Stress Concern Present (9/9/2024)    [employer] - Occupational Stress Questionnaire     Feeling of Stress : Not at all   Housing Stability: Unknown (9/9/2024)    Housing Stability Vital Sign     Unable to Pay for Housing in the Last Year: No     Family History   Problem Relation Name Age of Onset    No Known Problems Mother      No Known Problems Father         ROS:   Review of Systems  Constitutional: No weight gain, No fever, No chills, No fatigue.   Eyes: No blurring, No visual disturbances.   Ear/Nose/Mouth/Throat: No decreased hearing, No ear pain, No nasal congestion, No sore throat.   Respiratory: No shortness of breath, No cough, No wheezing.   Cardiovascular: No chest pain, No palpitations, No peripheral edema.   Gastrointestinal: No nausea, No vomiting, No diarrhea, No constipation, No abdominal pain.   Genitourinary: No dysuria, No hematuria.   Hematology/Lymphatics: No bruising tendency, No bleeding tendency, No swollen lymph glands.   Endocrine: No excessive thirst, No polyuria, No excessive hunger.   Musculoskeletal: No joint pain, No muscle pain, No decreased range of motion.   Integumentary: No rash, No pruritus.   Neurologic: No abnormal balance, No confusion, No headache.   Psychiatric: No anxiety, No depression, Not suicidal, No hallucinations.        A comprehensive HEALTH RISK ASSESSMENT was completed today. Results are summarized below:                  The patient is NOT A TOBACCO USER.  The patient reports NO SIGNIFICANT ALCOHOL USE.     All Questions regarding food, transportation or housing were not answered today.     Vitals/PE:   /76 (BP Location: Left arm, Patient Position: Sitting)   Pulse (!) 56   Resp 18   Ht 5' 5" (1.651 m)   Wt 86.2 kg (190 lb) "   SpO2 97%   BMI 31.62 kg/m²   Physical Exam    General: Alert and oriented, No acute distress.   Eye: Normal conjunctiva without exudate.  HENMT: Normocephalic/AT, Normal hearing, Oral mucosa is moist and pink   Neck: No goiter visualized.   Respiratory: Lungs CTAB, Respirations are non-labored, Breath sounds are equal, Symmetrical chest wall expansion.  Cardiovascular: Normal rate, Regular rhythm, No murmur, No edema.   Gastrointestinal: Non-distended.   Genitourinary: Deferred.  Musculoskeletal: Normal ROM, Normal gait, No deformities or amputations.  Integumentary: Warm, Dry, Intact. No diaphoresis, or flushing.  Neurologic: No focal deficits, Cranial Nerves II-XII are grossly intact.   Psychiatric: Cooperative, Appropriate mood & affect, Normal judgment, Non-suicidal.             No data to display                  10/17/2024     9:20 AM 7/16/2024     9:00 AM 6/19/2024     8:00 AM 5/30/2024     9:00 AM 5/16/2024     1:30 PM 4/22/2024    11:00 AM 4/18/2024     2:00 PM   Fall Risk Assessment - Outpatient   Mobility Status Ambulatory w/ assistance Wheelchair Bound Wheelchair Bound Ambulatory Wheelchair Bound Ambulatory w/ assistance Ambulatory   Number of falls 1 1 2+ 0 1 with injury 0 1 with injury   Identified as fall risk True True True False True True True                Assessment/Plan:       1. Degeneration of intervertebral disc of lumbar region, unspecified whether pain present    2. Atrial flutter, unspecified type    3. Primary hypertension    4. Longstanding persistent atrial fibrillation    5. Closed displaced comminuted fracture of shaft of right humerus, initial encounter         Plan:   One no further falls she did have a proximal humeral fracture after a fall sometime ago but this is now resolved and no further falls she does ambulate with a walker she was anticoagulated but she does continue fall we may have to discontinue anticoagulation.  Otherwise continue current therapy for blood pressure  cholesterol and AFib.  Doing great here for follow-up    Education and counseling done face to face regarding medical conditions and plan. Contact office if new symptoms develop. Should any symptoms ever significantly worsen seek emergency medical attention/go to ER. Follow up at least yearly for wellness or sooner PRN. Nurse to call patient with any results. The patient is receptive, expresses understanding and is agreeable to plan. All questions have been answered.    No follow-ups on file.      Medicare Annual Wellness and Personalized Prevention Plan:   Fall Risk + Home Safety + Hearing Impairment + Depression Screen + Cognitive Impairment Screen + Health Risk Assessment all reviewed.    Advance Care Planning   I attest to discussing Advance Care Planning with patient and/or family member.  Education was provided including the importance of the Health Care Power of , Advance Directives, and/or [place] documentation.  The patient expressed understanding to the importance of this information and discussion.  Length of ACP conversation in minutes:

## 2024-11-15 ENCOUNTER — OFFICE VISIT (OUTPATIENT)
Facility: CLINIC | Age: 84
End: 2024-11-15
Payer: MEDICARE

## 2024-11-15 VITALS
BODY MASS INDEX: 31.67 KG/M2 | DIASTOLIC BLOOD PRESSURE: 69 MMHG | OXYGEN SATURATION: 99 % | WEIGHT: 190.06 LBS | SYSTOLIC BLOOD PRESSURE: 133 MMHG | HEART RATE: 89 BPM | HEIGHT: 65 IN | RESPIRATION RATE: 20 BRPM

## 2024-11-15 DIAGNOSIS — M47.816 LUMBAR FACET ARTHROPATHY: Primary | ICD-10-CM

## 2024-11-15 DIAGNOSIS — M54.16 LUMBAR RADICULOPATHY: ICD-10-CM

## 2024-11-15 DIAGNOSIS — M48.061 LUMBAR FORAMINAL STENOSIS: ICD-10-CM

## 2024-11-15 NOTE — PROGRESS NOTES
[facility]    Subjective:     Chief Complaint: 4 mos f/u lumbar facet arthropathy-jeremiah (Pt states she has been having constant pain in lower back . )    Referred by: No ref. provider found     History of Present Illness: Sumaya Moss is a 84 y.o. female presents today as a four-month follow-up for pain associated with lumbar facet arthropathy.  She is accompanied by her  and presents in a wheelchair.  She wants to discuss options to treat lumbar facet arthropathy.  Her pain remains located to the lumbar axial spine in his worse with standing attempts at chores, walking cooking washing dishes etc..  This becomes exhausting and will elevate her pain score to a 10/10.  Her pain score today is rated as a 10/10 on the NRS.  She currently uses Voltaren gel and Tylenol as needed which does not really do a whole lot to help her pain.  She has also tried gabapentin that helped somewhat however it does not reduce this intense pain.  Review of her MRI lumbar spine shows moderate-to-severe facet arthropathy that is pronounced at L2-L3, L3-L4 and L4-L5.  She does not have a pacemaker, spinal cord stimulator or defibrillator.  She reports her pain feels very achy and deep and it will exhaust her throughout the day.  We discussed more in-depth explanation diagnostic medial branch blocks and radiofrequency ablation for possible longer pain relief lumbar facet arthropathy.  She will think about this and discuss with her  and see if she would like to proceed.  She does not have a pacemaker.  Currently she is using Voltaren gel and Tylenol.  She is not interested in a Toradol shot today even though she did not use Voltaren gel today.  Patient and her  will think about diagnostic medial branch blocks and possible radiofrequency ablation and will call us back if she would like proceed with a bilateral L2-L4 medial branch block.        History of Present Illness during her last office visit with  me on 07/16/2024 included the following:   : Sumaya Moss is a 83 y.o. female presents today as a postoperative follow-up for pain after receiving a bilateral L4 transforaminal epidural steroid injection on 0 7/01/2024 is accompanied by her .  Unfortunately the epidural steroid was ineffective.  She reports her primary pain is located to the lumbar axial spine that is worse with prolonged standing, household chores especially cooking exacerbates her back pain.  These activities will elevate her pain score to a 10/10.  She does not have pain radiating to the legs.  Review of her MRI lumbar spine shows several levels of moderate-to-severe facet arthropathy that is pronounced at L2-L3 L4 and L4-L5.   Additionally she has moderate facet arthropathy at L3-L4.  She does not have a pacemaker, spinal cord stimulator or defibrillator.  She is interested in conservative treatments for arthritis in her lumbar axial spine 1st.  She has not tried Voltaren gel in his interested in trying this 1st.  Patient was originally seen as a new consult by Dr. Ventura on 05/16/2024 per her primary care physician with complaints of low back and neck pain.  (original consult HPI)  This is an 83-year-old female who presents to clinic today for her initial consultation as a referral from her primary care physician. She complains of with neck pain and low back pain that has been present for years. She had a fall shortly before Easter this year, and her back pain has been worse since then. However, she does note that it is starting to gradually improve now. She does still have excruciating back pain if she stands or walks for too long. She was prescribed some gabapentin, which has been helping somewhat. She also started doing home health physical therapy. She takes Tylenol most days for relief. She currently rates the pain as 7/10 on the NRS, which is average. Her pain had gotten up to 10/10 at worst right after she fell. She  "alternates ice packs and a heating pad for relief as well. She has not undergone any previous sugeries.    Pertinent PMH: CAD, cerebral aneurysm w/bleed, atrial flutter  Pertinent PSH, brain surgery no spinal surgeries, pacemaker, defibrillator or spinal cord stimulator             Visit Vitals  /69   Pulse 89   Resp 20   Ht 5' 5" (1.651 m)   Wt 86.2 kg (190 lb 0.6 oz)   SpO2 99%   BMI 31.62 kg/m²      Vitals:    11/15/24 0808   PainSc: 10-Worst pain ever     Pain Disability Index (PDI): 58       Interventional Pain History  07/01/2024: Bilateral L4 TFESI       ROS: Back    CT lumbar spine 2024:   FINDINGS:  There are 5 non-rib-bearing lumbar type vertebral bodies.  There is rightward convexity lumbar scoliosis with apex at L3 and with Nichole angle of 25 degrees utilizing the superior endplate of L2 and inferior endplate of L4.  There is 1-2 mm degenerative retrolisthesis of T12 along with slight left lateral degenerative listhesis of L1 and L2 and right lateral degenerative listhesis of L4.  No pars interarticularis fracture.  The T12 and lumbar vertebral bodies demonstrate no acute or chronic fracture.  There is a chronic Schmorl's node at the superior endplate of L2 with no significant osseous lesion.  There is severe diffuse degenerative disc space narrowing and intervertebral vacuum phenomenon from T11-T12 through L5-S1.  No paravertebral soft tissue abnormality is identified.  There is mild to moderate scattered atherosclerotic calcification.  Additional changes of the spine on a level by level basis and within the limitations of noncontrast CT technique are as follows:     T11-T12: Shallow disc bulge without protrusion.  No spinal canal or neural foramen stenosis.     T12-L1: The slight degenerative retrolisthesis and shallow disc bulging slightly impinges upon the ventral thecal sac with no spinal canal, lateral recess, or neural foramen stenosis.     L1-L2: Mild bilateral degenerative facet arthrosis " and shallow circumferential spondylosis.  No spinal canal, lateral recess, or neural foramen stenosis.     L2-L3: No disc bulge or protrusion.  Moderate to severe left more advanced than right degenerative facet arthrosis and associated hypertrophy.  Mild narrowing of the left lateral recess without spinal canal stenosis.  There is mild left neural foramen stenosis.     L3-L4: Shallow circumferential spondylosis without disc protrusion identified.  Moderate to severe bilateral degenerative facet arthrosis and hypertrophy which is more advanced on the left.  Mild spinal canal narrowing without lateral recess stenosis.  There is mild left neural foramen stenosis.     L4-L5: Shallow circumferential disc bulging and severe bilateral degenerative facet arthrosis hypertrophy, more advanced on the left.  Mild spinal canal narrowing and mild bilateral neural foramen stenosis.     L5-S1: Severe bilateral degenerative facet arthrosis and hypertrophy with no spinal canal or lateral recess stenosis.  Moderate right and no left neural foramen stenosis.  The sacrum and sacroiliac joints are normal.     Impression:     Advanced chronic diffuse lower thoracic and lumbar degenerative disc disease and facet arthrosis with moderate rightward convexity lumbar scoliosis and mild multilevel degenerative spondylolisthesis.  No fracture.  No significant spinal canal stenosis although there is mild narrowing of the left lateral recess at L2-L3.  Neural foramen stenosis is most advanced and moderate on the right at L5-S1.  Additional mild bilateral neural foramen stenosis at L4-L5 and mild left neural foramen stenosis L3-L4 and L2-L3.     No acute pathology identified.        Electronically signed by:Tyler John  Date:                                            04/16/2024       Objective:        Physical Exam  General: Well developed; overweight; A&O x 3; No anxiety/depression; NAD  Mental Status: Oriented to person, palce and time.  Displays appropriate mood & affect.  Head: Norm cephalic and atraumatic  Neck:  No cervical paraspinal banding.  Full range of motion with lateral turning and cervical flexion +extension.  Eyes: normal conjunctiva, normal lids, normal pupils  ENT and mouth: normal external ear, nose, and no lesions noted on the lips.  Respiratory: Symmetrical, Unlabored. No dyspnea  CV: normal rhythm and rate. No peripheral edema.   Abdomen: Non-distended     Extremities:  Gen: No cyanosis or tenderness to palpation bilateral upper and lower extremities  Skin: Warm, pink, dry, no rashes, no lesions on the lumbar spine  Strength: 5/5 motor strength bilateral upper and lower extremities  ROM: Full ROM in bilateral knees and ankles without pain or instability.     Neuro:  Gait: no altalgic lean, normal toe and heel raise. Independent ambulator.  DTR's: 2+ in bilateral patellar, and ankle  Sensory: Intact to light touch bilateral  upper and lower extremities     Spine: Normal lordosis. No scoliosis  L-spine ROM:  Limited and painful ROM to flexion, extension, bilateral rotation,   Straight Leg Raise:  Negative bilaterally  SI Joint: No tenderness to palpation bilaterally.             Assessment:     Sumaya Moss is a 84 y.o. female presents today as a four-month follow-up for pain associated with lumbar facet arthropathy.  She is accompanied by her  and presents in a wheelchair.  She wants to discuss options to treat lumbar facet arthropathy.  Her pain remains located to the lumbar axial spine in his worse with standing attempts at chores, walking cooking washing dishes etc..  This becomes exhausting and will elevate her pain score to a 10/10.  Her pain score today is rated as a 10/10 on the NRS.  She currently uses Voltaren gel and Tylenol as needed which does not really do a whole lot to help her pain.  She has also tried gabapentin that helped somewhat however it does not reduce this intense pain.  Review of her MRI  lumbar spine shows moderate-to-severe facet arthropathy that is pronounced at L2-L3, L3-L4 and L4-L5.  She does not have a pacemaker, spinal cord stimulator or defibrillator.  She reports her pain feels very achy and deep and it will exhaust her throughout the day.  We discussed more in-depth explanation diagnostic medial branch blocks and radiofrequency ablation for possible longer pain relief lumbar facet arthropathy.  She will think about this and discuss with her  and see if she would like to proceed.  She does not have a pacemaker.  Currently she is using Voltaren gel and Tylenol.  She is not interested in a Toradol shot today even though she did not use Voltaren gel today.  Patient and her  will think about diagnostic medial branch blocks and possible radiofrequency ablation and will call us back if she would like proceed with a bilateral L2-L4 medial branch block.    Plan of care:  Patient will call back if she would like to proceed with a diagnostic bilateral L2-L4 medial branch blocks.  Encounter Diagnoses   Name Primary?    Lumbar facet arthropathy Yes    Lumbar foraminal stenosis     Lumbar radiculopathy          Plan:       Sumaya was seen today for 4 mos f/u lumbar facet arthropathy-jeremiah.    Diagnoses and all orders for this visit:    Lumbar facet arthropathy    Lumbar foraminal stenosis    Lumbar radiculopathy           Past Medical History:   Diagnosis Date    Coronary artery disease     Fractures     Hypercholesterolemia     Hypertension     Respiratory distress        Past Surgical History:   Procedure Laterality Date    ABLATION      BRAIN SURGERY      aneurysm    COLONOSCOPY      CYSTOSCOPY W/ URETERAL STENT PLACEMENT Left 7/5/2023    Procedure: CYSTOSCOPY, WITH URETERAL STENT INSERTION;  Surgeon: Viet Barkley MD;  Location: [facility];  Service: Urology;  Laterality: Left;  LEFT    HYSTERECTOMY      INTRAMEDULLARY RODDING OF HUMERUS Left 10/26/2022    Procedure: INSERTION,  INTRAMEDULLARY LEATHA, HUMERUS;  Surgeon: Doc Scherer DO;  Location: [facility] OR;  Service: Orthopedics;  Laterality: Left;    TRANSFORAMINAL EPIDURAL INJECTION OF STEROID Bilateral 7/1/2024    Procedure: INJECTION, STEROID, EPIDURAL, TRANSFORAMINAL APPROACH Bilateral L4;  Surgeon: Melissa Ventura MD;  Location: [facility];  Service: Pain Management;  Laterality: Bilateral;  Bilateral TFESI L4       Family History   Problem Relation Name Age of Onset    No Known Problems Mother      No Known Problems Father         Social History     Socioeconomic History    Marital status:    Tobacco Use    Smoking status: Never     Passive exposure: Never    Smokeless tobacco: Never   Substance and Sexual Activity    Alcohol use: Never    Drug use: Never    Sexual activity: Yes   Social History Narrative    ** Merged History Encounter **          Social Drivers of Health     Financial Resource Strain: Low Risk  (4/11/2024)    Overall Financial Resource Strain (CARDIA)     Difficulty of Paying Living Expenses: Not hard at all   Food Insecurity: No Food Insecurity (9/9/2024)    Hunger Vital Sign     Worried About Running Out of Food in the Last Year: Never true     Ran Out of Food in the Last Year: Never true   Transportation Needs: No Transportation Needs (4/11/2024)    PRAPARE - Transportation     Lack of Transportation (Medical): No     Lack of Transportation (Non-Medical): No   Physical Activity: Inactive (9/9/2024)    Exercise Vital Sign     Days of Exercise per Week: 0 days     Minutes of Exercise per Session: 0 min   Stress: No Stress Concern Present (9/9/2024)    [employer] - Occupational Stress Questionnaire     Feeling of Stress : Not at all   Housing Stability: Unknown (9/9/2024)    Housing Stability Vital Sign     Unable to Pay for Housing in the Last Year: No       Current Outpatient Medications   Medication Sig Dispense Refill    acetaminophen (TYLENOL) 650 MG TbSR Take 1 tablet (650 mg  total) by mouth every 8 (eight) hours as needed (pain). 30 tablet 0    atorvastatin (LIPITOR) 10 MG tablet Take 1 tablet (10 mg total) by mouth once daily. 90 tablet 3    doxazosin (CARDURA) 4 MG tablet Take 4 mg by mouth once daily.      flecainide (TAMBOCOR) 50 MG Tab Take 50 mg by mouth 2 (two) times a day.      losartan (COZAAR) 50 MG tablet Take 50 mg by mouth 2 (two) times a day.      metoprolol succinate (TOPROL-XL) 100 MG 24 hr tablet 100 mg once daily.      rivaroxaban (XARELTO) 20 mg Tab Take 20 mg by mouth daily with dinner or evening meal.      vitamin D (VITAMIN D3) 1000 units Tab Take 1,000 Units by mouth once daily.      diclofenac sodium (VOLTAREN ARTHRITIS PAIN) 1 % Gel Apply 4 g topically 4 (four) times daily. 450 g 2     No current facility-administered medications for this visit.       Review of patient's allergies indicates:  No Known Allergies

## 2025-05-01 ENCOUNTER — TELEPHONE (OUTPATIENT)
Dept: INTERNAL MEDICINE | Facility: CLINIC | Age: 85
End: 2025-05-01
Payer: MEDICARE

## 2025-05-01 DIAGNOSIS — R53.83 FATIGUE, UNSPECIFIED TYPE: ICD-10-CM

## 2025-05-01 DIAGNOSIS — I10 PRIMARY HYPERTENSION: ICD-10-CM

## 2025-05-01 DIAGNOSIS — Z00.00 WELLNESS EXAMINATION: Primary | ICD-10-CM

## 2025-05-01 DIAGNOSIS — I48.11 LONGSTANDING PERSISTENT ATRIAL FIBRILLATION: ICD-10-CM

## 2025-05-01 DIAGNOSIS — I70.0 AORTIC ATHEROSCLEROSIS: ICD-10-CM

## 2025-05-01 NOTE — TELEPHONE ENCOUNTER
----- Message from Nurse Moy sent at 5/1/2025  7:40 AM CDT -----  Regarding: cesia barrientos 5/9 @9  Are there any outstanding tasks in patient chart? Needs fasting labsIs there documentation of outstanding tasks in patient chart? noHas patient been to the ER, urgent care, or another physician since last visit?Has patient done any blood work or x-rays since last visit?5. PLEASE HAVE PATIENT BRING MEDICATION LIST OR BOTTLES TO EVERY OFFICE VISIT

## 2025-05-02 ENCOUNTER — LAB VISIT (OUTPATIENT)
Dept: LAB | Facility: HOSPITAL | Age: 85
End: 2025-05-02
Attending: INTERNAL MEDICINE
Payer: MEDICARE

## 2025-05-02 DIAGNOSIS — I10 PRIMARY HYPERTENSION: ICD-10-CM

## 2025-05-02 DIAGNOSIS — I48.11 LONGSTANDING PERSISTENT ATRIAL FIBRILLATION: ICD-10-CM

## 2025-05-02 DIAGNOSIS — R53.83 FATIGUE, UNSPECIFIED TYPE: ICD-10-CM

## 2025-05-02 DIAGNOSIS — Z00.00 WELLNESS EXAMINATION: ICD-10-CM

## 2025-05-02 DIAGNOSIS — I70.0 AORTIC ATHEROSCLEROSIS: ICD-10-CM

## 2025-05-02 LAB
ALBUMIN SERPL-MCNC: 3.6 G/DL (ref 3.4–4.8)
ALBUMIN/GLOB SERPL: 1.2 RATIO (ref 1.1–2)
ALP SERPL-CCNC: 126 UNIT/L (ref 40–150)
ALT SERPL-CCNC: 11 UNIT/L (ref 0–55)
ANION GAP SERPL CALC-SCNC: 5 MEQ/L
AST SERPL-CCNC: 11 UNIT/L (ref 11–45)
BASOPHILS # BLD AUTO: 0.04 X10(3)/MCL
BASOPHILS NFR BLD AUTO: 0.5 %
BILIRUB SERPL-MCNC: 0.6 MG/DL
BUN SERPL-MCNC: 17.6 MG/DL (ref 9.8–20.1)
CALCIUM SERPL-MCNC: 9 MG/DL (ref 8.4–10.2)
CHLORIDE SERPL-SCNC: 112 MMOL/L (ref 98–107)
CHOLEST SERPL-MCNC: 146 MG/DL
CHOLEST/HDLC SERPL: 3 {RATIO} (ref 0–5)
CO2 SERPL-SCNC: 25 MMOL/L (ref 23–31)
CREAT SERPL-MCNC: 0.77 MG/DL (ref 0.55–1.02)
CREAT/UREA NIT SERPL: 23
EOSINOPHIL # BLD AUTO: 0.19 X10(3)/MCL (ref 0–0.9)
EOSINOPHIL NFR BLD AUTO: 2.5 %
ERYTHROCYTE [DISTWIDTH] IN BLOOD BY AUTOMATED COUNT: 13.2 % (ref 11.5–17)
GFR SERPLBLD CREATININE-BSD FMLA CKD-EPI: >60 ML/MIN/1.73/M2
GLOBULIN SER-MCNC: 3 GM/DL (ref 2.4–3.5)
GLUCOSE SERPL-MCNC: 94 MG/DL (ref 82–115)
HCT VFR BLD AUTO: 42.7 % (ref 37–47)
HDLC SERPL-MCNC: 49 MG/DL (ref 35–60)
HGB BLD-MCNC: 13.6 G/DL (ref 12–16)
IMM GRANULOCYTES # BLD AUTO: 0.06 X10(3)/MCL (ref 0–0.04)
IMM GRANULOCYTES NFR BLD AUTO: 0.8 %
LDLC SERPL CALC-MCNC: 76 MG/DL (ref 50–140)
LYMPHOCYTES # BLD AUTO: 1.86 X10(3)/MCL (ref 0.6–4.6)
LYMPHOCYTES NFR BLD AUTO: 24.3 %
MCH RBC QN AUTO: 31.4 PG (ref 27–31)
MCHC RBC AUTO-ENTMCNC: 31.9 G/DL (ref 33–36)
MCV RBC AUTO: 98.6 FL (ref 80–94)
MONOCYTES # BLD AUTO: 0.97 X10(3)/MCL (ref 0.1–1.3)
MONOCYTES NFR BLD AUTO: 12.6 %
NEUTROPHILS # BLD AUTO: 4.55 X10(3)/MCL (ref 2.1–9.2)
NEUTROPHILS NFR BLD AUTO: 59.3 %
NRBC BLD AUTO-RTO: 0 %
PLATELET # BLD AUTO: 157 X10(3)/MCL (ref 130–400)
PMV BLD AUTO: 9.8 FL (ref 7.4–10.4)
POTASSIUM SERPL-SCNC: 4.3 MMOL/L (ref 3.5–5.1)
PROT SERPL-MCNC: 6.6 GM/DL (ref 5.8–7.6)
RBC # BLD AUTO: 4.33 X10(6)/MCL (ref 4.2–5.4)
SODIUM SERPL-SCNC: 142 MMOL/L (ref 136–145)
TRIGL SERPL-MCNC: 103 MG/DL (ref 37–140)
TSH SERPL-ACNC: 3.42 UIU/ML (ref 0.35–4.94)
VLDLC SERPL CALC-MCNC: 21 MG/DL
WBC # BLD AUTO: 7.67 X10(3)/MCL (ref 4.5–11.5)

## 2025-05-02 PROCEDURE — 80053 COMPREHEN METABOLIC PANEL: CPT

## 2025-05-02 PROCEDURE — 80061 LIPID PANEL: CPT

## 2025-05-02 PROCEDURE — 85025 COMPLETE CBC W/AUTO DIFF WBC: CPT

## 2025-05-02 PROCEDURE — 84443 ASSAY THYROID STIM HORMONE: CPT

## 2025-05-02 PROCEDURE — 36415 COLL VENOUS BLD VENIPUNCTURE: CPT

## 2025-05-09 ENCOUNTER — OFFICE VISIT (OUTPATIENT)
Dept: INTERNAL MEDICINE | Facility: CLINIC | Age: 85
End: 2025-05-09
Payer: MEDICARE

## 2025-05-09 VITALS
OXYGEN SATURATION: 96 % | SYSTOLIC BLOOD PRESSURE: 128 MMHG | HEIGHT: 65 IN | WEIGHT: 195 LBS | HEART RATE: 57 BPM | RESPIRATION RATE: 16 BRPM | BODY MASS INDEX: 32.49 KG/M2 | DIASTOLIC BLOOD PRESSURE: 80 MMHG

## 2025-05-09 DIAGNOSIS — I60.9 INTRACRANIAL SUBARACHNOID HEMORRHAGE: Primary | ICD-10-CM

## 2025-05-09 DIAGNOSIS — M51.369 DEGENERATION OF INTERVERTEBRAL DISC OF LUMBAR REGION, UNSPECIFIED WHETHER PAIN PRESENT: ICD-10-CM

## 2025-05-09 DIAGNOSIS — I48.11 LONGSTANDING PERSISTENT ATRIAL FIBRILLATION: ICD-10-CM

## 2025-05-09 DIAGNOSIS — I10 PRIMARY HYPERTENSION: ICD-10-CM

## 2025-05-09 DIAGNOSIS — I48.92 ATRIAL FLUTTER, UNSPECIFIED TYPE: ICD-10-CM

## 2025-05-09 DIAGNOSIS — Z00.00 ANNUAL PHYSICAL EXAM: ICD-10-CM

## 2025-05-09 NOTE — PROGRESS NOTES
Patient ID: Sumaya Moss is a 84 y.o. female.    Chief Complaint: Medicare AWV (Labs done 5/2, has some swelling of BLE for about 2-3 months)      Patient Care Team:  Benedict Jackson II, MD as PCP - General (Internal Medicine)     HPI:   Patient presents here today for above reason.     Ms. Darnell is a 84-year-old female presents today for annual visit have blood work done here for review history AFib anticoagulated rate controlled and also on flecainide.  Blood pressure is well-controlled on current therapy cholesterol is at goal.  Will be seeing her cardiologist next week.  Does have 1+ pitting edema of the lower extremities she states her echocardiogram are within normal limits.  Otherwise age-appropriate screening up-to-date here for follow-up.    The patient's Health Maintenance was reviewed and the following appears to be due at this time:   Health Maintenance Due   Topic Date Due    Shingles Vaccine (1 of 2) Never done    RSV Vaccine (Age 60+ and Pregnant patients) (1 - 1-dose 75+ series) Never done    Pneumococcal Vaccines (Age 50+) (2 of 2 - PCV) 12/31/2020        Past Medical History:  Past Medical History:   Diagnosis Date    Coronary artery disease     Fractures     Hypercholesterolemia     Hypertension     Respiratory distress      Past Surgical History:   Procedure Laterality Date    ABLATION      BRAIN SURGERY      aneurysm    COLONOSCOPY      CYSTOSCOPY W/ URETERAL STENT PLACEMENT Left 7/5/2023    Procedure: CYSTOSCOPY, WITH URETERAL STENT INSERTION;  Surgeon: Viet Barkley MD;  Location: [facility];  Service: Urology;  Laterality: Left;  LEFT    HYSTERECTOMY      INTRAMEDULLARY RODDING OF HUMERUS Left 10/26/2022    Procedure: INSERTION, INTRAMEDULLARY LEATHA, HUMERUS;  Surgeon: Doc Scherer DO;  Location: [facility];  Service: Orthopedics;  Laterality: Left;    TRANSFORAMINAL EPIDURAL INJECTION OF STEROID Bilateral 7/1/2024    Procedure: INJECTION, STEROID, EPIDURAL, TRANSFORAMINAL APPROACH  Bilateral L4;  Surgeon: Melissa Ventura MD;  [facility];  Service: Pain Management;  Laterality: Bilateral;  Bilateral TFESI L4     Review of patient's allergies indicates:  No Known Allergies  Medications Ordered Prior to Encounter[1]  Social History[2]  Family History   Problem Relation Name Age of Onset    No Known Problems Mother      No Known Problems Father         ROS:   Review of Systems  Constitutional: No weight gain, No fever, No chills, No fatigue.   Eyes: No blurring, No visual disturbances.   Ear/Nose/Mouth/Throat: No decreased hearing, No ear pain, No nasal congestion, No sore throat.   Respiratory: No shortness of breath, No cough, No wheezing.   Cardiovascular: No chest pain, No palpitations, No peripheral edema.   Gastrointestinal: No nausea, No vomiting, No diarrhea, No constipation, No abdominal pain.   Genitourinary: No dysuria, No hematuria.   Hematology/Lymphatics: No bruising tendency, No bleeding tendency, No swollen lymph glands.   Endocrine: No excessive thirst, No polyuria, No excessive hunger.   Musculoskeletal: No joint pain, No muscle pain, No decreased range of motion.   Integumentary: No rash, No pruritus.   Neurologic: No abnormal balance, No confusion, No headache.   Psychiatric: No anxiety, No depression, Not suicidal, No hallucinations.        A comprehensive HEALTH RISK ASSESSMENT was completed today. Results are summarized below:    There are NO EMOTIONAL/SOCIAL CONCERNS identified on today's screening for Social Isolation, Depression and Anxiety.    There are NO COGNITIVE FUNCTION CONCERNS identified on today's screening.  The following FUNCTIONAL AND/OR SAFETY CONCERNS were identified on today's screening for Physical Symptoms, Nutritional, Home Safety/Living Situation, Fall Risk, Activities of Daily Living, Independent Activities of Daily Living, Physical Activity,Timed Up and Go test and Whisper test::   *Patient reports she NEEDS AMBULATION ASSISTANCE. (Do you use  "an assistance device to easily get around?: (!) Yes)(Ambulation Assistance: Walker (wheeled))     The patient reports NO OPIOID PRESCRIPTIONS. This was confirmed through medication reconciliation.    The patient is NOT A TOBACCO USER.  The patient reports NO SIGNIFICANT ALCOHOL USE.     All Questions regarding food, transportation or housing were not answered today.     Vitals/PE:   /80 (BP Location: Left arm, Patient Position: Sitting)   Pulse (!) 57   Resp 16   Ht 5' 5" (1.651 m)   Wt 88.5 kg (195 lb)   SpO2 96%   BMI 32.45 kg/m²   Physical Exam    General: Alert and oriented, No acute distress.   Eye: Normal conjunctiva without exudate.  HENMT: Normocephalic/AT, Normal hearing, Oral mucosa is moist and pink   Neck: No goiter visualized.   Respiratory: Lungs CTAB, Respirations are non-labored, Breath sounds are equal, Symmetrical chest wall expansion.  Cardiovascular: Normal rate, Regular rhythm, No murmur, No edema.   Gastrointestinal: Non-distended.   Genitourinary: Deferred.  Musculoskeletal: Normal ROM, Normal gait, No deformities or amputations.  Integumentary: Warm, Dry, Intact. No diaphoresis, or flushing.  Neurologic: No focal deficits, Cranial Nerves II-XII are grossly intact.   Psychiatric: Cooperative, Appropriate mood & affect, Normal judgment, Non-suicidal.             No data to display                  5/9/2025     9:00 AM 11/15/2024     8:00 AM 10/17/2024     9:20 AM 7/16/2024     9:00 AM 6/19/2024     8:00 AM 5/30/2024     9:00 AM 5/16/2024     1:30 PM   Fall Risk Assessment - Outpatient   Mobility Status Ambulatory w/ assistance Wheelchair Bound Ambulatory w/ assistance Wheelchair Bound Wheelchair Bound Ambulatory Wheelchair Bound   Number of falls 0  1 1 2+ 0 1 with injury   Identified as fall risk True  True True True False True                Assessment/Plan:       1. Intracranial subarachnoid hemorrhage    2. Degeneration of intervertebral disc of lumbar region, unspecified " whether pain present    3. Primary hypertension    4. Atrial flutter, unspecified type    5. Longstanding persistent atrial fibrillation    6. Annual physical exam         Plan:  Labs wnl  Screening uptodate  Rtc 1 year  ICH resolved  Ddd lumbar spine, s/p VARUN  HTN controlled with current rx.   Afib, rate controlled, on oac  Screening up to date.   Rtc 6 mo      Education and counseling done face to face regarding medical conditions and plan. Contact office if new symptoms develop. Should any symptoms ever significantly worsen seek emergency medical attention/go to ER. Follow up at least yearly for wellness or sooner PRN. Nurse to call patient with any results. The patient is receptive, expresses understanding and is agreeable to plan. All questions have been answered.    No follow-ups on file.      Medicare Annual Wellness and Personalized Prevention Plan:   Fall Risk + Home Safety + Hearing Impairment + Depression Screen + Cognitive Impairment Screen + Health Risk Assessment all reviewed.    Advance Care Planning   I attest to discussing Advance Care Planning with patient and/or family member.  Education was provided including the importance of the Health Care Power of , Advance Directives, and/or [place] documentation.  The patient expressed understanding to the importance of this information and discussion.  Length of ACP conversation in minutes:                   [1]   Current Outpatient Medications on File Prior to Visit   Medication Sig Dispense Refill    acetaminophen (TYLENOL) 650 MG TbSR Take 1 tablet (650 mg total) by mouth every 8 (eight) hours as needed (pain). 30 tablet 0    atorvastatin (LIPITOR) 10 MG tablet Take 1 tablet (10 mg total) by mouth once daily. 90 tablet 3    doxazosin (CARDURA) 4 MG tablet Take 4 mg by mouth once daily.      flecainide (TAMBOCOR) 50 MG Tab Take 50 mg by mouth 2 (two) times a day.      losartan (COZAAR) 50 MG tablet Take 50 mg by mouth 2 (two) times a day.       metoprolol succinate (TOPROL-XL) 100 MG 24 hr tablet 100 mg once daily.      rivaroxaban (XARELTO) 20 mg Tab Take 20 mg by mouth daily with dinner or evening meal.      vitamin D (VITAMIN D3) 1000 units Tab Take 1,000 Units by mouth once daily.      diclofenac sodium (VOLTAREN ARTHRITIS PAIN) 1 % Gel Apply 4 g topically 4 (four) times daily. 450 g 2     No current facility-administered medications on file prior to visit.   [2]   Social History  Socioeconomic History    Marital status:    Tobacco Use    Smoking status: Never     Passive exposure: Never    Smokeless tobacco: Never   Substance and Sexual Activity    Alcohol use: Never    Drug use: Never    Sexual activity: Yes   Social History Narrative    ** Merged History Encounter **          Social Drivers of Health     Financial Resource Strain: Low Risk  (4/11/2024)    Overall Financial Resource Strain (CARDIA)     Difficulty of Paying Living Expenses: Not hard at all   Food Insecurity: No Food Insecurity (9/9/2024)    Hunger Vital Sign     Worried About Running Out of Food in the Last Year: Never true     Ran Out of Food in the Last Year: Never true   Transportation Needs: No Transportation Needs (4/11/2024)    PRAPARE - Transportation     Lack of Transportation (Medical): No     Lack of Transportation (Non-Medical): No   Physical Activity: Inactive (9/9/2024)    Exercise Vital Sign     Days of Exercise per Week: 0 days     Minutes of Exercise per Session: 0 min   Stress: No Stress Concern Present (9/9/2024)    [employer] - Occupational Stress Questionnaire     Feeling of Stress : Not at all   Housing Stability: Unknown (9/9/2024)    Housing Stability Vital Sign     Unable to Pay for Housing in the Last Year: No

## (undated) DEVICE — SOL IRRIGATION WATER 3000ML

## (undated) DEVICE — Device

## (undated) DEVICE — DRSNG TELFA ADH ISLAND 4X4IN

## (undated) DEVICE — SUT CTD VICRYL CT-1 27

## (undated) DEVICE — CATH POLLACK OPEN-END FLEXI-TI

## (undated) DEVICE — GLOVE SIGNATURE MICRO LTX 6.5

## (undated) DEVICE — GLOVE PROTEXIS HYDROGEL SZ9

## (undated) DEVICE — SUT ABSRB VCRL 12 STRAND 18 IN

## (undated) DEVICE — SUT VICRYL BR 1 GEN 27 CT-1

## (undated) DEVICE — MARKER WRITESITE SKIN CHLRAPRP

## (undated) DEVICE — BLADE SURG CARBON STEEL #10

## (undated) DEVICE — TRAY CATH FOL SIL URIMTR 16FR

## (undated) DEVICE — GLOVE PROTEXIS HYDROGEL SZ6

## (undated) DEVICE — SUPPORT ULNA NERVE PROTECTOR

## (undated) DEVICE — DRAPE MEDIUM SHEET 40X70IN

## (undated) DEVICE — STAPLER SKIN PROXIMATE WIDE

## (undated) DEVICE — ELECTRODE REM POLYHESIVE II

## (undated) DEVICE — GLOVE PROTEXIS HYDROGEL SZ8.5

## (undated) DEVICE — SUT MAXBRAID #2 W/CRV TAPR NDL

## (undated) DEVICE — TAPE SILK 3IN

## (undated) DEVICE — SUT ETHILON 2-0 FSLX 30 BLK

## (undated) DEVICE — DRESSING XEROFORM 5X9IN

## (undated) DEVICE — COVER TABLE HVY DTY 60X90IN

## (undated) DEVICE — GOWN SMARTSLEEVE AAMI LVL4 XXL

## (undated) DEVICE — NDL FLTR 5MCRN BLNT TIP 18GX1

## (undated) DEVICE — SOL IRRI STRL WATER 1000ML

## (undated) DEVICE — NDL QUINCKE S/SU 22GA 5IN

## (undated) DEVICE — KIT SURGIFLO HEMOSTATIC MATRIX

## (undated) DEVICE — ADAPTER STOPCOCK FEMALE LL

## (undated) DEVICE — ADAPTER DUAL NSL LUER M-M 7FT

## (undated) DEVICE — BOWL STERILE LARGE 32OZ

## (undated) DEVICE — SENSOR DUAL FLEX STR 150CM

## (undated) DEVICE — BNDG COFLEX FOAM LF2 ST 6X5YD

## (undated) DEVICE — SOL NACL IRR 3000ML

## (undated) DEVICE — GLOVE PROTEXIS BLUE LATEX 9

## (undated) DEVICE — FORCEP RTRVL GATOR FLX 3F 65CM

## (undated) DEVICE — GLOVE PROTEXIS NEU-THERA SZ6

## (undated) DEVICE — DRAPE SURGICAL STERI IRRG PCH

## (undated) DEVICE — POSITIONER HEAD ADULT

## (undated) DEVICE — APPLICATOR CHLORAPREP ORN 26ML

## (undated) DEVICE — NDL HYPO REG 25G X 1 1/2

## (undated) DEVICE — TRAY SKIN SCRUB WET PREMIUM

## (undated) DEVICE — SOCKINETTE IMPERVIOS 6X48

## (undated) DEVICE — DRESSING TELFA ISLAND 2X3.75IN

## (undated) DEVICE — SET SMARTSITE EXT SMALLBORE NF

## (undated) DEVICE — IMPLANTABLE DEVICE
Type: IMPLANTABLE DEVICE | Site: HUMERUS | Status: NON-FUNCTIONAL
Removed: 2022-10-26

## (undated) DEVICE — SUT ETHILON 2-0 FS 18IN BLK

## (undated) DEVICE — BAG DRAIN UROLOGY AND HOSE

## (undated) DEVICE — NDL SYR 10ML 18X1.5 LL BLUNT

## (undated) DEVICE — COVER FULLGUARD SHOE HIGH-TOP

## (undated) DEVICE — DRAPE ORTH SPLIT 77X108IN

## (undated) DEVICE — GOWN SMARTGOWN 3XL XLONG

## (undated) DEVICE — STOCKINETTE IMPERV INTRM 9X44

## (undated) DEVICE — NITINOL WIRE WITH HYDROPHILIC TIP
Type: IMPLANTABLE DEVICE | Site: URETER | Status: NON-FUNCTIONAL
Brand: SENSOR
Removed: 2023-07-05

## (undated) DEVICE — DRAPE C-ARMOR EQUIPMENT COVER

## (undated) DEVICE — DRESSING XEROFORM FOIL PK 1X8

## (undated) DEVICE — DRAPE STERI U-SHAPED 47X51IN

## (undated) DEVICE — CONTRAST ISOVUE M 200 20ML VIL

## (undated) DEVICE — KIT SURGICAL TURNOVER

## (undated) DEVICE — CHLORAPREP 10.5 ML APPLICATOR

## (undated) DEVICE — SYR 3ML LL 18GA 1.5IN

## (undated) DEVICE — PAD PREP 50/CA

## (undated) DEVICE — DRAPE UTILITY W/ TAPE 20X30IN